# Patient Record
Sex: MALE | Race: WHITE | NOT HISPANIC OR LATINO | URBAN - METROPOLITAN AREA
[De-identification: names, ages, dates, MRNs, and addresses within clinical notes are randomized per-mention and may not be internally consistent; named-entity substitution may affect disease eponyms.]

---

## 2017-01-04 ENCOUNTER — OFFICE (AMBULATORY)
Dept: URBAN - METROPOLITAN AREA CLINIC 64 | Facility: CLINIC | Age: 52
End: 2017-01-04

## 2017-01-04 VITALS
SYSTOLIC BLOOD PRESSURE: 126 MMHG | WEIGHT: 153 LBS | HEIGHT: 68 IN | HEART RATE: 88 BPM | DIASTOLIC BLOOD PRESSURE: 68 MMHG

## 2017-01-04 DIAGNOSIS — K22.70 BARRETT'S ESOPHAGUS WITHOUT DYSPLASIA: ICD-10-CM

## 2017-01-04 DIAGNOSIS — K62.6 ULCER OF ANUS AND RECTUM: ICD-10-CM

## 2017-01-04 PROCEDURE — 99212 OFFICE O/P EST SF 10 MIN: CPT | Performed by: INTERNAL MEDICINE

## 2017-09-14 ENCOUNTER — HOSPITAL ENCOUNTER (OUTPATIENT)
Dept: LAB | Facility: HOSPITAL | Age: 52
Discharge: HOME | End: 2017-09-14
Attending: PSYCHIATRY & NEUROLOGY | Admitting: PSYCHIATRY & NEUROLOGY

## 2017-10-25 ENCOUNTER — HOSPITAL ENCOUNTER (OUTPATIENT)
Dept: URGENT CARE | Facility: CLINIC | Age: 52
Discharge: HOME | End: 2017-10-25
Attending: NURSE PRACTITIONER | Admitting: NURSE PRACTITIONER

## 2017-12-11 ENCOUNTER — HOSPITAL ENCOUNTER (OUTPATIENT)
Dept: PHYSICAL THERAPY | Facility: HOSPITAL | Age: 52
Setting detail: RECURRING SERIES
Discharge: HOME | End: 2018-02-28
Attending: ORTHOPAEDIC SURGERY | Admitting: ORTHOPAEDIC SURGERY

## 2017-12-21 ENCOUNTER — HOSPITAL ENCOUNTER (OUTPATIENT)
Dept: ULTRASOUND IMAGING | Facility: HOSPITAL | Age: 52
Discharge: HOME | End: 2017-12-21
Attending: NURSE PRACTITIONER | Admitting: NURSE PRACTITIONER

## 2018-02-28 ENCOUNTER — OFFICE (AMBULATORY)
Dept: URBAN - METROPOLITAN AREA CLINIC 64 | Facility: CLINIC | Age: 53
End: 2018-02-28

## 2018-02-28 VITALS
HEIGHT: 68 IN | HEART RATE: 74 BPM | DIASTOLIC BLOOD PRESSURE: 69 MMHG | SYSTOLIC BLOOD PRESSURE: 110 MMHG | WEIGHT: 143 LBS

## 2018-02-28 DIAGNOSIS — K22.70 BARRETT'S ESOPHAGUS WITHOUT DYSPLASIA: ICD-10-CM

## 2018-02-28 DIAGNOSIS — K62.6 ULCER OF ANUS AND RECTUM: ICD-10-CM

## 2018-02-28 DIAGNOSIS — K21.9 GASTRO-ESOPHAGEAL REFLUX DISEASE WITHOUT ESOPHAGITIS: ICD-10-CM

## 2018-02-28 PROCEDURE — 99212 OFFICE O/P EST SF 10 MIN: CPT | Performed by: INTERNAL MEDICINE

## 2018-03-19 ENCOUNTER — HOSPITAL ENCOUNTER (OUTPATIENT)
Dept: PREADMISSION TESTING | Facility: HOSPITAL | Age: 53
Discharge: HOME | End: 2018-03-19
Attending: SURGERY | Admitting: SURGERY

## 2018-03-29 ENCOUNTER — HOSPITAL ENCOUNTER (OUTPATIENT)
Dept: OTHER | Facility: HOSPITAL | Age: 53
Setting detail: SPECIMEN
Discharge: HOME | End: 2018-03-29
Attending: SURGERY | Admitting: SURGERY

## 2018-05-21 ENCOUNTER — HOSPITAL ENCOUNTER (OUTPATIENT)
Dept: LAB | Facility: HOSPITAL | Age: 53
Discharge: HOME | End: 2018-05-21
Attending: PSYCHIATRY & NEUROLOGY | Admitting: PSYCHIATRY & NEUROLOGY

## 2018-05-21 LAB
BASOPHILS # BLD AUTO: 0 10*3/UL (ref 0–0.2)
BASOPHILS NFR BLD AUTO: 0 % (ref 0–2)
CHOLEST SERPL-MCNC: 125 MG/DL
CHOLEST/HDLC SERPL: 3 {RATIO}
CONV LDL CHOLESTEROL DIRECT: 72 MG/DL (ref 0–100)
DIFFERENTIAL METHOD BLD: (no result)
EOSINOPHIL # BLD AUTO: 0.1 10*3/UL (ref 0–0.3)
EOSINOPHIL # BLD AUTO: 1 % (ref 0–3)
ERYTHROCYTE [DISTWIDTH] IN BLOOD BY AUTOMATED COUNT: 14.1 % (ref 11.5–14.5)
HCT VFR BLD AUTO: 37.8 % (ref 40–54)
HDLC SERPL-MCNC: 42 MG/DL
HGB BLD-MCNC: 12.5 G/DL (ref 14–18)
LDLC/HDLC SERPL: 1.7 {RATIO}
LIPID INTERPRETATION: NORMAL
LYMPHOCYTES # BLD AUTO: 1.6 10*3/UL (ref 0.8–4.8)
LYMPHOCYTES NFR BLD AUTO: 24 % (ref 18–42)
MCH RBC QN AUTO: 31.7 PG (ref 26–32)
MCHC RBC AUTO-ENTMCNC: 32.9 G/DL (ref 32–36)
MCV RBC AUTO: 96.2 FL (ref 80–94)
MONOCYTES # BLD AUTO: 0.5 10*3/UL (ref 0.1–1.3)
MONOCYTES NFR BLD AUTO: 7 % (ref 2–11)
NEUTROPHILS # BLD AUTO: 4.5 10*3/UL (ref 2.3–8.6)
NEUTROPHILS NFR BLD AUTO: 68 % (ref 50–75)
NRBC BLD AUTO-RTO: 0 /100{WBCS}
NRBC/RBC NFR BLD MANUAL: 0 10*3/UL
PLATELET # BLD AUTO: 198 10*3/UL (ref 150–450)
PMV BLD AUTO: 9 FL (ref 7.4–10.4)
RBC # BLD AUTO: 3.93 10*6/UL (ref 4.6–6)
TRIGL SERPL-MCNC: 32 MG/DL
VLDLC SERPL CALC-MCNC: 10.5 MG/DL
WBC # BLD AUTO: 6.7 10*3/UL (ref 4.5–11.5)

## 2018-10-05 ENCOUNTER — HOSPITAL ENCOUNTER (OUTPATIENT)
Dept: INFUSION THERAPY | Facility: HOSPITAL | Age: 53
Setting detail: RECURRING SERIES
Discharge: HOME | End: 2018-10-18
Attending: UROLOGY | Admitting: UROLOGY

## 2019-03-08 ENCOUNTER — OFFICE (AMBULATORY)
Dept: URBAN - METROPOLITAN AREA CLINIC 64 | Facility: CLINIC | Age: 54
End: 2019-03-08

## 2019-03-08 VITALS
HEART RATE: 78 BPM | WEIGHT: 151 LBS | SYSTOLIC BLOOD PRESSURE: 116 MMHG | HEIGHT: 68 IN | DIASTOLIC BLOOD PRESSURE: 74 MMHG

## 2019-03-08 DIAGNOSIS — K22.70 BARRETT'S ESOPHAGUS WITHOUT DYSPLASIA: ICD-10-CM

## 2019-03-08 DIAGNOSIS — K21.9 GASTRO-ESOPHAGEAL REFLUX DISEASE WITHOUT ESOPHAGITIS: ICD-10-CM

## 2019-03-08 DIAGNOSIS — K62.6 ULCER OF ANUS AND RECTUM: ICD-10-CM

## 2019-03-08 PROCEDURE — 99213 OFFICE O/P EST LOW 20 MIN: CPT | Performed by: INTERNAL MEDICINE

## 2019-04-11 ENCOUNTER — HOSPITAL ENCOUNTER (OUTPATIENT)
Dept: OTHER | Facility: HOSPITAL | Age: 54
Setting detail: SPECIMEN
Discharge: HOME | End: 2019-04-11
Attending: INTERNAL MEDICINE | Admitting: INTERNAL MEDICINE

## 2019-04-11 ENCOUNTER — ON CAMPUS - OUTPATIENT (AMBULATORY)
Dept: URBAN - METROPOLITAN AREA HOSPITAL 2 | Facility: HOSPITAL | Age: 54
End: 2019-04-11

## 2019-04-11 ENCOUNTER — OFFICE (AMBULATORY)
Dept: URBAN - METROPOLITAN AREA PATHOLOGY 4 | Facility: PATHOLOGY | Age: 54
End: 2019-04-11

## 2019-04-11 VITALS
DIASTOLIC BLOOD PRESSURE: 99 MMHG | RESPIRATION RATE: 18 BRPM | HEART RATE: 79 BPM | OXYGEN SATURATION: 93 % | SYSTOLIC BLOOD PRESSURE: 97 MMHG | SYSTOLIC BLOOD PRESSURE: 156 MMHG | OXYGEN SATURATION: 97 % | HEART RATE: 82 BPM | SYSTOLIC BLOOD PRESSURE: 114 MMHG | RESPIRATION RATE: 16 BRPM | HEART RATE: 88 BPM | DIASTOLIC BLOOD PRESSURE: 60 MMHG | HEART RATE: 91 BPM | HEART RATE: 95 BPM | TEMPERATURE: 97 F | OXYGEN SATURATION: 98 % | WEIGHT: 154 LBS | SYSTOLIC BLOOD PRESSURE: 123 MMHG | RESPIRATION RATE: 17 BRPM | OXYGEN SATURATION: 95 % | OXYGEN SATURATION: 96 % | HEIGHT: 68 IN | HEART RATE: 90 BPM | DIASTOLIC BLOOD PRESSURE: 54 MMHG | DIASTOLIC BLOOD PRESSURE: 64 MMHG | SYSTOLIC BLOOD PRESSURE: 139 MMHG | DIASTOLIC BLOOD PRESSURE: 88 MMHG | RESPIRATION RATE: 14 BRPM

## 2019-04-11 DIAGNOSIS — K22.70 BARRETT'S ESOPHAGUS WITHOUT DYSPLASIA: ICD-10-CM

## 2019-04-11 LAB
GI HISTOLOGY: A. SELECT: (no result)
GI HISTOLOGY: B. SELECT: (no result)
GI HISTOLOGY: PDF REPORT: (no result)

## 2019-04-11 PROCEDURE — 43239 EGD BIOPSY SINGLE/MULTIPLE: CPT | Performed by: INTERNAL MEDICINE

## 2019-04-11 PROCEDURE — 88305 TISSUE EXAM BY PATHOLOGIST: CPT | Mod: 26 | Performed by: INTERNAL MEDICINE

## 2020-07-08 ENCOUNTER — OFFICE (AMBULATORY)
Dept: URBAN - METROPOLITAN AREA CLINIC 64 | Facility: CLINIC | Age: 55
End: 2020-07-08

## 2020-07-08 VITALS
SYSTOLIC BLOOD PRESSURE: 86 MMHG | HEART RATE: 109 BPM | WEIGHT: 149 LBS | HEIGHT: 68 IN | DIASTOLIC BLOOD PRESSURE: 43 MMHG

## 2020-07-08 DIAGNOSIS — D50.9 IRON DEFICIENCY ANEMIA, UNSPECIFIED: ICD-10-CM

## 2020-07-08 DIAGNOSIS — K22.70 BARRETT'S ESOPHAGUS WITHOUT DYSPLASIA: ICD-10-CM

## 2020-07-08 DIAGNOSIS — K62.6 ULCER OF ANUS AND RECTUM: ICD-10-CM

## 2020-07-08 PROCEDURE — 99214 OFFICE O/P EST MOD 30 MIN: CPT | Performed by: INTERNAL MEDICINE

## 2020-10-19 ENCOUNTER — TRANSCRIBE ORDERS (OUTPATIENT)
Dept: ADMINISTRATIVE | Facility: HOSPITAL | Age: 55
End: 2020-10-19

## 2020-10-19 DIAGNOSIS — N32.81 OVERACTIVE BLADDER: ICD-10-CM

## 2020-10-19 DIAGNOSIS — N40.0 ENLARGED PROSTATE: Primary | ICD-10-CM

## 2020-10-28 ENCOUNTER — HOSPITAL ENCOUNTER (OUTPATIENT)
Dept: ULTRASOUND IMAGING | Facility: HOSPITAL | Age: 55
Discharge: HOME | End: 2020-10-28
Admitting: NURSE PRACTITIONER

## 2020-10-28 DIAGNOSIS — N32.81 OVERACTIVE BLADDER: ICD-10-CM

## 2020-10-28 DIAGNOSIS — N40.0 ENLARGED PROSTATE: ICD-10-CM

## 2020-10-28 PROCEDURE — 76775 US EXAM ABDO BACK WALL LIM: CPT

## 2021-04-14 ENCOUNTER — TRANSCRIBE ORDERS (OUTPATIENT)
Dept: ULTRASOUND IMAGING | Facility: HOSPITAL | Age: 56
End: 2021-04-14

## 2021-04-14 DIAGNOSIS — N32.3 DIVERTICULUM OF BLADDER: Primary | ICD-10-CM

## 2021-05-04 ENCOUNTER — HOSPITAL ENCOUNTER (OUTPATIENT)
Dept: ULTRASOUND IMAGING | Facility: HOSPITAL | Age: 56
Discharge: HOME | End: 2021-05-04
Admitting: UROLOGY

## 2021-05-04 DIAGNOSIS — N32.3 DIVERTICULUM OF BLADDER: ICD-10-CM

## 2021-05-04 PROCEDURE — 76775 US EXAM ABDO BACK WALL LIM: CPT

## 2021-06-10 ENCOUNTER — LAB (OUTPATIENT)
Dept: LAB | Facility: HOSPITAL | Age: 56
End: 2021-06-10

## 2021-06-10 ENCOUNTER — TRANSCRIBE ORDERS (OUTPATIENT)
Dept: ADMINISTRATIVE | Facility: HOSPITAL | Age: 56
End: 2021-06-10

## 2021-06-10 DIAGNOSIS — N13.8 ENLARGED PROSTATE WITH URINARY OBSTRUCTION: ICD-10-CM

## 2021-06-10 DIAGNOSIS — R35.0 URINARY FREQUENCY: Primary | ICD-10-CM

## 2021-06-10 DIAGNOSIS — N40.1 ENLARGED PROSTATE WITH URINARY OBSTRUCTION: ICD-10-CM

## 2021-06-10 DIAGNOSIS — R35.0 URINARY FREQUENCY: ICD-10-CM

## 2021-06-10 LAB — PSA SERPL-MCNC: 0.05 NG/ML (ref 0–4)

## 2021-06-10 PROCEDURE — 84153 ASSAY OF PSA TOTAL: CPT

## 2021-06-10 PROCEDURE — G0103 PSA SCREENING: HCPCS

## 2021-06-10 PROCEDURE — 36415 COLL VENOUS BLD VENIPUNCTURE: CPT

## 2021-06-15 ENCOUNTER — TRANSCRIBE ORDERS (OUTPATIENT)
Dept: ADMINISTRATIVE | Facility: HOSPITAL | Age: 56
End: 2021-06-15

## 2021-06-15 DIAGNOSIS — R35.0 URINARY FREQUENCY: Primary | ICD-10-CM

## 2021-06-15 DIAGNOSIS — N28.1 RENAL CYST: ICD-10-CM

## 2021-06-30 ENCOUNTER — HOSPITAL ENCOUNTER (OUTPATIENT)
Dept: MRI IMAGING | Facility: HOSPITAL | Age: 56
Discharge: HOME | End: 2021-06-30

## 2021-06-30 ENCOUNTER — HOSPITAL ENCOUNTER (OUTPATIENT)
Dept: GENERAL RADIOLOGY | Facility: HOSPITAL | Age: 56
Discharge: HOME | End: 2021-06-30

## 2021-06-30 DIAGNOSIS — N28.1 RENAL CYST: ICD-10-CM

## 2021-06-30 DIAGNOSIS — M79.5 FOREIGN BODY (FB) IN SOFT TISSUE: ICD-10-CM

## 2021-06-30 DIAGNOSIS — R35.0 URINARY FREQUENCY: ICD-10-CM

## 2021-06-30 LAB — CREAT BLDA-MCNC: 1 MG/DL (ref 0.6–1.3)

## 2021-06-30 PROCEDURE — A9579 GAD-BASE MR CONTRAST NOS,1ML: HCPCS | Performed by: NURSE PRACTITIONER

## 2021-06-30 PROCEDURE — 25010000002 GADOTERIDOL PER 1 ML: Performed by: NURSE PRACTITIONER

## 2021-06-30 PROCEDURE — 82565 ASSAY OF CREATININE: CPT

## 2021-06-30 PROCEDURE — 70250 X-RAY EXAM OF SKULL: CPT

## 2021-06-30 PROCEDURE — 74183 MRI ABD W/O CNTR FLWD CNTR: CPT

## 2021-06-30 PROCEDURE — 74018 RADEX ABDOMEN 1 VIEW: CPT

## 2021-06-30 PROCEDURE — 71045 X-RAY EXAM CHEST 1 VIEW: CPT

## 2021-06-30 RX ADMIN — GADOTERIDOL 20 ML: 279.3 INJECTION, SOLUTION INTRAVENOUS at 12:59

## 2021-07-22 ENCOUNTER — OFFICE (AMBULATORY)
Dept: URBAN - METROPOLITAN AREA CLINIC 64 | Facility: CLINIC | Age: 56
End: 2021-07-22

## 2021-07-22 VITALS
DIASTOLIC BLOOD PRESSURE: 74 MMHG | HEIGHT: 68 IN | SYSTOLIC BLOOD PRESSURE: 134 MMHG | HEART RATE: 85 BPM | WEIGHT: 153 LBS

## 2021-07-22 DIAGNOSIS — K62.6 ULCER OF ANUS AND RECTUM: ICD-10-CM

## 2021-07-22 DIAGNOSIS — K22.70 BARRETT'S ESOPHAGUS WITHOUT DYSPLASIA: ICD-10-CM

## 2021-07-22 PROCEDURE — 99214 OFFICE O/P EST MOD 30 MIN: CPT | Performed by: INTERNAL MEDICINE

## 2022-01-18 ENCOUNTER — LAB (OUTPATIENT)
Dept: LAB | Facility: HOSPITAL | Age: 57
End: 2022-01-18

## 2022-01-18 ENCOUNTER — TRANSCRIBE ORDERS (OUTPATIENT)
Dept: ADMINISTRATIVE | Facility: HOSPITAL | Age: 57
End: 2022-01-18

## 2022-01-18 DIAGNOSIS — Z11.52 ENCOUNTER FOR SCREENING FOR SEVERE ACUTE RESPIRATORY SYNDROME CORONAVIRUS 2 (SARS-COV-2) INFECTION: ICD-10-CM

## 2022-01-18 DIAGNOSIS — Z11.52 ENCOUNTER FOR SCREENING FOR SEVERE ACUTE RESPIRATORY SYNDROME CORONAVIRUS 2 (SARS-COV-2) INFECTION: Primary | ICD-10-CM

## 2022-01-18 LAB — SARS-COV-2 ORF1AB RESP QL NAA+PROBE: DETECTED

## 2022-01-18 PROCEDURE — U0004 COV-19 TEST NON-CDC HGH THRU: HCPCS

## 2022-01-18 PROCEDURE — U0005 INFEC AGEN DETEC AMPLI PROBE: HCPCS

## 2022-01-18 PROCEDURE — C9803 HOPD COVID-19 SPEC COLLECT: HCPCS

## 2022-04-20 ENCOUNTER — OFFICE (AMBULATORY)
Dept: URBAN - METROPOLITAN AREA PATHOLOGY 4 | Facility: PATHOLOGY | Age: 57
End: 2022-04-20

## 2022-04-20 ENCOUNTER — ON CAMPUS - OUTPATIENT (AMBULATORY)
Dept: URBAN - METROPOLITAN AREA HOSPITAL 2 | Facility: HOSPITAL | Age: 57
End: 2022-04-20

## 2022-04-20 ENCOUNTER — OFFICE (AMBULATORY)
Dept: URBAN - METROPOLITAN AREA PATHOLOGY 4 | Facility: PATHOLOGY | Age: 57
End: 2022-04-20
Payer: COMMERCIAL

## 2022-04-20 VITALS
TEMPERATURE: 97.9 F | HEART RATE: 89 BPM | DIASTOLIC BLOOD PRESSURE: 38 MMHG | DIASTOLIC BLOOD PRESSURE: 76 MMHG | SYSTOLIC BLOOD PRESSURE: 111 MMHG | HEART RATE: 76 BPM | DIASTOLIC BLOOD PRESSURE: 49 MMHG | HEART RATE: 65 BPM | RESPIRATION RATE: 17 BRPM | WEIGHT: 151 LBS | DIASTOLIC BLOOD PRESSURE: 52 MMHG | DIASTOLIC BLOOD PRESSURE: 69 MMHG | SYSTOLIC BLOOD PRESSURE: 119 MMHG | OXYGEN SATURATION: 97 % | DIASTOLIC BLOOD PRESSURE: 58 MMHG | RESPIRATION RATE: 18 BRPM | OXYGEN SATURATION: 96 % | OXYGEN SATURATION: 98 % | HEART RATE: 75 BPM | OXYGEN SATURATION: 94 % | HEART RATE: 81 BPM | HEIGHT: 68 IN | HEART RATE: 80 BPM | SYSTOLIC BLOOD PRESSURE: 112 MMHG | RESPIRATION RATE: 19 BRPM | SYSTOLIC BLOOD PRESSURE: 97 MMHG | OXYGEN SATURATION: 99 % | HEART RATE: 68 BPM | SYSTOLIC BLOOD PRESSURE: 116 MMHG | SYSTOLIC BLOOD PRESSURE: 126 MMHG | SYSTOLIC BLOOD PRESSURE: 93 MMHG | DIASTOLIC BLOOD PRESSURE: 53 MMHG | HEART RATE: 66 BPM | OXYGEN SATURATION: 95 % | DIASTOLIC BLOOD PRESSURE: 48 MMHG | SYSTOLIC BLOOD PRESSURE: 87 MMHG | SYSTOLIC BLOOD PRESSURE: 79 MMHG | HEART RATE: 71 BPM | RESPIRATION RATE: 16 BRPM

## 2022-04-20 DIAGNOSIS — K64.1 SECOND DEGREE HEMORRHOIDS: ICD-10-CM

## 2022-04-20 DIAGNOSIS — K57.30 DIVERTICULOSIS OF LARGE INTESTINE WITHOUT PERFORATION OR ABS: ICD-10-CM

## 2022-04-20 DIAGNOSIS — K22.70 BARRETT'S ESOPHAGUS WITHOUT DYSPLASIA: ICD-10-CM

## 2022-04-20 DIAGNOSIS — K62.89 OTHER SPECIFIED DISEASES OF ANUS AND RECTUM: ICD-10-CM

## 2022-04-20 DIAGNOSIS — K63.3 ULCER OF INTESTINE: ICD-10-CM

## 2022-04-20 DIAGNOSIS — R19.4 CHANGE IN BOWEL HABIT: ICD-10-CM

## 2022-04-20 DIAGNOSIS — K44.9 DIAPHRAGMATIC HERNIA WITHOUT OBSTRUCTION OR GANGRENE: ICD-10-CM

## 2022-04-20 LAB
GI HISTOLOGY: A. SELECT: (no result)
GI HISTOLOGY: B. UNSPECIFIED: (no result)
GI HISTOLOGY: PDF REPORT: (no result)

## 2022-04-20 PROCEDURE — 88305 TISSUE EXAM BY PATHOLOGIST: CPT | Mod: 26 | Performed by: INTERNAL MEDICINE

## 2022-04-20 PROCEDURE — 45380 COLONOSCOPY AND BIOPSY: CPT | Performed by: INTERNAL MEDICINE

## 2022-04-20 PROCEDURE — 43239 EGD BIOPSY SINGLE/MULTIPLE: CPT | Performed by: INTERNAL MEDICINE

## 2022-04-20 RX ORDER — MESALAMINE 1000 MG/1
1 SUPPOSITORY RECTAL
Qty: 30 | Refills: 6 | Status: ACTIVE
Start: 2022-04-20

## 2022-06-03 ENCOUNTER — HOSPITAL ENCOUNTER (EMERGENCY)
Facility: HOSPITAL | Age: 57
Discharge: HOME | End: 2022-06-03
Attending: EMERGENCY MEDICINE | Admitting: EMERGENCY MEDICINE

## 2022-06-03 ENCOUNTER — APPOINTMENT (OUTPATIENT)
Dept: GENERAL RADIOLOGY | Facility: HOSPITAL | Age: 57
End: 2022-06-03

## 2022-06-03 VITALS
RESPIRATION RATE: 16 BRPM | WEIGHT: 170 LBS | OXYGEN SATURATION: 94 % | SYSTOLIC BLOOD PRESSURE: 140 MMHG | DIASTOLIC BLOOD PRESSURE: 75 MMHG | TEMPERATURE: 97.9 F | BODY MASS INDEX: 27.32 KG/M2 | HEIGHT: 66 IN | HEART RATE: 96 BPM

## 2022-06-03 DIAGNOSIS — S82.032A CLOSED DISPLACED TRANSVERSE FRACTURE OF LEFT PATELLA, INITIAL ENCOUNTER: Primary | ICD-10-CM
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
## 2022-06-03 PROCEDURE — 99284 EMERGENCY DEPT VISIT MOD MDM: CPT

## 2022-06-03 PROCEDURE — 73562 X-RAY EXAM OF KNEE 3: CPT

## 2022-06-03 PROCEDURE — 99283 EMERGENCY DEPT VISIT LOW MDM: CPT

## 2022-06-03 RX ORDER — HYDROCODONE BITARTRATE AND ACETAMINOPHEN 5; 325 MG/1; MG/1
1 TABLET ORAL ONCE AS NEEDED
Status: COMPLETED | OUTPATIENT
Start: 2022-06-03 | End: 2022-06-03

## 2022-06-03 RX ORDER — ONDANSETRON 4 MG/1
4 TABLET, ORALLY DISINTEGRATING ORAL EVERY 8 HOURS PRN
Qty: 10 TABLET | Refills: 0 | Status: SHIPPED | OUTPATIENT
Start: 2022-06-03 | End: 2022-08-12

## 2022-06-03 RX ORDER — HYDROCODONE BITARTRATE AND ACETAMINOPHEN 5; 325 MG/1; MG/1
1 TABLET ORAL EVERY 6 HOURS PRN
Qty: 12 TABLET | Refills: 0 | Status: ON HOLD | OUTPATIENT
Start: 2022-06-03 | End: 2022-08-23 | Stop reason: SDUPTHER

## 2022-06-03 RX ADMIN — HYDROCODONE BITARTRATE AND ACETAMINOPHEN 1 TABLET: 5; 325 TABLET ORAL at 16:07

## 2022-06-03 NOTE — DISCHARGE INSTRUCTIONS
Use knee immobilizer and walker, nonweightbearing  Call Dr. Figueroa, orthopedist first thing the morning  Prescriptions for Norco and Zofran have been sent to preferred pharmacy  Take Norco as prescribed  Can also use ibuprofen, can take Tylenol but be aware of how much Tylenol you are taking throughout the day with Norco  Ice to the area of discomfort 20 minutes at a time 3-4 times a day    Return to the ER for new or worsening symptoms

## 2022-06-03 NOTE — ED PROVIDER NOTES
Subjective   Chief Complaint: Fall      HPI: Patient is a 57-year-old male presents to the ER today by EMS following a fall.  Patient has a history of mental delay and has a caretaker at bedside who states that patient was attempting to step up on a curb and tripped striking his left knee.  He was nonambulatory following the fall as caretaker told him to stay on the ground due to left knee swelling and pain.  Patient has no numbness and tingling to lower extremity, pain with movement and palpation.    Caretaker at bedside reports the patient has a mental delay    PCP: Mally          Review of Systems   Constitutional: Negative.    HENT: Negative.    Respiratory: Negative.    Cardiovascular: Negative.    Gastrointestinal: Negative.    Genitourinary: Negative.    Musculoskeletal: Positive for arthralgias and joint swelling.   Neurological: Negative.    Hematological: Negative.    Psychiatric/Behavioral: Negative.        Past Medical History:   Diagnosis Date   • Anemia    • Anxiety    • Cataract     bilateral   • Colitis    • Depression    • Disease of thyroid gland    • Diverticulitis    • Hemorrhoids    • History of BPH    • Hydrocele in adult    • Hyperopia    • Hypertension    • Impulse control disorder    • Seasonal allergies    • Shingles        No Known Allergies    Past Surgical History:   Procedure Laterality Date   • BACK SURGERY      nodule removed   • ORCHIECTOMY Left    • ORIF HIP FRACTURE Right        History reviewed. No pertinent family history.    Social History     Socioeconomic History   • Marital status: Single   Tobacco Use   • Smoking status: Never Smoker   Vaping Use   • Vaping Use: Never used   Substance and Sexual Activity   • Alcohol use: Never   • Drug use: Never   • Sexual activity: Defer           Objective   Physical Exam  Vitals reviewed.   Constitutional:       Appearance: He is not toxic-appearing.   HENT:      Head: Normocephalic.      Right Ear: Tympanic membrane normal.      Left  "Ear: Tympanic membrane normal.      Nose: Nose normal.      Mouth/Throat:      Mouth: Mucous membranes are moist.      Pharynx: Oropharynx is clear.   Eyes:      Extraocular Movements: Extraocular movements intact.      Pupils: Pupils are equal, round, and reactive to light.   Cardiovascular:      Rate and Rhythm: Normal rate.      Pulses: Normal pulses.      Heart sounds: Normal heart sounds. No murmur heard.  Pulmonary:      Effort: Pulmonary effort is normal.      Breath sounds: Normal breath sounds. No wheezing.   Abdominal:      Palpations: Abdomen is soft.   Musculoskeletal:         General: Tenderness present.      Cervical back: Normal range of motion. No tenderness.      Right knee: Normal.      Left knee: Swelling and deformity present. Decreased range of motion. Tenderness present.   Skin:     General: Skin is warm and dry.      Capillary Refill: Capillary refill takes less than 2 seconds.      Findings: No bruising.   Neurological:      Mental Status: He is alert. Mental status is at baseline.         Procedures           ED Course  ED Course as of 06/03/22 1728   Fri Jun 03, 2022   1553 Inspect completed patient has had 2 prescribers with a total of 25 prescriptions with the most recent 5/12/2022 for clonazepam for 28-day supply [BH]   1615 Patient was unable to ambulate using assistive devices, walker or crutches.  Patient will be admitted or further evaluation and plan of care. []   1716 Spoke with Radha head nurse at patient's waiver home who states that they will have appropriate staffing and will be able to care for the patient if he is discharged. []   1716   Discharge []      ED Course User Index  [] Lindsay Felix APRN           /75 (BP Location: Left arm, Patient Position: Sitting)   Pulse 96   Temp 97.9 °F (36.6 °C) (Oral)   Resp 16   Ht 167.6 cm (66\")   Wt 77.1 kg (170 lb)   SpO2 94%   BMI 27.44 kg/m²   Labs Reviewed - No data to display  Medications "   HYDROcodone-acetaminophen (NORCO) 5-325 MG per tablet 1 tablet (1 tablet Oral Given 6/3/22 3434)     XR Knee 3 View Left    Result Date: 6/3/2022  1.Transverse fracture involving the mid pole of the patella. There is distraction of the proximal distal fracture fragments by approximately 2.5 cm. 2.There is also lateral displacement of the patellar fracture fragments suggesting potential medial ligament disruption. 3.There is marked overlying anterior soft tissue prominence suggesting edema and hemorrhage.  Electronically Signed By-Jesu Arias MD On:6/3/2022 3:40 PM This report was finalized on [id number] by  Jesu Arias MD.                                          MDM  Number of Diagnoses or Management Options  Closed displaced transverse fracture of left patella, initial encounter  Fall, initial encounter  Diagnosis management comments: While in the emergency room above evaluation was completed and imaging was obtained, noted transverse patellar fracture.  Call was placed to Dr. Figueroa with orthopedist who advised placement of a knee immobilizer and patient to utilize crutches or walker.  Patient does have a mental delay and a walker was utilized.  There was difficulty educating the patient on the use of the walker and there was concern that patient may need admission for continued observation and placement.  I spoke with Radha who is the primary nurse who cares for the patient as he currently lives in a waiver home.  She states that they have a wheelchair and patient will be provided with a walker, she states that she has appropriate staff and is able to care for this patient as he will need.  Caretaker at the bedside was updated on plan for discharge.  Patient was at baseline, he was given a dose of Norco in the emergency room and pain and antiemetics were sent to his preferred pharmacy.  There were no further questions at time of discharge.     Chart review: No recent visits that are pertinent to  today's complaints    Comorbidities:  Depression, impulse control disorder, history of BPH, colitis, hyperopia, disease of thyroid gland, shingles, anxiety, anemia, cataracts, diverticulitis, Hydrozone ago, hypertension, seasonal allergies      Differentials: Contusion, fracture not all inclusive of differentials considered      Pt is aware that discharge does not mean that nothing is wrong but it indicates no emergency is present and they must continue care with follow-up as given below or physician of their choice    Appropriate PPE worn throughout the care of this patient.           Amount and/or Complexity of Data Reviewed  Tests in the radiology section of CPT®: reviewed    Patient Progress  Patient progress: improved      Final diagnoses:   Closed displaced transverse fracture of left patella, initial encounter   Fall, initial encounter       ED Disposition  ED Disposition     ED Disposition   Discharge    Condition   Stable    Comment   --             Maki Bal, APRN  [address] [facility] [place] [address] [phone]    Schedule an appointment as soon as possible for a visit in 1 week  As needed    Juan R Figueroa II, MD  [address] [place] [address] [place] [address] [phone]    Call in 1 day           Medication List      New Prescriptions    HYDROcodone-acetaminophen 5-325 MG per tablet  Commonly known as: NORCO  Take 1 tablet by mouth Every 6 (Six) Hours As Needed for Moderate Pain .     ondansetron ODT 4 MG disintegrating tablet  Commonly known as: ZOFRAN-ODT  Place 1 tablet on the tongue Every 8 (Eight) Hours As Needed for Nausea or Vomiting.           Where to Get Your Medications      These medications were sent to [facility] [address] [place] [address]    Hours: Mon-Fri 7:00AM-7:00PM Phone: [phone]   · HYDROcodone-acetaminophen 5-325 MG per tablet  · ondansetron ODT 4 MG disintegrating tablet          Lindsay Felix,  APRN  06/03/22 1729

## 2022-06-08 ENCOUNTER — LAB (OUTPATIENT)
Dept: LAB | Facility: HOSPITAL | Age: 57
End: 2022-06-08

## 2022-06-08 ENCOUNTER — HOSPITAL ENCOUNTER (OUTPATIENT)
Dept: CARDIOLOGY | Facility: HOSPITAL | Age: 57
Discharge: HOME | End: 2022-06-08

## 2022-06-08 ENCOUNTER — TRANSCRIBE ORDERS (OUTPATIENT)
Dept: ADMINISTRATIVE | Facility: HOSPITAL | Age: 57
End: 2022-06-08

## 2022-06-08 DIAGNOSIS — Z01.89 SPECIAL NEEDS ASSESSMENT: ICD-10-CM

## 2022-06-08 DIAGNOSIS — Z01.818 PREOP EXAMINATION: ICD-10-CM

## 2022-06-08 DIAGNOSIS — Z01.89 SPECIAL NEEDS ASSESSMENT: Primary | ICD-10-CM

## 2022-06-08 PROCEDURE — 93010 ELECTROCARDIOGRAM REPORT: CPT | Performed by: INTERNAL MEDICINE

## 2022-06-08 PROCEDURE — 85025 COMPLETE CBC W/AUTO DIFF WBC: CPT

## 2022-06-08 PROCEDURE — 36415 COLL VENOUS BLD VENIPUNCTURE: CPT

## 2022-06-08 PROCEDURE — 80053 COMPREHEN METABOLIC PANEL: CPT

## 2022-06-08 PROCEDURE — 93005 ELECTROCARDIOGRAM TRACING: CPT | Performed by: ORTHOPAEDIC SURGERY

## 2022-06-09 LAB
ALBUMIN SERPL-MCNC: 3.8 G/DL (ref 3.5–5.2)
ALBUMIN/GLOB SERPL: 1.2 G/DL
ALP SERPL-CCNC: 80 U/L (ref 39–117)
ALT SERPL W P-5'-P-CCNC: 23 U/L (ref 1–41)
ANION GAP SERPL CALCULATED.3IONS-SCNC: 13.5 MMOL/L (ref 5–15)
AST SERPL-CCNC: 30 U/L (ref 1–40)
BASOPHILS # BLD AUTO: 0.03 10*3/MM3 (ref 0–0.2)
BASOPHILS NFR BLD AUTO: 0.4 % (ref 0–1.5)
BILIRUB SERPL-MCNC: 0.3 MG/DL (ref 0–1.2)
BUN SERPL-MCNC: 22 MG/DL (ref 6–20)
BUN/CREAT SERPL: 27.5 (ref 7–25)
CALCIUM SPEC-SCNC: 9 MG/DL (ref 8.6–10.5)
CHLORIDE SERPL-SCNC: 101 MMOL/L (ref 98–107)
CO2 SERPL-SCNC: 26.5 MMOL/L (ref 22–29)
CREAT SERPL-MCNC: 0.8 MG/DL (ref 0.76–1.27)
DEPRECATED RDW RBC AUTO: 43.1 FL (ref 37–54)
EGFRCR SERPLBLD CKD-EPI 2021: 103.2 ML/MIN/1.73
EOSINOPHIL # BLD AUTO: 0.09 10*3/MM3 (ref 0–0.4)
EOSINOPHIL NFR BLD AUTO: 1.2 % (ref 0.3–6.2)
ERYTHROCYTE [DISTWIDTH] IN BLOOD BY AUTOMATED COUNT: 12.1 % (ref 12.3–15.4)
GLOBULIN UR ELPH-MCNC: 3.1 GM/DL
GLUCOSE SERPL-MCNC: 108 MG/DL (ref 65–99)
HCT VFR BLD AUTO: 34.6 % (ref 37.5–51)
HGB BLD-MCNC: 11.3 G/DL (ref 13–17.7)
IMM GRANULOCYTES # BLD AUTO: 0.02 10*3/MM3 (ref 0–0.05)
IMM GRANULOCYTES NFR BLD AUTO: 0.3 % (ref 0–0.5)
LYMPHOCYTES # BLD AUTO: 1.19 10*3/MM3 (ref 0.7–3.1)
LYMPHOCYTES NFR BLD AUTO: 16 % (ref 19.6–45.3)
MCH RBC QN AUTO: 32 PG (ref 26.6–33)
MCHC RBC AUTO-ENTMCNC: 32.7 G/DL (ref 31.5–35.7)
MCV RBC AUTO: 98 FL (ref 79–97)
MONOCYTES # BLD AUTO: 0.79 10*3/MM3 (ref 0.1–0.9)
MONOCYTES NFR BLD AUTO: 10.6 % (ref 5–12)
NEUTROPHILS NFR BLD AUTO: 5.31 10*3/MM3 (ref 1.7–7)
NEUTROPHILS NFR BLD AUTO: 71.5 % (ref 42.7–76)
NRBC BLD AUTO-RTO: 0 /100 WBC (ref 0–0.2)
PLATELET # BLD AUTO: 216 10*3/MM3 (ref 140–450)
PMV BLD AUTO: 10.7 FL (ref 6–12)
POTASSIUM SERPL-SCNC: 3.7 MMOL/L (ref 3.5–5.2)
PROT SERPL-MCNC: 6.9 G/DL (ref 6–8.5)
RBC # BLD AUTO: 3.53 10*6/MM3 (ref 4.14–5.8)
SODIUM SERPL-SCNC: 141 MMOL/L (ref 136–145)
WBC NRBC COR # BLD: 7.43 10*3/MM3 (ref 3.4–10.8)

## 2022-06-13 ENCOUNTER — HOME CARE VISIT (OUTPATIENT)
Dept: HOME HEALTH SERVICES | Facility: HOME HEALTHCARE | Age: 57
End: 2022-06-13

## 2022-06-13 ENCOUNTER — HOME HEALTH ADMISSION (OUTPATIENT)
Dept: HOME HEALTH SERVICES | Facility: HOME HEALTHCARE | Age: 57
End: 2022-06-13

## 2022-06-13 ENCOUNTER — TRANSCRIBE ORDERS (OUTPATIENT)
Dept: HOME HEALTH SERVICES | Facility: HOME HEALTHCARE | Age: 57
End: 2022-06-13

## 2022-06-13 DIAGNOSIS — S82.002E TYPE I OR II OPEN FRACTURE OF LEFT PATELLA WITH ROUTINE HEALING, UNSPECIFIED FRACTURE MORPHOLOGY, SUBSEQUENT ENCOUNTER: Primary | ICD-10-CM

## 2022-06-13 LAB — QT INTERVAL: 375 MS

## 2022-06-13 PROCEDURE — G0151 HHCP-SERV OF PT,EA 15 MIN: HCPCS

## 2022-06-14 VITALS
WEIGHT: 165 LBS | RESPIRATION RATE: 18 BRPM | TEMPERATURE: 98.6 F | HEIGHT: 67 IN | OXYGEN SATURATION: 96 % | SYSTOLIC BLOOD PRESSURE: 124 MMHG | DIASTOLIC BLOOD PRESSURE: 72 MMHG | HEART RATE: 88 BPM | BODY MASS INDEX: 25.9 KG/M2

## 2022-06-14 NOTE — HOME HEALTH
"Mr. Lucien Morrison is 57-year old patient with history of mental retardation and lives in a group home apt with 24/7 paid caregiver assistance. He was referred to this Agency with closed displaced transverse fracture of lelft patella following a fall incident. Patient is currently in knee brace with NWB order on lt le x 6wks and spends mostly in bed. He was independent in indor/outdoor ambulation and participated in Day Program.    Patient is weak and demonstrates 3+/5 and 3-/5 gross strength in rt and le respectively. Lt knee is currently in knee immobilizer with NWB order on le x 6wks and therefore shows significant functional deficits. Given his mental status, patient currently requires max assist x 1 with bed mobility and needing max assist of 2 paid caregivers for transfer from bed to/from wheelchair. He is very guarded and defensive this visit but later warmed up to this PT stating \"I want to get well\". Patient will benefit from further skilled PT services for therapeutic/home exercise program to ble, caregiver education for safe assistance in bed mobility and ability to perform bed to/from wheelchair transfer without compromising NWB order on lt le.    PMH:  Depression, impulse control disorder, history of BPH, colitis, hyperopia, disease of thyroid gland, shingles, anxiety, anemia, cataracts, diverticulitis, Hydrozone ago, hypertension, seasonal allergies"

## 2022-06-16 ENCOUNTER — HOME CARE VISIT (OUTPATIENT)
Dept: HOME HEALTH SERVICES | Facility: HOME HEALTHCARE | Age: 57
End: 2022-06-16

## 2022-06-16 PROCEDURE — G0151 HHCP-SERV OF PT,EA 15 MIN: HCPCS

## 2022-06-18 ENCOUNTER — HOME CARE VISIT (OUTPATIENT)
Dept: HOME HEALTH SERVICES | Facility: HOME HEALTHCARE | Age: 57
End: 2022-06-18

## 2022-06-18 VITALS
HEART RATE: 86 BPM | SYSTOLIC BLOOD PRESSURE: 122 MMHG | RESPIRATION RATE: 18 BRPM | TEMPERATURE: 98.3 F | OXYGEN SATURATION: 91 % | DIASTOLIC BLOOD PRESSURE: 80 MMHG

## 2022-06-18 NOTE — HOME HEALTH
Patient was much more bright and very cooperative. The following paid caregivers were present this visit: the manager for several of the Apts that accomodate the residents, Radha, the nurse and the paid caregiver assisting patient in his apt this date. They all verbalized good understanding of wheelchair placement and stated good understanding of how this PT transferred patient in compliance with lt le NWB.    The manager requested PT visit scheduled for next Tues to be at 4pm for transfer inservice to all paid caregivers during shift change and this PT agreed.

## 2022-06-20 ENCOUNTER — HOME CARE VISIT (OUTPATIENT)
Dept: HOME HEALTH SERVICES | Facility: HOME HEALTHCARE | Age: 57
End: 2022-06-20

## 2022-07-05 ENCOUNTER — HOME CARE VISIT (OUTPATIENT)
Dept: HOME HEALTH SERVICES | Facility: HOME HEALTHCARE | Age: 57
End: 2022-07-05

## 2022-07-30 ENCOUNTER — HOSPITAL ENCOUNTER (EMERGENCY)
Facility: HOSPITAL | Age: 57
Discharge: NURSING, REHAB OR LONG-TERM CARE | End: 2022-07-31
Attending: EMERGENCY MEDICINE | Admitting: EMERGENCY MEDICINE

## 2022-07-30 VITALS
TEMPERATURE: 97.7 F | SYSTOLIC BLOOD PRESSURE: 128 MMHG | BODY MASS INDEX: 23.54 KG/M2 | OXYGEN SATURATION: 95 % | HEART RATE: 108 BPM | RESPIRATION RATE: 18 BRPM | DIASTOLIC BLOOD PRESSURE: 63 MMHG | WEIGHT: 150 LBS | HEIGHT: 67 IN

## 2022-07-30 DIAGNOSIS — L89.896 PRESSURE INJURY OF DEEP TISSUE OF LEFT KNEE: ICD-10-CM

## 2022-07-30 DIAGNOSIS — S82.032A CLOSED DISPLACED TRANSVERSE FRACTURE OF LEFT PATELLA, INITIAL ENCOUNTER: Primary | ICD-10-CM

## 2022-07-30 PROCEDURE — 99283 EMERGENCY DEPT VISIT LOW MDM: CPT

## 2022-07-31 ENCOUNTER — APPOINTMENT (OUTPATIENT)
Dept: GENERAL RADIOLOGY | Facility: HOSPITAL | Age: 57
End: 2022-07-31

## 2022-07-31 LAB — SARS-COV-2 RNA RESP QL NAA+PROBE: NOT DETECTED

## 2022-07-31 PROCEDURE — U0005 INFEC AGEN DETEC AMPLI PROBE: HCPCS | Performed by: EMERGENCY MEDICINE

## 2022-07-31 PROCEDURE — U0003 INFECTIOUS AGENT DETECTION BY NUCLEIC ACID (DNA OR RNA); SEVERE ACUTE RESPIRATORY SYNDROME CORONAVIRUS 2 (SARS-COV-2) (CORONAVIRUS DISEASE [COVID-19]), AMPLIFIED PROBE TECHNIQUE, MAKING USE OF HIGH THROUGHPUT TECHNOLOGIES AS DESCRIBED BY CMS-2020-01-R: HCPCS | Performed by: EMERGENCY MEDICINE

## 2022-07-31 PROCEDURE — 73560 X-RAY EXAM OF KNEE 1 OR 2: CPT

## 2022-08-03 ENCOUNTER — HOME CARE VISIT (OUTPATIENT)
Dept: HOME HEALTH SERVICES | Facility: HOME HEALTHCARE | Age: 57
End: 2022-08-03

## 2022-08-12 ENCOUNTER — APPOINTMENT (OUTPATIENT)
Dept: GENERAL RADIOLOGY | Facility: HOSPITAL | Age: 57
End: 2022-08-12

## 2022-08-12 ENCOUNTER — HOSPITAL ENCOUNTER (INPATIENT)
Facility: HOSPITAL | Age: 57
LOS: 11 days | Discharge: NURSING, REHAB OR LONG-TERM CARE | End: 2022-08-23
Attending: EMERGENCY MEDICINE | Admitting: INTERNAL MEDICINE

## 2022-08-12 DIAGNOSIS — S82.032A CLOSED DISPLACED TRANSVERSE FRACTURE OF LEFT PATELLA, INITIAL ENCOUNTER: Primary | ICD-10-CM

## 2022-08-12 LAB
ABO GROUP BLD: NORMAL
ALBUMIN SERPL-MCNC: 3.2 G/DL (ref 3.5–5.2)
ALBUMIN/GLOB SERPL: 0.9 G/DL
ALP SERPL-CCNC: 65 U/L (ref 39–117)
ALT SERPL W P-5'-P-CCNC: 13 U/L (ref 1–41)
ANION GAP SERPL CALCULATED.3IONS-SCNC: 7.6 MMOL/L (ref 5–15)
AST SERPL-CCNC: 15 U/L (ref 1–40)
BASOPHILS # BLD AUTO: 0.04 10*3/MM3 (ref 0–0.2)
BASOPHILS NFR BLD AUTO: 0.7 % (ref 0–1.5)
BILIRUB SERPL-MCNC: <0.2 MG/DL (ref 0–1.2)
BLD GP AB SCN SERPL QL: NEGATIVE
BUN SERPL-MCNC: 15 MG/DL (ref 6–20)
BUN/CREAT SERPL: 20 (ref 7–25)
CALCIUM SPEC-SCNC: 8.6 MG/DL (ref 8.6–10.5)
CHLORIDE SERPL-SCNC: 100 MMOL/L (ref 98–107)
CO2 SERPL-SCNC: 31.4 MMOL/L (ref 22–29)
CREAT SERPL-MCNC: 0.75 MG/DL (ref 0.76–1.27)
DEPRECATED RDW RBC AUTO: 42.3 FL (ref 37–54)
EGFRCR SERPLBLD CKD-EPI 2021: 105.3 ML/MIN/1.73
EOSINOPHIL # BLD AUTO: 0.33 10*3/MM3 (ref 0–0.4)
EOSINOPHIL NFR BLD AUTO: 5.5 % (ref 0.3–6.2)
ERYTHROCYTE [DISTWIDTH] IN BLOOD BY AUTOMATED COUNT: 12.5 % (ref 12.3–15.4)
GLOBULIN UR ELPH-MCNC: 3.5 GM/DL
GLUCOSE SERPL-MCNC: 112 MG/DL (ref 65–99)
HCT VFR BLD AUTO: 30.1 % (ref 37.5–51)
HGB BLD-MCNC: 10.1 G/DL (ref 13–17.7)
IMM GRANULOCYTES # BLD AUTO: 0.02 10*3/MM3 (ref 0–0.05)
IMM GRANULOCYTES NFR BLD AUTO: 0.3 % (ref 0–0.5)
LYMPHOCYTES # BLD AUTO: 1.32 10*3/MM3 (ref 0.7–3.1)
LYMPHOCYTES NFR BLD AUTO: 21.9 % (ref 19.6–45.3)
MCH RBC QN AUTO: 31.3 PG (ref 26.6–33)
MCHC RBC AUTO-ENTMCNC: 33.6 G/DL (ref 31.5–35.7)
MCV RBC AUTO: 93.2 FL (ref 79–97)
MONOCYTES # BLD AUTO: 0.72 10*3/MM3 (ref 0.1–0.9)
MONOCYTES NFR BLD AUTO: 11.9 % (ref 5–12)
NEUTROPHILS NFR BLD AUTO: 3.6 10*3/MM3 (ref 1.7–7)
NEUTROPHILS NFR BLD AUTO: 59.7 % (ref 42.7–76)
NRBC BLD AUTO-RTO: 0 /100 WBC (ref 0–0.2)
PLATELET # BLD AUTO: 339 10*3/MM3 (ref 140–450)
PMV BLD AUTO: 9 FL (ref 6–12)
POTASSIUM SERPL-SCNC: 4 MMOL/L (ref 3.5–5.2)
PROT SERPL-MCNC: 6.7 G/DL (ref 6–8.5)
QT INTERVAL: 383 MS
RBC # BLD AUTO: 3.23 10*6/MM3 (ref 4.14–5.8)
RH BLD: POSITIVE
SARS-COV-2 RNA RESP QL NAA+PROBE: NOT DETECTED
SODIUM SERPL-SCNC: 139 MMOL/L (ref 136–145)
T&S EXPIRATION DATE: NORMAL
WBC NRBC COR # BLD: 6.03 10*3/MM3 (ref 3.4–10.8)

## 2022-08-12 PROCEDURE — 71045 X-RAY EXAM CHEST 1 VIEW: CPT

## 2022-08-12 PROCEDURE — 85025 COMPLETE CBC W/AUTO DIFF WBC: CPT | Performed by: EMERGENCY MEDICINE

## 2022-08-12 PROCEDURE — 73560 X-RAY EXAM OF KNEE 1 OR 2: CPT

## 2022-08-12 PROCEDURE — 93005 ELECTROCARDIOGRAM TRACING: CPT | Performed by: EMERGENCY MEDICINE

## 2022-08-12 PROCEDURE — 86850 RBC ANTIBODY SCREEN: CPT | Performed by: EMERGENCY MEDICINE

## 2022-08-12 PROCEDURE — 25010000002 CEFEPIME PER 500 MG: Performed by: INTERNAL MEDICINE

## 2022-08-12 PROCEDURE — U0005 INFEC AGEN DETEC AMPLI PROBE: HCPCS | Performed by: EMERGENCY MEDICINE

## 2022-08-12 PROCEDURE — 93010 ELECTROCARDIOGRAM REPORT: CPT | Performed by: INTERNAL MEDICINE

## 2022-08-12 PROCEDURE — 86900 BLOOD TYPING SEROLOGIC ABO: CPT | Performed by: EMERGENCY MEDICINE

## 2022-08-12 PROCEDURE — 86901 BLOOD TYPING SEROLOGIC RH(D): CPT | Performed by: EMERGENCY MEDICINE

## 2022-08-12 PROCEDURE — 99284 EMERGENCY DEPT VISIT MOD MDM: CPT

## 2022-08-12 PROCEDURE — U0003 INFECTIOUS AGENT DETECTION BY NUCLEIC ACID (DNA OR RNA); SEVERE ACUTE RESPIRATORY SYNDROME CORONAVIRUS 2 (SARS-COV-2) (CORONAVIRUS DISEASE [COVID-19]), AMPLIFIED PROBE TECHNIQUE, MAKING USE OF HIGH THROUGHPUT TECHNOLOGIES AS DESCRIBED BY CMS-2020-01-R: HCPCS | Performed by: EMERGENCY MEDICINE

## 2022-08-12 PROCEDURE — 80053 COMPREHEN METABOLIC PANEL: CPT | Performed by: EMERGENCY MEDICINE

## 2022-08-12 RX ORDER — DOCUSATE SODIUM 100 MG/1
100 CAPSULE, LIQUID FILLED ORAL 2 TIMES DAILY
COMMUNITY
End: 2022-11-09 | Stop reason: HOSPADM

## 2022-08-12 RX ORDER — HYDROCHLOROTHIAZIDE 12.5 MG/1
12.5 CAPSULE, GELATIN COATED ORAL DAILY
COMMUNITY

## 2022-08-12 RX ORDER — ACETAMINOPHEN 325 MG/1
650 TABLET ORAL EVERY 4 HOURS PRN
Status: DISCONTINUED | OUTPATIENT
Start: 2022-08-12 | End: 2022-08-23 | Stop reason: HOSPADM

## 2022-08-12 RX ORDER — ESCITALOPRAM OXALATE 20 MG/1
20 TABLET ORAL DAILY
Status: DISCONTINUED | OUTPATIENT
Start: 2022-08-13 | End: 2022-08-23 | Stop reason: HOSPADM

## 2022-08-12 RX ORDER — UREA 10 %
3 LOTION (ML) TOPICAL NIGHTLY PRN
Status: DISCONTINUED | OUTPATIENT
Start: 2022-08-12 | End: 2022-08-23 | Stop reason: HOSPADM

## 2022-08-12 RX ORDER — AMLODIPINE BESYLATE 5 MG/1
5 TABLET ORAL DAILY
Status: DISCONTINUED | OUTPATIENT
Start: 2022-08-13 | End: 2022-08-23 | Stop reason: HOSPADM

## 2022-08-12 RX ORDER — CEFEPIME 100 G/1
1 INJECTION, POWDER, FOR SOLUTION INTRAVENOUS 2 TIMES DAILY
COMMUNITY
Start: 2022-08-09 | End: 2022-08-23 | Stop reason: HOSPADM

## 2022-08-12 RX ORDER — AMLODIPINE BESYLATE 5 MG/1
5 TABLET ORAL DAILY
COMMUNITY

## 2022-08-12 RX ORDER — HYDROMORPHONE HYDROCHLORIDE 1 MG/ML
0.5 INJECTION, SOLUTION INTRAMUSCULAR; INTRAVENOUS; SUBCUTANEOUS
Status: DISPENSED | OUTPATIENT
Start: 2022-08-12 | End: 2022-08-19

## 2022-08-12 RX ORDER — LEVOTHYROXINE SODIUM 112 UG/1
112 TABLET ORAL EVERY MORNING
Status: DISCONTINUED | OUTPATIENT
Start: 2022-08-13 | End: 2022-08-23 | Stop reason: HOSPADM

## 2022-08-12 RX ORDER — TAMSULOSIN HYDROCHLORIDE 0.4 MG/1
0.4 CAPSULE ORAL DAILY
Status: DISCONTINUED | OUTPATIENT
Start: 2022-08-13 | End: 2022-08-23 | Stop reason: HOSPADM

## 2022-08-12 RX ORDER — ONDANSETRON 4 MG/1
4 TABLET, FILM COATED ORAL EVERY 4 HOURS PRN
COMMUNITY

## 2022-08-12 RX ORDER — PALIPERIDONE 3 MG/1
12 TABLET, EXTENDED RELEASE ORAL DAILY
Status: DISCONTINUED | OUTPATIENT
Start: 2022-08-13 | End: 2022-08-23 | Stop reason: HOSPADM

## 2022-08-12 RX ORDER — HYDROCHLOROTHIAZIDE 12.5 MG/1
12.5 TABLET ORAL DAILY
Status: DISCONTINUED | OUTPATIENT
Start: 2022-08-13 | End: 2022-08-23 | Stop reason: HOSPADM

## 2022-08-12 RX ORDER — ASPIRIN 325 MG
325 TABLET, DELAYED RELEASE (ENTERIC COATED) ORAL DAILY
Status: DISCONTINUED | OUTPATIENT
Start: 2022-08-13 | End: 2022-08-23 | Stop reason: HOSPADM

## 2022-08-12 RX ORDER — DOCUSATE SODIUM 100 MG/1
100 CAPSULE, LIQUID FILLED ORAL 2 TIMES DAILY
Status: DISCONTINUED | OUTPATIENT
Start: 2022-08-12 | End: 2022-08-23 | Stop reason: HOSPADM

## 2022-08-12 RX ORDER — OXYBUTYNIN CHLORIDE 5 MG/1
5 TABLET, EXTENDED RELEASE ORAL 2 TIMES DAILY
COMMUNITY
End: 2022-11-01

## 2022-08-12 RX ORDER — CLONAZEPAM 0.5 MG/1
1 TABLET ORAL 2 TIMES DAILY
Status: DISPENSED | OUTPATIENT
Start: 2022-08-12 | End: 2022-08-19

## 2022-08-12 RX ORDER — HYDROCODONE BITARTRATE AND ACETAMINOPHEN 5; 325 MG/1; MG/1
1 TABLET ORAL EVERY 6 HOURS PRN
Status: DISCONTINUED | OUTPATIENT
Start: 2022-08-12 | End: 2022-08-16

## 2022-08-12 RX ORDER — ONDANSETRON 4 MG/1
4 TABLET, FILM COATED ORAL EVERY 6 HOURS PRN
Status: DISCONTINUED | OUTPATIENT
Start: 2022-08-12 | End: 2022-08-23 | Stop reason: HOSPADM

## 2022-08-12 RX ORDER — ZINC OXIDE 25 %
1 PASTE (GRAM) TOPICAL 2 TIMES DAILY
COMMUNITY

## 2022-08-12 RX ORDER — ACETAMINOPHEN 325 MG/1
650 TABLET ORAL EVERY 6 HOURS PRN
COMMUNITY

## 2022-08-12 RX ORDER — ASPIRIN 325 MG
325 TABLET, DELAYED RELEASE (ENTERIC COATED) ORAL DAILY
COMMUNITY

## 2022-08-12 RX ORDER — LOPERAMIDE HYDROCHLORIDE 2 MG/1
2 CAPSULE ORAL 4 TIMES DAILY PRN
COMMUNITY

## 2022-08-12 RX ORDER — OXYBUTYNIN CHLORIDE 5 MG/1
5 TABLET, EXTENDED RELEASE ORAL 2 TIMES DAILY
Status: DISCONTINUED | OUTPATIENT
Start: 2022-08-12 | End: 2022-08-23 | Stop reason: HOSPADM

## 2022-08-12 RX ORDER — HYDROCODONE BITARTRATE AND ACETAMINOPHEN 5; 325 MG/1; MG/1
1 TABLET ORAL EVERY 4 HOURS PRN
Status: DISCONTINUED | OUTPATIENT
Start: 2022-08-12 | End: 2022-08-12 | Stop reason: SDUPTHER

## 2022-08-12 RX ORDER — PSEUDOEPHEDRINE HCL 120 MG/1
120 TABLET, FILM COATED, EXTENDED RELEASE ORAL EVERY 12 HOURS PRN
COMMUNITY

## 2022-08-12 RX ORDER — SODIUM CHLORIDE 9 MG/ML
75 INJECTION, SOLUTION INTRAVENOUS CONTINUOUS
Status: DISCONTINUED | OUTPATIENT
Start: 2022-08-12 | End: 2022-08-23 | Stop reason: HOSPADM

## 2022-08-12 RX ORDER — NALOXONE HCL 0.4 MG/ML
0.4 VIAL (ML) INJECTION
Status: DISCONTINUED | OUTPATIENT
Start: 2022-08-12 | End: 2022-08-23 | Stop reason: HOSPADM

## 2022-08-12 RX ORDER — ONDANSETRON 2 MG/ML
4 INJECTION INTRAMUSCULAR; INTRAVENOUS EVERY 6 HOURS PRN
Status: DISCONTINUED | OUTPATIENT
Start: 2022-08-12 | End: 2022-08-23 | Stop reason: HOSPADM

## 2022-08-12 RX ADMIN — OXYBUTYNIN CHLORIDE 5 MG: 5 TABLET, EXTENDED RELEASE ORAL at 23:21

## 2022-08-12 RX ADMIN — DOCUSATE SODIUM 100 MG: 100 CAPSULE, LIQUID FILLED ORAL at 22:02

## 2022-08-12 RX ADMIN — Medication: at 23:21

## 2022-08-12 RX ADMIN — CLONAZEPAM 1 MG: 1 TABLET ORAL at 22:02

## 2022-08-12 RX ADMIN — SODIUM CHLORIDE 75 ML/HR: 9 INJECTION, SOLUTION INTRAVENOUS at 22:02

## 2022-08-12 RX ADMIN — CEFEPIME HYDROCHLORIDE 1 G: 1 INJECTION, POWDER, FOR SOLUTION INTRAMUSCULAR; INTRAVENOUS at 23:21

## 2022-08-12 NOTE — PROGRESS NOTES
Clinical Pharmacy Services: Medication History    Lucien Morrison is a 57 y.o. male presenting to [facility] for   Chief Complaint   Patient presents with   • needs leg surgery       He  has a past medical history of Anemia, Anxiety, Cataract, Colitis, Depression, Disease of thyroid gland, Diverticulitis, Hemorrhoids, History of BPH, Hydrocele in adult, Hyperopia, Hypertension, Impulse control disorder, Seasonal allergies, and Shingles.    Allergies as of 08/12/2022   • (No Known Allergies)       Medication information was obtained from: skilled nursing Paperwork  Pharmacy and Phone Number:     Prior to Admission Medications     Prescriptions Last Dose Informant Patient Reported? Taking?    amLODIPine (NORVASC) 5 MG tablet 8/11/2022 Nursing Home Yes Yes    Take 5 mg by mouth Daily.    aspirin  MG tablet 8/11/2022 Nursing Home Yes Yes    Take 325 mg by mouth Daily.    Cefepime HCl 100 g reconstituted solution 8/11/2022 Nursing Home Yes Yes    Infuse 1 g into a venous catheter 2 (Two) Times a Day.    clonazePAM (KlonoPIN) 1 MG tablet 8/11/2022 Nursing Home Yes Yes    Take 1 mg by mouth 2 (Two) Times a Day.    docusate sodium (COLACE) 100 MG capsule 8/11/2022 Nursing Home Yes Yes    Take 100 mg by mouth 2 (Two) Times a Day.    escitalopram (LEXAPRO) 20 MG tablet 8/11/2022 Nursing Home Yes Yes    Take 20 mg by mouth Daily.    hydroCHLOROthiazide (MICROZIDE) 12.5 MG capsule 8/11/2022 Nursing Home Yes Yes    Take 12.5 mg by mouth Daily.    levothyroxine (SYNTHROID, LEVOTHROID) 125 MCG tablet 8/11/2022 Nursing Home Yes Yes    Take 112 mcg by mouth Daily.    oxybutynin XL (DITROPAN-XL) 5 MG 24 hr tablet 8/11/2022 Nursing Home Yes Yes    Take 5 mg by mouth 2 (Two) Times a Day.    paliperidone (INVEGA) 6 MG 24 hr tablet 8/11/2022 Nursing Home Yes Yes    Take 12 mg by mouth Daily.    tamsulosin (FLOMAX) 0.4 MG capsule 24 hr capsule 8/11/2022 Nursing Home Yes Yes    Take 1 capsule by mouth Daily.    Zinc Oxide  25 % paste 8/11/2022 Nursing Home Yes Yes    Apply 1 application topically to the appropriate area as directed 2 (Two) Times a Day. Apply To Buttocks    acetaminophen (TYLENOL) 325 MG tablet 8/3/2022 Nursing Home Yes No    Take 650 mg by mouth Every 6 (Six) Hours As Needed for Mild Pain . 1-2 Tablets    HYDROcodone-acetaminophen (NORCO) 5-325 MG per tablet   No No    Take 1 tablet by mouth Every 6 (Six) Hours As Needed for Moderate Pain .    loperamide (IMODIUM) 2 MG capsule 7/27/2022 Nursing Home Yes No    Take 2 mg by mouth 4 (Four) Times a Day As Needed for Diarrhea.    ondansetron (ZOFRAN) 4 MG tablet  Nursing Home Yes No    Take 4 mg by mouth Every 4 (Four) Hours As Needed for Nausea or Vomiting.    pseudoephedrine (SUDAFED) 120 MG 12 hr tablet 7/22/2022 Nursing Home Yes No    Take 120 mg by mouth Every 12 (Twelve) Hours As Needed for Congestion.            Medication notes:     This medication list is complete to the best of my knowledge as of 8/12/2022    Please call if questions.    Saira Heck  Medication History Technician   [phone]    8/12/2022 19:45 EDT

## 2022-08-12 NOTE — ED NOTES
Attempted to call report twice, no one had come to the phone one time and they let me know they would call me back also.

## 2022-08-12 NOTE — ED PROVIDER NOTES
EMERGENCY DEPARTMENT ENCOUNTER    Room Number:  17/17  PCP: Ran Bragg MD  Historian: Patient  History Limited By: Nothing      HPI  Chief Complaint: Left knee injury  Context: Lucien Morrison is a 57 y.o. male who presents to the ED c/o left knee injury.  Patient from nursing home sent here for knee surgery.  Patient had a fall and had a fracture of his left knee.  Patient has chronic wound on the left knee as well.  Has been seen by orthopedist and sent in for evaluation and admission for repair.      Location: Left knee  Radiation: None  Character: Aching  Duration: 2 weeks  Severity: Moderate  Progression: Not improving  Aggravating Factors: Nothing  Alleviating Factors: Nothing        MEDICAL RECORD REVIEW    Fall with transverse fracture of patella          PAST MEDICAL HISTORY  Active Ambulatory Problems     Diagnosis Date Noted   • No Active Ambulatory Problems     Resolved Ambulatory Problems     Diagnosis Date Noted   • No Resolved Ambulatory Problems     Past Medical History:   Diagnosis Date   • Anemia    • Anxiety    • Cataract    • Colitis    • Depression    • Disease of thyroid gland    • Diverticulitis    • Hemorrhoids    • History of BPH    • Hydrocele in adult    • Hyperopia    • Hypertension    • Impulse control disorder    • Seasonal allergies    • Shingles          PAST SURGICAL HISTORY  Past Surgical History:   Procedure Laterality Date   • BACK SURGERY      nodule removed   • ORCHIECTOMY Left    • ORIF HIP FRACTURE Right          FAMILY HISTORY  No family history on file.      SOCIAL HISTORY  Social History     Socioeconomic History   • Marital status: Single   Tobacco Use   • Smoking status: Never Smoker   Vaping Use   • Vaping Use: Never used   Substance and Sexual Activity   • Alcohol use: Never   • Drug use: Never   • Sexual activity: Defer         ALLERGIES  Patient has no known allergies.        REVIEW OF SYSTEMS  Review of Systems   Constitutional: Negative for activity change,  appetite change and fever.   HENT: Negative for congestion and sore throat.    Eyes: Negative.    Respiratory: Negative for cough and shortness of breath.    Cardiovascular: Negative for chest pain and leg swelling.   Gastrointestinal: Negative for abdominal pain, diarrhea and vomiting.   Endocrine: Negative.    Genitourinary: Negative for decreased urine volume and dysuria.   Musculoskeletal: Positive for myalgias. Negative for neck pain.   Skin: Negative for rash and wound.   Allergic/Immunologic: Negative.    Neurological: Negative for weakness, numbness and headaches.   Hematological: Negative.    Psychiatric/Behavioral: Negative.    All other systems reviewed and are negative.           PHYSICAL EXAM  ED Triage Vitals [08/12/22 1609]   Temp Heart Rate Resp BP SpO2   96.7 °F (35.9 °C) 91 16 -- 94 %      Temp src Heart Rate Source Patient Position BP Location FiO2 (%)   Tympanic Monitor -- -- --       Physical Exam  Vitals and nursing note reviewed.   Constitutional:       General: He is not in acute distress.  HENT:      Head: Normocephalic and atraumatic.   Eyes:      Pupils: Pupils are equal, round, and reactive to light.   Cardiovascular:      Rate and Rhythm: Normal rate and regular rhythm.      Heart sounds: Normal heart sounds.   Pulmonary:      Effort: Pulmonary effort is normal. No respiratory distress.      Breath sounds: Normal breath sounds.   Abdominal:      Palpations: Abdomen is soft.      Tenderness: There is no abdominal tenderness. There is no guarding or rebound.   Musculoskeletal:         General: Tenderness present. Normal range of motion.      Cervical back: Normal range of motion and neck supple.   Skin:     General: Skin is warm and dry.      Comments: Wound on top of left knee   Neurological:      Mental Status: He is alert and oriented to person, place, and time.      Sensory: Sensation is intact. No sensory deficit.      Motor: No weakness.   Psychiatric:         Mood and Affect: Mood  and affect normal.       Patient was wearing a face mask when I entered the room and they continued to wear a mask throughout their stay in the ED.  I wore PPE, including  gloves, face mask with shield or face mask with goggles whenever I was in the room with patient.       LAB RESULTS  Recent Results (from the past 24 hour(s))   Comprehensive Metabolic Panel    Collection Time: 08/12/22  5:16 PM    Specimen: Blood   Result Value Ref Range    Glucose 112 (H) 65 - 99 mg/dL    BUN 15 6 - 20 mg/dL    Creatinine 0.75 (L) 0.76 - 1.27 mg/dL    Sodium 139 136 - 145 mmol/L    Potassium 4.0 3.5 - 5.2 mmol/L    Chloride 100 98 - 107 mmol/L    CO2 31.4 (H) 22.0 - 29.0 mmol/L    Calcium 8.6 8.6 - 10.5 mg/dL    Total Protein 6.7 6.0 - 8.5 g/dL    Albumin 3.20 (L) 3.50 - 5.20 g/dL    ALT (SGPT) 13 1 - 41 U/L    AST (SGOT) 15 1 - 40 U/L    Alkaline Phosphatase 65 39 - 117 U/L    Total Bilirubin <0.2 0.0 - 1.2 mg/dL    Globulin 3.5 gm/dL    A/G Ratio 0.9 g/dL    BUN/Creatinine Ratio 20.0 7.0 - 25.0    Anion Gap 7.6 5.0 - 15.0 mmol/L    eGFR 105.3 >60.0 mL/min/1.73   CBC Auto Differential    Collection Time: 08/12/22  5:16 PM    Specimen: Blood   Result Value Ref Range    WBC 6.03 3.40 - 10.80 10*3/mm3    RBC 3.23 (L) 4.14 - 5.80 10*6/mm3    Hemoglobin 10.1 (L) 13.0 - 17.7 g/dL    Hematocrit 30.1 (L) 37.5 - 51.0 %    MCV 93.2 79.0 - 97.0 fL    MCH 31.3 26.6 - 33.0 pg    MCHC 33.6 31.5 - 35.7 g/dL    RDW 12.5 12.3 - 15.4 %    RDW-SD 42.3 37.0 - 54.0 fl    MPV 9.0 6.0 - 12.0 fL    Platelets 339 140 - 450 10*3/mm3    Neutrophil % 59.7 42.7 - 76.0 %    Lymphocyte % 21.9 19.6 - 45.3 %    Monocyte % 11.9 5.0 - 12.0 %    Eosinophil % 5.5 0.3 - 6.2 %    Basophil % 0.7 0.0 - 1.5 %    Immature Grans % 0.3 0.0 - 0.5 %    Neutrophils, Absolute 3.60 1.70 - 7.00 10*3/mm3    Lymphocytes, Absolute 1.32 0.70 - 3.10 10*3/mm3    Monocytes, Absolute 0.72 0.10 - 0.90 10*3/mm3    Eosinophils, Absolute 0.33 0.00 - 0.40 10*3/mm3    Basophils, Absolute 0.04  0.00 - 0.20 10*3/mm3    Immature Grans, Absolute 0.02 0.00 - 0.05 10*3/mm3    nRBC 0.0 0.0 - 0.2 /100 WBC   Type & Screen    Collection Time: 08/12/22  5:16 PM    Specimen: Blood   Result Value Ref Range    ABO Type A     RH type Positive     Antibody Screen Negative     T&S Expiration Date 8/15/2022 11:59:59 PM    ECG 12 Lead    Collection Time: 08/12/22  5:18 PM   Result Value Ref Range    QT Interval 383 ms       Ordered the above labs and reviewed the results.        RADIOLOGY  XR Chest 1 View   Final Result      XR Knee 1 or 2 View Left   Final Result       As described.               This report was finalized on 8/12/2022 5:24 PM by Dr. Junior Jacobs M.D.               Ordered the above noted radiological studies. Reviewed by me in PACS.        PROCEDURES  Procedures      EKG:          EKG time: 1718  Rhythm/Rate: Normal sinus rhythm 97  P waves and ME: Normal P waves  QRS, axis: Normal QRS  ST and T waves: Normal ST-T wave    Interpreted Contemporaneously by me, independently viewed  Unchanged compared to prior 6/8/2022        MEDICATIONS GIVEN IN ER  Medications   sodium chloride 0.9 % infusion (has no administration in time range)   acetaminophen (TYLENOL) tablet 650 mg (has no administration in time range)   HYDROcodone-acetaminophen (NORCO) 5-325 MG per tablet 1 tablet (has no administration in time range)   HYDROmorphone (DILAUDID) injection 0.5 mg (has no administration in time range)     And   naloxone (NARCAN) injection 0.4 mg (has no administration in time range)   ondansetron (ZOFRAN) tablet 4 mg (has no administration in time range)     Or   ondansetron (ZOFRAN) injection 4 mg (has no administration in time range)   melatonin tablet 3 mg (has no administration in time range)             PROGRESS AND CONSULTS  ED Course as of 08/12/22 1913   Fri Aug 12, 2022   1817 18:17 EDT  Patient presents for admission for left knee surgery.  Patient has patellar fracture as well as probable patellar  tendon rupture.  Patient seen by orthopedist and sent in for admission.  Patient has been discussed with Dr. Peralta who will admit. [SL]      ED Course User Index  [SL] Rodriguez Matias MD           MEDICAL DECISION MAKING      MDM  Number of Diagnoses or Management Options     Amount and/or Complexity of Data Reviewed  Clinical lab tests: reviewed and ordered (White blood cell count 6)  Tests in the radiology section of CPT®: reviewed and ordered (Patellar fracture)  Discuss the patient with other providers: yes (Discussed with Dr. Figueroa and then Dr. Peralta who will admit.)               DIAGNOSIS  Final diagnoses:   Closed displaced transverse fracture of left patella, initial encounter           DISPOSITION  admit        Latest Documented Vital Signs:  As of 19:13 EDT  BP- 134/90 HR- 82 Temp- 96.7 °F (35.9 °C) (Tympanic) O2 sat- 94%                           Rodriguez Matias MD  08/12/22 4011

## 2022-08-12 NOTE — ED TRIAGE NOTES
Pt needs surgery on his left leg and dr parish sent him to ER to be admitted    Patient was placed in face mask during first look triage.  Patient was wearing a face mask throughout encounter.  I wore personal protective equipment throughout the encounter.  Hand hygiene was performed before and after patient encounter.

## 2022-08-13 ENCOUNTER — APPOINTMENT (OUTPATIENT)
Dept: MRI IMAGING | Facility: HOSPITAL | Age: 57
End: 2022-08-13

## 2022-08-13 LAB
ANION GAP SERPL CALCULATED.3IONS-SCNC: 8 MMOL/L (ref 5–15)
BUN SERPL-MCNC: 12 MG/DL (ref 6–20)
BUN/CREAT SERPL: 21.4 (ref 7–25)
CALCIUM SPEC-SCNC: 8.4 MG/DL (ref 8.6–10.5)
CHLORIDE SERPL-SCNC: 106 MMOL/L (ref 98–107)
CO2 SERPL-SCNC: 29 MMOL/L (ref 22–29)
CREAT SERPL-MCNC: 0.56 MG/DL (ref 0.76–1.27)
CRP SERPL-MCNC: 1.68 MG/DL (ref 0–0.5)
DEPRECATED RDW RBC AUTO: 42.7 FL (ref 37–54)
EGFRCR SERPLBLD CKD-EPI 2021: 115 ML/MIN/1.73
ERYTHROCYTE [DISTWIDTH] IN BLOOD BY AUTOMATED COUNT: 12.4 % (ref 12.3–15.4)
ERYTHROCYTE [SEDIMENTATION RATE] IN BLOOD: 49 MM/HR (ref 0–20)
GLUCOSE SERPL-MCNC: 95 MG/DL (ref 65–99)
HCT VFR BLD AUTO: 29.8 % (ref 37.5–51)
HGB BLD-MCNC: 9.6 G/DL (ref 13–17.7)
MCH RBC QN AUTO: 30.7 PG (ref 26.6–33)
MCHC RBC AUTO-ENTMCNC: 32.2 G/DL (ref 31.5–35.7)
MCV RBC AUTO: 95.2 FL (ref 79–97)
PLATELET # BLD AUTO: 313 10*3/MM3 (ref 140–450)
PMV BLD AUTO: 9.2 FL (ref 6–12)
POTASSIUM SERPL-SCNC: 3.8 MMOL/L (ref 3.5–5.2)
RBC # BLD AUTO: 3.13 10*6/MM3 (ref 4.14–5.8)
SODIUM SERPL-SCNC: 143 MMOL/L (ref 136–145)
WBC NRBC COR # BLD: 5.39 10*3/MM3 (ref 3.4–10.8)

## 2022-08-13 PROCEDURE — 73723 MRI JOINT LWR EXTR W/O&W/DYE: CPT

## 2022-08-13 PROCEDURE — 86140 C-REACTIVE PROTEIN: CPT | Performed by: ORTHOPAEDIC SURGERY

## 2022-08-13 PROCEDURE — 85652 RBC SED RATE AUTOMATED: CPT | Performed by: ORTHOPAEDIC SURGERY

## 2022-08-13 PROCEDURE — 25010000002 CEFEPIME PER 500 MG: Performed by: INTERNAL MEDICINE

## 2022-08-13 PROCEDURE — 36415 COLL VENOUS BLD VENIPUNCTURE: CPT | Performed by: INTERNAL MEDICINE

## 2022-08-13 PROCEDURE — 80048 BASIC METABOLIC PNL TOTAL CA: CPT | Performed by: INTERNAL MEDICINE

## 2022-08-13 PROCEDURE — A9577 INJ MULTIHANCE: HCPCS | Performed by: HOSPITALIST

## 2022-08-13 PROCEDURE — 0 GADOBENATE DIMEGLUMINE 529 MG/ML SOLUTION: Performed by: HOSPITALIST

## 2022-08-13 PROCEDURE — 85027 COMPLETE CBC AUTOMATED: CPT | Performed by: INTERNAL MEDICINE

## 2022-08-13 RX ADMIN — Medication: at 09:14

## 2022-08-13 RX ADMIN — ESCITALOPRAM 20 MG: 20 TABLET, FILM COATED ORAL at 09:10

## 2022-08-13 RX ADMIN — AMLODIPINE BESYLATE 5 MG: 5 TABLET ORAL at 09:10

## 2022-08-13 RX ADMIN — CEFEPIME HYDROCHLORIDE 1 G: 1 INJECTION, POWDER, FOR SOLUTION INTRAMUSCULAR; INTRAVENOUS at 11:05

## 2022-08-13 RX ADMIN — HYDROCHLOROTHIAZIDE 12.5 MG: 12.5 TABLET ORAL at 09:10

## 2022-08-13 RX ADMIN — PALIPERIDONE 12 MG: 6 TABLET, EXTENDED RELEASE ORAL at 09:11

## 2022-08-13 RX ADMIN — Medication: at 20:25

## 2022-08-13 RX ADMIN — TAMSULOSIN HYDROCHLORIDE 0.4 MG: 0.4 CAPSULE ORAL at 09:11

## 2022-08-13 RX ADMIN — DOCUSATE SODIUM 100 MG: 100 CAPSULE, LIQUID FILLED ORAL at 20:25

## 2022-08-13 RX ADMIN — CLONAZEPAM 1 MG: 1 TABLET ORAL at 09:10

## 2022-08-13 RX ADMIN — OXYBUTYNIN CHLORIDE 5 MG: 5 TABLET, EXTENDED RELEASE ORAL at 09:11

## 2022-08-13 RX ADMIN — CEFEPIME HYDROCHLORIDE 1 G: 1 INJECTION, POWDER, FOR SOLUTION INTRAMUSCULAR; INTRAVENOUS at 16:00

## 2022-08-13 RX ADMIN — DOCUSATE SODIUM 100 MG: 100 CAPSULE, LIQUID FILLED ORAL at 09:10

## 2022-08-13 RX ADMIN — OXYBUTYNIN CHLORIDE 5 MG: 5 TABLET, EXTENDED RELEASE ORAL at 20:25

## 2022-08-13 RX ADMIN — CEFEPIME HYDROCHLORIDE 1 G: 1 INJECTION, POWDER, FOR SOLUTION INTRAMUSCULAR; INTRAVENOUS at 23:35

## 2022-08-13 RX ADMIN — GADOBENATE DIMEGLUMINE 15 ML: 529 INJECTION, SOLUTION INTRAVENOUS at 18:19

## 2022-08-13 RX ADMIN — CLONAZEPAM 1 MG: 1 TABLET ORAL at 20:25

## 2022-08-13 NOTE — PLAN OF CARE
Goal Outcome Evaluation:  Plan of Care Reviewed With: patient, sibling        Progress: no change  Outcome Evaluation: Pt 57/M alert to self admitted with left knee pain. closed patella fracture.  History of infection. Midline in place. Pt currently resides at [facility].  IV abx given. MRI ordered and done. Plan is for possible surgery on Monday. Will continue to monitor. Unable to educate

## 2022-08-13 NOTE — H&P
HISTORY AND PHYSICAL   [facility]        Date of Admission: 2022  Patient Identification:  Name: Lucien Morrison  Age: 57 y.o.  Sex: male  :  1965  MRN: [id number]                     Primary Care Physician: Ran Bragg MD    Chief Complaint:  57 year old female who presented to the emergency room from dr parish's office; he has a history of left knee pain and injury; he has a fracture and chronic wound and ortho felt that he was going to need surgical intervention; he denies fever or chills; he is not able to give a good history due to cognitive issues and no family is present; he is aware that surgery is planned and is in agreement because he wants to get better    History of Present Illness:   As above     Past Medical History:  Past Medical History:   Diagnosis Date   • Anemia    • Anxiety    • Cataract     bilateral   • Colitis    • Depression    • Disease of thyroid gland    • Diverticulitis    • Hemorrhoids    • History of BPH    • Hydrocele in adult    • Hyperopia    • Hypertension    • Impulse control disorder    • Seasonal allergies    • Shingles      Past Surgical History:  Past Surgical History:   Procedure Laterality Date   • BACK SURGERY      nodule removed   • ORCHIECTOMY Left    • ORIF HIP FRACTURE Right       Home Meds:  (Not in a hospital admission)      Allergies:  No Known Allergies  Immunizations:    There is no immunization history on file for this patient.  Social History:   Social History     Social History Narrative   • Not on file     Social History     Socioeconomic History   • Marital status: Single   Tobacco Use   • Smoking status: Never Smoker   Vaping Use   • Vaping Use: Never used   Substance and Sexual Activity   • Alcohol use: Never   • Drug use: Never   • Sexual activity: Defer       Family History:  No family history on file.     Review of Systems  Not obtainable; patient is poor historian    Objective:  T Max 24 hrs: Temp (24hrs), Av.7 °F (35.9  °C), Min:96.7 °F (35.9 °C), Max:96.7 °F (35.9 °C)    Vitals Ranges:   Temp:  [96.7 °F (35.9 °C)] 96.7 °F (35.9 °C)  Heart Rate:  [82-91] 82  Resp:  [16] 16  BP: (113-147)/(69-95) 134/90      Exam:  /90   Pulse 82   Temp 96.7 °F (35.9 °C) (Tympanic)   Resp 16   SpO2 94%     General Appearance:    Alert, cooperative, no distress, appears stated age; chronically ill appearing   Head:    Normocephalic, without obvious abnormality, atraumatic   Eyes:    PERRL, conjunctivae/corneas clear, EOM's intact, both eyes   Ears:    Normal external ear canals, both ears   Nose:   Nares normal, septum midline, mucosa normal, no drainage    or sinus tenderness   Throat:   Lips, mucosa, and tongue normal   Neck:   Supple, symmetrical, trachea midline, no adenopathy;     thyroid:  no enlargement/tenderness/nodules; no carotid    bruit or JVD   Back:     Symmetric, no curvature, ROM normal, no CVA tenderness   Lungs:     Clear to auscultation bilaterally, respirations unlabored   Chest Wall:    No tenderness or deformity    Heart:    Regular rate and rhythm, S1 and S2 normal, no murmur, rub   or gallop   Abdomen:     Soft, nontender, bowel sounds active all four quadrants,     no masses, no hepatomegaly, no splenomegaly   Extremities:   Extremities normal, atraumatic, left knee with dressing, bandage   Pulses:   2+ and symmetric all extremities   Skin:   Skin color, texture, turgor normal, no rashes or lesions   Lymph nodes:   Cervical, supraclavicular, and axillary nodes normal   Neurologic:   CNII-XII intact, normal strength, sensation intact throughout      .    Data Review:  Labs in chart were reviewed.  WBC   Date Value Ref Range Status   08/12/2022 6.03 3.40 - 10.80 10*3/mm3 Final     Hemoglobin   Date Value Ref Range Status   08/12/2022 10.1 (L) 13.0 - 17.7 g/dL Final     Hematocrit   Date Value Ref Range Status   08/12/2022 30.1 (L) 37.5 - 51.0 % Final     Platelets   Date Value Ref Range Status   08/12/2022 339 140 -  450 10*3/mm3 Final     Sodium   Date Value Ref Range Status   08/12/2022 139 136 - 145 mmol/L Final     Potassium   Date Value Ref Range Status   08/12/2022 4.0 3.5 - 5.2 mmol/L Final     Chloride   Date Value Ref Range Status   08/12/2022 100 98 - 107 mmol/L Final     CO2   Date Value Ref Range Status   08/12/2022 31.4 (H) 22.0 - 29.0 mmol/L Final     BUN   Date Value Ref Range Status   08/12/2022 15 6 - 20 mg/dL Final     Creatinine   Date Value Ref Range Status   08/12/2022 0.75 (L) 0.76 - 1.27 mg/dL Final     Glucose   Date Value Ref Range Status   08/12/2022 112 (H) 65 - 99 mg/dL Final     Calcium   Date Value Ref Range Status   08/12/2022 8.6 8.6 - 10.5 mg/dL Final     AST (SGOT)   Date Value Ref Range Status   08/12/2022 15 1 - 40 U/L Final     ALT (SGPT)   Date Value Ref Range Status   08/12/2022 13 1 - 41 U/L Final     Alkaline Phosphatase   Date Value Ref Range Status   08/12/2022 65 39 - 117 U/L Final     No results found for: APTT, INR             Imaging Results (All)     Procedure Component Value Units Date/Time    XR Chest 1 View [[id number]] Collected: 08/12/22 1844     Updated: 08/12/22 1849    Narrative:      XR CHEST 1 VW-     HISTORY:  Preop.     COMPARISON:  None     FINDINGS:    A single portable view of the chest was obtained. There is a left PICC.  The cardiac silhouette is upper normal to mildly enlarged. The aorta is  tortuous. There are prominent bibasilar interstitial opacities, which  could reflect atelectasis/scarring, although interstitial pulmonary  edema could have a similar appearance. Pleural spaces are clear. There  is multilevel degenerative disc disease. There is partially imaged  surgical hardware in the proximal right humerus.     This report was finalized on 8/12/2022 6:46 PM by Dr. Janeen Michel M.D.       XR Knee 1 or 2 View Left [[id number]] Collected: 08/12/22 1722     Updated: 08/12/22 1727    Narrative:      XR KNEE 1 OR 2 VW LEFT-     INDICATIONS: Trauma     TECHNIQUE: 2  views of the left knee     COMPARISON: None available     FINDINGS:     Fracture fragments of the patella are evident, and the patella is  elevated, that may in part be from straight leg positioning, but  certainly raises suspicion for patellar tendon injury/disruption,  correlate with clinical exam. No other evidence of fracture is  identified.       Impression:         As described.           This report was finalized on 8/12/2022 5:24 PM by Dr. Junior Jacobs M.D.               Assessment:  Active Hospital Problems    Diagnosis  POA   • Closed displaced transverse fracture of left patella [S82.032A]  Yes      Resolved Hospital Problems   No resolved problems to display.   hypertension  Hypothyroidism      Plan:  Surgery is planned  Monitor blood pressure  D.w patient and ED Provider  Await further input from ortho  Continue nh meds    Michela Peralta MD  8/12/2022  20:01 EDT

## 2022-08-13 NOTE — CONSULTS
Orthopaedic Surgery  Consult Note  Dr. ELKIN Weston” [facility]  [phone]    HPI:  Patient is a 57 y.o. Not  or  male with a history of a left patella fracture.  He has developmental delay.  He underwent uncomplicated open reduction and internal fixation.  He was sent to a rehab facility where he had a difficult time protecting his leg due to his mental capacity.  He was bending the knee and putting a lot of weight on it.  Unfortunately, that led to a malunion of the patella.  He was then brought back to my clinic yesterday with a wound to the anterior knee and exposed FiberWire and bone.  I sent him immediately to the ER to be admitted.  X-rays in the ER demonstrated a high riding patella with concern for possible patellar tendon injury, which would be a new injury.  This morning he is sleeping with no complaints of pain.    MEDICAL HISTORY  Past Medical History:   Diagnosis Date   • Anemia    • Anxiety    • Cataract     bilateral   • Colitis    • Depression    • Disease of thyroid gland    • Diverticulitis    • Hemorrhoids    • History of BPH    • Hydrocele in adult    • Hyperopia    • Hypertension    • Impulse control disorder    • Seasonal allergies    • Shingles    ·   Past Surgical History:   Procedure Laterality Date   • BACK SURGERY      nodule removed   • ORCHIECTOMY Left    • ORIF HIP FRACTURE Right    ·   Prior to Admission medications    Medication Sig Start Date End Date Taking? Authorizing Provider   amLODIPine (NORVASC) 5 MG tablet Take 5 mg by mouth Daily.   Yes Calvin Chester MD   aspirin  MG tablet Take 325 mg by mouth Daily.   Yes Calvin Chester MD   Cefepime HCl 100 g reconstituted solution Infuse 1 g into a venous catheter 2 (Two) Times a Day. 8/9/22 8/23/22 Yes Calvin Chester MD   clonazePAM (KlonoPIN) 1 MG tablet Take 1 mg by mouth 2 (Two) Times a Day. 1/1/20  Yes Dylan Abel MD   docusate sodium (COLACE) 100 MG capsule Take 100 mg by mouth 2 (Two)  Times a Day.   Yes Calvin Chester MD   escitalopram (LEXAPRO) 20 MG tablet Take 20 mg by mouth Daily. 1/1/20  Yes Lorna Robersno MD   hydroCHLOROthiazide (MICROZIDE) 12.5 MG capsule Take 12.5 mg by mouth Daily.   Yes Calvin Chester MD   levothyroxine (SYNTHROID, LEVOTHROID) 125 MCG tablet Take 112 mcg by mouth Daily. 1/1/20  Yes Maki Bal APRN   oxybutynin XL (DITROPAN-XL) 5 MG 24 hr tablet Take 5 mg by mouth 2 (Two) Times a Day.   Yes Calvin Chester MD   paliperidone (INVEGA) 6 MG 24 hr tablet Take 12 mg by mouth Daily. 1/1/20  Yes Lorna Roberson MD   tamsulosin (FLOMAX) 0.4 MG capsule 24 hr capsule Take 1 capsule by mouth Daily.   Yes Lindsay Juarez MD   Zinc Oxide 25 % paste Apply 1 application topically to the appropriate area as directed 2 (Two) Times a Day. Apply To Buttocks   Yes Calvin Chester MD   acetaminophen (TYLENOL) 325 MG tablet Take 650 mg by mouth Every 6 (Six) Hours As Needed for Mild Pain . 1-2 Tablets    Calvin Chester MD   HYDROcodone-acetaminophen (NORCO) 5-325 MG per tablet Take 1 tablet by mouth Every 6 (Six) Hours As Needed for Moderate Pain . 6/3/22   Lindsay Felix APRN   loperamide (IMODIUM) 2 MG capsule Take 2 mg by mouth 4 (Four) Times a Day As Needed for Diarrhea.    Calvin Chester MD   ondansetron (ZOFRAN) 4 MG tablet Take 4 mg by mouth Every 4 (Four) Hours As Needed for Nausea or Vomiting.    Calvin Chester MD   pseudoephedrine (SUDAFED) 120 MG 12 hr tablet Take 120 mg by mouth Every 12 (Twelve) Hours As Needed for Congestion.    Calvin Chester MD   ·   · No Known Allergies  ·   · There is no immunization history on file for this patient.  Social History     Tobacco Use   • Smoking status: Never Smoker   • Smokeless tobacco: Not on file   Substance Use Topics   • Alcohol use: Never   ·    Social History     Substance and Sexual Activity   Drug Use Never   ·     VITALS: /79 (BP Location: Right arm,  Patient Position: Lying)   Pulse 74   Temp 96.7 °F (35.9 °C) (Oral)   Resp 16   SpO2 95%  There is no height or weight on file to calculate BMI.    PHYSICAL EXAM:   · CONSTITUTIONAL: No acute distress  · LUNGS: Equal chest rise, no shortness of air  · CARDIOVASCULAR: palpable peripheral pulses  · SKIN: no skin lesions in the area examined  · LYMPH: no lymphadenopathy in the area examined  · EXTREMITY: Left Lower Extremity  · Tenderness to Palpation: No tenderness to palpation  · Gross Deformity: 2 x 2 centimeter full-thickness wound in the anterior knee with exposed patella and patellar tendon  · Pulses:  Brisk Capillary Refill  · Sensation: Intact to Saphenous, Sural, Deep Peroneal, Superficial Peroneal, and Tibial Nerves and grossly throughout extremity  · Motor: 5/5 EHL/FHL/TA/GS motor complexes    RADIOLOGY REVIEW:   XR Knee 1 or 2 View Left    Result Date: 8/12/2022   As described.    This report was finalized on 8/12/2022 5:24 PM by Dr. Junior Jacobs M.D.        LABS:   Results for the past 24 hours:   Recent Results (from the past 24 hour(s))   Comprehensive Metabolic Panel    Collection Time: 08/12/22  5:16 PM    Specimen: Blood   Result Value Ref Range    Glucose 112 (H) 65 - 99 mg/dL    BUN 15 6 - 20 mg/dL    Creatinine 0.75 (L) 0.76 - 1.27 mg/dL    Sodium 139 136 - 145 mmol/L    Potassium 4.0 3.5 - 5.2 mmol/L    Chloride 100 98 - 107 mmol/L    CO2 31.4 (H) 22.0 - 29.0 mmol/L    Calcium 8.6 8.6 - 10.5 mg/dL    Total Protein 6.7 6.0 - 8.5 g/dL    Albumin 3.20 (L) 3.50 - 5.20 g/dL    ALT (SGPT) 13 1 - 41 U/L    AST (SGOT) 15 1 - 40 U/L    Alkaline Phosphatase 65 39 - 117 U/L    Total Bilirubin <0.2 0.0 - 1.2 mg/dL    Globulin 3.5 gm/dL    A/G Ratio 0.9 g/dL    BUN/Creatinine Ratio 20.0 7.0 - 25.0    Anion Gap 7.6 5.0 - 15.0 mmol/L    eGFR 105.3 >60.0 mL/min/1.73   CBC Auto Differential    Collection Time: 08/12/22  5:16 PM    Specimen: Blood   Result Value Ref Range    WBC 6.03 3.40 - 10.80 10*3/mm3     RBC 3.23 (L) 4.14 - 5.80 10*6/mm3    Hemoglobin 10.1 (L) 13.0 - 17.7 g/dL    Hematocrit 30.1 (L) 37.5 - 51.0 %    MCV 93.2 79.0 - 97.0 fL    MCH 31.3 26.6 - 33.0 pg    MCHC 33.6 31.5 - 35.7 g/dL    RDW 12.5 12.3 - 15.4 %    RDW-SD 42.3 37.0 - 54.0 fl    MPV 9.0 6.0 - 12.0 fL    Platelets 339 140 - 450 10*3/mm3    Neutrophil % 59.7 42.7 - 76.0 %    Lymphocyte % 21.9 19.6 - 45.3 %    Monocyte % 11.9 5.0 - 12.0 %    Eosinophil % 5.5 0.3 - 6.2 %    Basophil % 0.7 0.0 - 1.5 %    Immature Grans % 0.3 0.0 - 0.5 %    Neutrophils, Absolute 3.60 1.70 - 7.00 10*3/mm3    Lymphocytes, Absolute 1.32 0.70 - 3.10 10*3/mm3    Monocytes, Absolute 0.72 0.10 - 0.90 10*3/mm3    Eosinophils, Absolute 0.33 0.00 - 0.40 10*3/mm3    Basophils, Absolute 0.04 0.00 - 0.20 10*3/mm3    Immature Grans, Absolute 0.02 0.00 - 0.05 10*3/mm3    nRBC 0.0 0.0 - 0.2 /100 WBC   Type & Screen    Collection Time: 08/12/22  5:16 PM    Specimen: Blood   Result Value Ref Range    ABO Type A     RH type Positive     Antibody Screen Negative     T&S Expiration Date 8/15/2022 11:59:59 PM    ECG 12 Lead    Collection Time: 08/12/22  5:18 PM   Result Value Ref Range    QT Interval 383 ms   COVID-19,[facility] CEPHEID/VERONIKA NP SWAB IN TRANSPORT MEDIA 8-12 HR TAT - Swab, Nasopharynx    Collection Time: 08/12/22  6:39 PM    Specimen: Nasopharynx; Swab   Result Value Ref Range    COVID19 Not Detected Not Detected - Ref. Range   Basic Metabolic Panel    Collection Time: 08/13/22  4:24 AM    Specimen: Blood   Result Value Ref Range    Glucose 95 65 - 99 mg/dL    BUN 12 6 - 20 mg/dL    Creatinine 0.56 (L) 0.76 - 1.27 mg/dL    Sodium 143 136 - 145 mmol/L    Potassium 3.8 3.5 - 5.2 mmol/L    Chloride 106 98 - 107 mmol/L    CO2 29.0 22.0 - 29.0 mmol/L    Calcium 8.4 (L) 8.6 - 10.5 mg/dL    BUN/Creatinine Ratio 21.4 7.0 - 25.0    Anion Gap 8.0 5.0 - 15.0 mmol/L    eGFR 115.0 >60.0 mL/min/1.73   CBC (No Diff)    Collection Time: 08/13/22  4:24 AM    Specimen: Blood  "  Result Value Ref Range    WBC 5.39 3.40 - 10.80 10*3/mm3    RBC 3.13 (L) 4.14 - 5.80 10*6/mm3    Hemoglobin 9.6 (L) 13.0 - 17.7 g/dL    Hematocrit 29.8 (L) 37.5 - 51.0 %    MCV 95.2 79.0 - 97.0 fL    MCH 30.7 26.6 - 33.0 pg    MCHC 32.2 31.5 - 35.7 g/dL    RDW 12.4 12.3 - 15.4 %    RDW-SD 42.7 37.0 - 54.0 fl    MPV 9.2 6.0 - 12.0 fL    Platelets 313 140 - 450 10*3/mm3       IMPRESSION:  Patient is a 57 y.o. Not  or  male with left open patellar fracture with nonunion and possible concomitant patellar tendon disruption    PLAN:   · Admited to: Michela Peralta MD  · Disposition: Unfortunately this is a very bad situation with a poor prognosis for the function of the knee.  I am going to get an MRI of the knee to better assess the injury and look at the patella tendon.  He will require at least some sort of surgical intervention for proper wound closure and irrigation and debridement.  I am unsure what kind of reconstruction will be possible.    R \"Huang\" Carolina WHEATLEY MD  Orthopaedic Surgery  [facility] [place]  [phone] - [place] Office  [phone] - [place] Office            "

## 2022-08-13 NOTE — PROGRESS NOTES
"DAILY PROGRESS NOTE  [facility]    Patient Identification:  Name: Lucien Morrison  Age: 57 y.o.  Sex: male  :  1965  MRN: [id number]         Primary Care Physician: Ran Bragg MD    Subjective:  Interval History: He complains of left knee pain.    Objective:    Scheduled Meds:amLODIPine, 5 mg, Oral, Daily  aspirin EC, 325 mg, Oral, Daily  cefepime, 1 g, Intravenous, Q8H  clonazePAM, 1 mg, Oral, BID  docusate sodium, 100 mg, Oral, BID  escitalopram, 20 mg, Oral, Daily  hydroCHLOROthiazide, 12.5 mg, Oral, Daily  levothyroxine, 112 mcg, Oral, QAM  oxybutynin XL, 5 mg, Oral, BID  paliperidone, 12 mg, Oral, Daily  tamsulosin, 0.4 mg, Oral, Daily  zinc oxide, , Topical, BID      Continuous Infusions:sodium chloride, 75 mL/hr, Last Rate: 75 mL/hr (22 2202)        Vital signs in last 24 hours:  Temp:  [96.7 °F (35.9 °C)-98 °F (36.7 °C)] 97 °F (36.1 °C)  Heart Rate:  [74-91] 82  Resp:  [16] 16  BP: (113-147)/(69-95) 140/89    Intake/Output:  No intake or output data in the 24 hours ending 22 1220    Exam:  /89 (BP Location: Right arm, Patient Position: Lying)   Pulse 82   Temp 97 °F (36.1 °C) (Oral)   Resp 16   Ht 172.7 cm (68\")   Wt 76.5 kg (168 lb 10.4 oz)   SpO2 92%   BMI 25.64 kg/m²     General Appearance:    Alert, cooperative, no distress   Head:    Normocephalic, without obvious abnormality, atraumatic   Eyes:       Throat:   Lips, tongue, gums normal   Neck:   Supple, symmetrical, trachea midline, no JVD   Lungs:     Clear to auscultation bilaterally, respirations unlabored   Chest Wall:    No tenderness or deformity    Heart:    Regular rate and rhythm, S1 and S2 normal, no murmur,no  Rub or gallop   Abdomen:     Soft, nontender, bowel sounds active, no masses, no organomegaly    Extremities:   Extremities normal, atraumatic, no cyanosis or edema   Pulses:      Skin:   Skin is warm and dry,  no rashes or palpable lesions   Neurologic:  Mental deficiency      Lab " Results (last 72 hours)     Procedure Component Value Units Date/Time    C-reactive Protein [[id number]]  (Abnormal) Collected: 08/13/22 1008    Specimen: Blood Updated: 08/13/22 1132     C-Reactive Protein 1.68 mg/dL     Sedimentation Rate [[id number]]  (Abnormal) Collected: 08/13/22 1008    Specimen: Blood Updated: 08/13/22 1124     Sed Rate 49 mm/hr     Basic Metabolic Panel [[id number]]  (Abnormal) Collected: 08/13/22 0424    Specimen: Blood Updated: 08/13/22 0527     Glucose 95 mg/dL      BUN 12 mg/dL      Creatinine 0.56 mg/dL      Sodium 143 mmol/L      Potassium 3.8 mmol/L      Chloride 106 mmol/L      CO2 29.0 mmol/L      Calcium 8.4 mg/dL      BUN/Creatinine Ratio 21.4     Anion Gap 8.0 mmol/L      eGFR 115.0 mL/min/1.73      Comment: National Kidney Foundation and American Society of Nephrology (ASN) Task Force recommended calculation based on the Chronic Kidney Disease Epidemiology Collaboration (CKD-EPI) equation refit without adjustment for race.       Narrative:      GFR Normal >60  Chronic Kidney Disease <60  Kidney Failure <15      CBC (No Diff) [[id number]]  (Abnormal) Collected: 08/13/22 0424    Specimen: Blood Updated: 08/13/22 0455     WBC 5.39 10*3/mm3      RBC 3.13 10*6/mm3      Hemoglobin 9.6 g/dL      Hematocrit 29.8 %      MCV 95.2 fL      MCH 30.7 pg      MCHC 32.2 g/dL      RDW 12.4 %      RDW-SD 42.7 fl      MPV 9.2 fL      Platelets 313 10*3/mm3     COVID PRE-OP / PRE-PROCEDURE SCREENING ORDER (NO ISOLATION) - Swab, Nasopharynx [[id number]]  (Normal) Collected: 08/12/22 1839    Specimen: Swab from Nasopharynx Updated: 08/12/22 1924    Narrative:      The following orders were created for panel order COVID PRE-OP / PRE-PROCEDURE SCREENING ORDER (NO ISOLATION) - Swab, Nasopharynx.  Procedure                               Abnormality         Status                     ---------                               -----------         ------                     COVID-19, [facility]...[[id number]]   Normal              Final result                 Please view results for these tests on the individual orders.    COVID-19,[facility] CEPHEID/VERONIKA NP SWAB IN TRANSPORT MEDIA 8-12 HR TAT - Swab, Nasopharynx [[id number]]  (Normal) Collected: 08/12/22 1839    Specimen: Swab from Nasopharynx Updated: 08/12/22 1924     COVID19 Not Detected    Narrative:      Fact sheet for providers: https://www.fda.gov/media/901055/download     Fact sheet for patients: https://www.fda.gov/media/266133/download    Comprehensive Metabolic Panel [[id number]]  (Abnormal) Collected: 08/12/22 1716    Specimen: Blood Updated: 08/12/22 1815     Glucose 112 mg/dL      BUN 15 mg/dL      Creatinine 0.75 mg/dL      Sodium 139 mmol/L      Potassium 4.0 mmol/L      Chloride 100 mmol/L      CO2 31.4 mmol/L      Calcium 8.6 mg/dL      Total Protein 6.7 g/dL      Albumin 3.20 g/dL      ALT (SGPT) 13 U/L      AST (SGOT) 15 U/L      Alkaline Phosphatase 65 U/L      Total Bilirubin <0.2 mg/dL      Globulin 3.5 gm/dL      A/G Ratio 0.9 g/dL      BUN/Creatinine Ratio 20.0     Anion Gap 7.6 mmol/L      eGFR 105.3 mL/min/1.73      Comment: National Kidney Foundation and American Society of Nephrology (ASN) Task Force recommended calculation based on the Chronic Kidney Disease Epidemiology Collaboration (CKD-EPI) equation refit without adjustment for race.       Narrative:      GFR Normal >60  Chronic Kidney Disease <60  Kidney Failure <15      CBC & Differential [[id number]]  (Abnormal) Collected: 08/12/22 1716    Specimen: Blood Updated: 08/12/22 1731    Narrative:      The following orders were created for panel order CBC & Differential.  Procedure                               Abnormality         Status                     ---------                               -----------         ------                     CBC Auto Differential[[id number]]        Abnormal            Final result                 Please view results for these tests on the individual  orders.    CBC Auto Differential [[id number]]  (Abnormal) Collected: 08/12/22 1716    Specimen: Blood Updated: 08/12/22 1731     WBC 6.03 10*3/mm3      RBC 3.23 10*6/mm3      Hemoglobin 10.1 g/dL      Hematocrit 30.1 %      MCV 93.2 fL      MCH 31.3 pg      MCHC 33.6 g/dL      RDW 12.5 %      RDW-SD 42.3 fl      MPV 9.0 fL      Platelets 339 10*3/mm3      Neutrophil % 59.7 %      Lymphocyte % 21.9 %      Monocyte % 11.9 %      Eosinophil % 5.5 %      Basophil % 0.7 %      Immature Grans % 0.3 %      Neutrophils, Absolute 3.60 10*3/mm3      Lymphocytes, Absolute 1.32 10*3/mm3      Monocytes, Absolute 0.72 10*3/mm3      Eosinophils, Absolute 0.33 10*3/mm3      Basophils, Absolute 0.04 10*3/mm3      Immature Grans, Absolute 0.02 10*3/mm3      nRBC 0.0 /100 WBC         Data Review:  Results from last 7 days   Lab Units 08/13/22  0424 08/12/22  1716   SODIUM mmol/L 143 139   POTASSIUM mmol/L 3.8 4.0   CHLORIDE mmol/L 106 100   CO2 mmol/L 29.0 31.4*   BUN mg/dL 12 15   CREATININE mg/dL 0.56* 0.75*   GLUCOSE mg/dL 95 112*   CALCIUM mg/dL 8.4* 8.6     Results from last 7 days   Lab Units 08/13/22  0424 08/12/22  1716   WBC 10*3/mm3 5.39 6.03   HEMOGLOBIN g/dL 9.6* 10.1*   HEMATOCRIT % 29.8* 30.1*   PLATELETS 10*3/mm3 313 339             No results found for: TROPONINT      Results from last 7 days   Lab Units 08/12/22  1716   ALK PHOS U/L 65   BILIRUBIN mg/dL <0.2   ALT (SGPT) U/L 13   AST (SGOT) U/L 15             No results found for: POCGLU        Past Medical History:   Diagnosis Date   • Anemia    • Anxiety    • Cataract     bilateral   • Colitis    • Depression    • Disease of thyroid gland    • Diverticulitis    • Hemorrhoids    • History of BPH    • Hydrocele in adult    • Hyperopia    • Hypertension    • Impulse control disorder    • Seasonal allergies    • Shingles        Assessment:  Active Hospital Problems    Diagnosis  POA   • **Closed displaced transverse fracture of left patella [S82.032A]  Yes   • Anxiety  [F41.9]  Unknown   • Depression [F32.A]  Unknown   • Disease of thyroid gland [E07.9]  Unknown   • Hypertension [I10]  Unknown   • History of BPH [Z87.438]  Unknown   • Mental deficiency [F79]  Unknown      Resolved Hospital Problems   No resolved problems to display.       Plan:  Ortho consult noted.  Plans for MRI of the left knee.  Continue with antibiotics for possible infection.  We will get follow-up lab studies.    Jerzy Emerson MD  8/13/2022  12:20 EDT

## 2022-08-13 NOTE — PLAN OF CARE
Goal Outcome Evaluation:   Patient admitted via the ED with L patella fracture. VSS, RA. A&O x2,cognitive delay noted. Difficult to fully complete admission due to no family being at bedside. Patient NPO since MN per MD order. Turns offered.Pain controlled at this time. Skin issues noted in assessment, wound consult placed. Awaiting ortho planning and surgery decision. WCTM.

## 2022-08-14 LAB
ANION GAP SERPL CALCULATED.3IONS-SCNC: 7 MMOL/L (ref 5–15)
BASOPHILS # BLD AUTO: 0.04 10*3/MM3 (ref 0–0.2)
BASOPHILS NFR BLD AUTO: 0.8 % (ref 0–1.5)
BUN SERPL-MCNC: 12 MG/DL (ref 6–20)
BUN/CREAT SERPL: 20.7 (ref 7–25)
CALCIUM SPEC-SCNC: 8.3 MG/DL (ref 8.6–10.5)
CHLORIDE SERPL-SCNC: 105 MMOL/L (ref 98–107)
CO2 SERPL-SCNC: 30 MMOL/L (ref 22–29)
CREAT SERPL-MCNC: 0.58 MG/DL (ref 0.76–1.27)
DEPRECATED RDW RBC AUTO: 43.6 FL (ref 37–54)
EGFRCR SERPLBLD CKD-EPI 2021: 113.8 ML/MIN/1.73
EOSINOPHIL # BLD AUTO: 0.3 10*3/MM3 (ref 0–0.4)
EOSINOPHIL NFR BLD AUTO: 5.9 % (ref 0.3–6.2)
ERYTHROCYTE [DISTWIDTH] IN BLOOD BY AUTOMATED COUNT: 12.6 % (ref 12.3–15.4)
GLUCOSE SERPL-MCNC: 87 MG/DL (ref 65–99)
HCT VFR BLD AUTO: 29.2 % (ref 37.5–51)
HGB BLD-MCNC: 9.5 G/DL (ref 13–17.7)
IMM GRANULOCYTES # BLD AUTO: 0.01 10*3/MM3 (ref 0–0.05)
IMM GRANULOCYTES NFR BLD AUTO: 0.2 % (ref 0–0.5)
LYMPHOCYTES # BLD AUTO: 1.48 10*3/MM3 (ref 0.7–3.1)
LYMPHOCYTES NFR BLD AUTO: 29.3 % (ref 19.6–45.3)
MCH RBC QN AUTO: 31 PG (ref 26.6–33)
MCHC RBC AUTO-ENTMCNC: 32.5 G/DL (ref 31.5–35.7)
MCV RBC AUTO: 95.4 FL (ref 79–97)
MONOCYTES # BLD AUTO: 0.49 10*3/MM3 (ref 0.1–0.9)
MONOCYTES NFR BLD AUTO: 9.7 % (ref 5–12)
NEUTROPHILS NFR BLD AUTO: 2.73 10*3/MM3 (ref 1.7–7)
NEUTROPHILS NFR BLD AUTO: 54.1 % (ref 42.7–76)
NRBC BLD AUTO-RTO: 0 /100 WBC (ref 0–0.2)
PLATELET # BLD AUTO: 301 10*3/MM3 (ref 140–450)
PMV BLD AUTO: 9.3 FL (ref 6–12)
POTASSIUM SERPL-SCNC: 3.7 MMOL/L (ref 3.5–5.2)
RBC # BLD AUTO: 3.06 10*6/MM3 (ref 4.14–5.8)
SODIUM SERPL-SCNC: 142 MMOL/L (ref 136–145)
WBC NRBC COR # BLD: 5.05 10*3/MM3 (ref 3.4–10.8)

## 2022-08-14 PROCEDURE — 80048 BASIC METABOLIC PNL TOTAL CA: CPT | Performed by: HOSPITALIST

## 2022-08-14 PROCEDURE — 85025 COMPLETE CBC W/AUTO DIFF WBC: CPT | Performed by: HOSPITALIST

## 2022-08-14 PROCEDURE — 25010000002 CEFEPIME PER 500 MG: Performed by: INTERNAL MEDICINE

## 2022-08-14 RX ADMIN — OXYBUTYNIN CHLORIDE 5 MG: 5 TABLET, EXTENDED RELEASE ORAL at 21:15

## 2022-08-14 RX ADMIN — PALIPERIDONE 12 MG: 6 TABLET, EXTENDED RELEASE ORAL at 08:20

## 2022-08-14 RX ADMIN — CEFEPIME HYDROCHLORIDE 1 G: 1 INJECTION, POWDER, FOR SOLUTION INTRAMUSCULAR; INTRAVENOUS at 23:31

## 2022-08-14 RX ADMIN — CEFEPIME HYDROCHLORIDE 1 G: 1 INJECTION, POWDER, FOR SOLUTION INTRAMUSCULAR; INTRAVENOUS at 09:57

## 2022-08-14 RX ADMIN — HYDROCHLOROTHIAZIDE 12.5 MG: 12.5 TABLET ORAL at 08:19

## 2022-08-14 RX ADMIN — DOCUSATE SODIUM 100 MG: 100 CAPSULE, LIQUID FILLED ORAL at 21:14

## 2022-08-14 RX ADMIN — LEVOTHYROXINE SODIUM 112 MCG: 0.11 TABLET ORAL at 06:46

## 2022-08-14 RX ADMIN — OXYBUTYNIN CHLORIDE 5 MG: 5 TABLET, EXTENDED RELEASE ORAL at 08:19

## 2022-08-14 RX ADMIN — ESCITALOPRAM 20 MG: 20 TABLET, FILM COATED ORAL at 13:11

## 2022-08-14 RX ADMIN — Medication: at 08:10

## 2022-08-14 RX ADMIN — Medication: at 21:14

## 2022-08-14 RX ADMIN — AMLODIPINE BESYLATE 5 MG: 5 TABLET ORAL at 08:19

## 2022-08-14 RX ADMIN — DOCUSATE SODIUM 100 MG: 100 CAPSULE, LIQUID FILLED ORAL at 08:20

## 2022-08-14 RX ADMIN — TAMSULOSIN HYDROCHLORIDE 0.4 MG: 0.4 CAPSULE ORAL at 08:20

## 2022-08-14 RX ADMIN — CEFEPIME HYDROCHLORIDE 1 G: 1 INJECTION, POWDER, FOR SOLUTION INTRAMUSCULAR; INTRAVENOUS at 16:09

## 2022-08-14 RX ADMIN — CLONAZEPAM 1 MG: 1 TABLET ORAL at 08:19

## 2022-08-14 RX ADMIN — CLONAZEPAM 1 MG: 1 TABLET ORAL at 21:14

## 2022-08-14 NOTE — PROGRESS NOTES
"Patient's MRI from yesterday demonstrates complete patellar tendon repair and partial quadricep tendon tear along with displacement of his patella fracture which had previously been repaired.  Plan for surgery tomorrow.  I am going to have a discussion with the POA today about the plan    R \"Huang\" Carolina WHEATLEY MD  Orthopaedic Surgery  [facility]  [phone] - [place] [facility]  [phone] - [place] [facility]    "

## 2022-08-14 NOTE — PLAN OF CARE
Goal Outcome Evaluation:  Plan of Care Reviewed With: patient        Progress: no change     Problem: Adult Inpatient Plan of Care  Goal: Plan of Care Review  8/14/2022 0137 by Violeta Shea, RN  Outcome: Unable to Meet, Plan Revised  8/14/2022 0131 by Violeta Shea, RN  Outcome: Ongoing, Progressing   Pt admitted on 8/12 with left knee pain. Pt has a cognitive delay and was not able to follow the knee restrictions at rehab as far as bearing weight. Pt now has a left closed patella fracture. Pt had his knee originally done in June. Pt is possibly going to surgery on Monday. Pt has a chronic infection to the left knee. Currently on IV antibiotics through a midline to the LUE. Tolerating well. Q2 turns. Pt wears a brief and Pure Wick for incontinence. Pt has minimal complaints of pain. Pt resting at this time, will continue to monitor.

## 2022-08-14 NOTE — PROGRESS NOTES
"DAILY PROGRESS NOTE  [facility]    Patient Identification:  Name: Lucien Morrison  Age: 57 y.o.  Sex: male  :  1965  MRN: [id number]         Primary Care Physician: Ran Bragg MD    Subjective:  Interval History: He complains of left knee pain.    Objective:    Scheduled Meds:amLODIPine, 5 mg, Oral, Daily  aspirin EC, 325 mg, Oral, Daily  cefepime, 1 g, Intravenous, Q8H  clonazePAM, 1 mg, Oral, BID  docusate sodium, 100 mg, Oral, BID  escitalopram, 20 mg, Oral, Daily  hydroCHLOROthiazide, 12.5 mg, Oral, Daily  levothyroxine, 112 mcg, Oral, QAM  oxybutynin XL, 5 mg, Oral, BID  paliperidone, 12 mg, Oral, Daily  tamsulosin, 0.4 mg, Oral, Daily  zinc oxide, , Topical, BID      Continuous Infusions:sodium chloride, 75 mL/hr, Last Rate: 75 mL/hr (22 2202)        Vital signs in last 24 hours:  Temp:  [96.9 °F (36.1 °C)-97.3 °F (36.3 °C)] 97.3 °F (36.3 °C)  Heart Rate:  [74-95] 83  Resp:  [16] 16  BP: (107-149)/(61-81) 149/81    Intake/Output:    Intake/Output Summary (Last 24 hours) at 2022 1149  Last data filed at 2022 1013  Gross per 24 hour   Intake 2020 ml   Output 400 ml   Net 1620 ml       Exam:  /81 (BP Location: Right arm, Patient Position: Lying)   Pulse 83   Temp 97.3 °F (36.3 °C) (Oral)   Resp 16   Ht 172.7 cm (68\")   Wt 76.5 kg (168 lb 10.4 oz)   SpO2 91%   BMI 25.64 kg/m²     General Appearance:    Alert, cooperative, no distress   Head:    Normocephalic, without obvious abnormality, atraumatic   Eyes:       Throat:   Lips, tongue, gums normal   Neck:   Supple, symmetrical, trachea midline, no JVD   Lungs:     Clear to auscultation bilaterally, respirations unlabored   Chest Wall:    No tenderness or deformity    Heart:    Regular rate and rhythm, S1 and S2 normal, no murmur,no  Rub or gallop   Abdomen:     Soft, nontender, bowel sounds active, no masses, no organomegaly    Extremities:   Extremities normal, atraumatic, no cyanosis or edema   Pulses:    "   Skin:   Skin is warm and dry,  no rashes or palpable lesions   Neurologic:  Mental deficiency      Lab Results (last 72 hours)     Procedure Component Value Units Date/Time    C-reactive Protein [[id number]]  (Abnormal) Collected: 08/13/22 1008    Specimen: Blood Updated: 08/13/22 1132     C-Reactive Protein 1.68 mg/dL     Sedimentation Rate [[id number]]  (Abnormal) Collected: 08/13/22 1008    Specimen: Blood Updated: 08/13/22 1124     Sed Rate 49 mm/hr     Basic Metabolic Panel [[id number]]  (Abnormal) Collected: 08/13/22 0424    Specimen: Blood Updated: 08/13/22 0527     Glucose 95 mg/dL      BUN 12 mg/dL      Creatinine 0.56 mg/dL      Sodium 143 mmol/L      Potassium 3.8 mmol/L      Chloride 106 mmol/L      CO2 29.0 mmol/L      Calcium 8.4 mg/dL      BUN/Creatinine Ratio 21.4     Anion Gap 8.0 mmol/L      eGFR 115.0 mL/min/1.73      Comment: National Kidney Foundation and American Society of Nephrology (ASN) Task Force recommended calculation based on the Chronic Kidney Disease Epidemiology Collaboration (CKD-EPI) equation refit without adjustment for race.       Narrative:      GFR Normal >60  Chronic Kidney Disease <60  Kidney Failure <15      CBC (No Diff) [[id number]]  (Abnormal) Collected: 08/13/22 0424    Specimen: Blood Updated: 08/13/22 0455     WBC 5.39 10*3/mm3      RBC 3.13 10*6/mm3      Hemoglobin 9.6 g/dL      Hematocrit 29.8 %      MCV 95.2 fL      MCH 30.7 pg      MCHC 32.2 g/dL      RDW 12.4 %      RDW-SD 42.7 fl      MPV 9.2 fL      Platelets 313 10*3/mm3     COVID PRE-OP / PRE-PROCEDURE SCREENING ORDER (NO ISOLATION) - Swab, Nasopharynx [[id number]]  (Normal) Collected: 08/12/22 1839    Specimen: Swab from Nasopharynx Updated: 08/12/22 1924    Narrative:      The following orders were created for panel order COVID PRE-OP / PRE-PROCEDURE SCREENING ORDER (NO ISOLATION) - Swab, Nasopharynx.  Procedure                               Abnormality         Status                     ---------                                -----------         ------                     COVID-19,[facility]...[[id number]]  Normal              Final result                 Please view results for these tests on the individual orders.    COVID-19,[facility] CEPHEID/VERONIKA NP SWAB IN TRANSPORT MEDIA 8-12 HR TAT - Swab, Nasopharynx [[id number]]  (Normal) Collected: 08/12/22 1839    Specimen: Swab from Nasopharynx Updated: 08/12/22 1924     COVID19 Not Detected    Narrative:      Fact sheet for providers: https://www.fda.gov/media/921806/download     Fact sheet for patients: https://www.fda.gov/media/833935/download    Comprehensive Metabolic Panel [[id number]]  (Abnormal) Collected: 08/12/22 1716    Specimen: Blood Updated: 08/12/22 1815     Glucose 112 mg/dL      BUN 15 mg/dL      Creatinine 0.75 mg/dL      Sodium 139 mmol/L      Potassium 4.0 mmol/L      Chloride 100 mmol/L      CO2 31.4 mmol/L      Calcium 8.6 mg/dL      Total Protein 6.7 g/dL      Albumin 3.20 g/dL      ALT (SGPT) 13 U/L      AST (SGOT) 15 U/L      Alkaline Phosphatase 65 U/L      Total Bilirubin <0.2 mg/dL      Globulin 3.5 gm/dL      A/G Ratio 0.9 g/dL      BUN/Creatinine Ratio 20.0     Anion Gap 7.6 mmol/L      eGFR 105.3 mL/min/1.73      Comment: National Kidney Foundation and American Society of Nephrology (ASN) Task Force recommended calculation based on the Chronic Kidney Disease Epidemiology Collaboration (CKD-EPI) equation refit without adjustment for race.       Narrative:      GFR Normal >60  Chronic Kidney Disease <60  Kidney Failure <15      CBC & Differential [[id number]]  (Abnormal) Collected: 08/12/22 1716    Specimen: Blood Updated: 08/12/22 1731    Narrative:      The following orders were created for panel order CBC & Differential.  Procedure                               Abnormality         Status                     ---------                               -----------         ------                     CBC Auto Differential[[id number]]         Abnormal            Final result                 Please view results for these tests on the individual orders.    CBC Auto Differential [[id number]]  (Abnormal) Collected: 08/12/22 1716    Specimen: Blood Updated: 08/12/22 1731     WBC 6.03 10*3/mm3      RBC 3.23 10*6/mm3      Hemoglobin 10.1 g/dL      Hematocrit 30.1 %      MCV 93.2 fL      MCH 31.3 pg      MCHC 33.6 g/dL      RDW 12.5 %      RDW-SD 42.3 fl      MPV 9.0 fL      Platelets 339 10*3/mm3      Neutrophil % 59.7 %      Lymphocyte % 21.9 %      Monocyte % 11.9 %      Eosinophil % 5.5 %      Basophil % 0.7 %      Immature Grans % 0.3 %      Neutrophils, Absolute 3.60 10*3/mm3      Lymphocytes, Absolute 1.32 10*3/mm3      Monocytes, Absolute 0.72 10*3/mm3      Eosinophils, Absolute 0.33 10*3/mm3      Basophils, Absolute 0.04 10*3/mm3      Immature Grans, Absolute 0.02 10*3/mm3      nRBC 0.0 /100 WBC         Data Review:  Results from last 7 days   Lab Units 08/14/22  0505 08/13/22  0424 08/12/22  1716   SODIUM mmol/L 142 143 139   POTASSIUM mmol/L 3.7 3.8 4.0   CHLORIDE mmol/L 105 106 100   CO2 mmol/L 30.0* 29.0 31.4*   BUN mg/dL 12 12 15   CREATININE mg/dL 0.58* 0.56* 0.75*   GLUCOSE mg/dL 87 95 112*   CALCIUM mg/dL 8.3* 8.4* 8.6     Results from last 7 days   Lab Units 08/14/22  0505 08/13/22  0424 08/12/22  1716   WBC 10*3/mm3 5.05 5.39 6.03   HEMOGLOBIN g/dL 9.5* 9.6* 10.1*   HEMATOCRIT % 29.2* 29.8* 30.1*   PLATELETS 10*3/mm3 301 313 339             No results found for: TROPONINT      Results from last 7 days   Lab Units 08/12/22  1716   ALK PHOS U/L 65   BILIRUBIN mg/dL <0.2   ALT (SGPT) U/L 13   AST (SGOT) U/L 15             No results found for: POCGLU        Past Medical History:   Diagnosis Date   • Anemia    • Anxiety    • Cataract     bilateral   • Colitis    • Depression    • Disease of thyroid gland    • Diverticulitis    • Hemorrhoids    • History of BPH    • Hydrocele in adult    • Hyperopia    • Hypertension    • Impulse control disorder     • Seasonal allergies    • Shingles        Assessment:  Active Hospital Problems    Diagnosis  POA   • **Closed displaced transverse fracture of left patella [S82.032A]  Yes   • Anxiety [F41.9]  Unknown   • Depression [F32.A]  Unknown   • Disease of thyroid gland [E07.9]  Unknown   • Hypertension [I10]  Unknown   • History of BPH [Z87.438]  Unknown   • Mental deficiency [F79]  Unknown      Resolved Hospital Problems   No resolved problems to display.       Plan:  Ortho consult noted.   MRI of the left knee report noted.  Continue with antibiotics for possible infection.  We will get follow-up lab studies.    Jerzy Emerson MD  8/14/2022  11:49 EDT

## 2022-08-14 NOTE — PLAN OF CARE
Goal Outcome Evaluation:  Plan of Care Reviewed With: patient, sibling        Progress: no change  Outcome Evaluation: Pt 57/M alert to self admitted with left knee pain. closed patella fracture.  History of infection. Midline in place. Pt currently resides at [facility].  IV abx given. Plan is for surgery on Monday no time yet. No complaints of pain. Q2 turns with accumax in place. SCDs on. Incontinent, purewick and brief in place. Small bowel movement today. Pressure injury on coccyx when admitted. Mepilex in place. Will continue to monitor. Unable to educate

## 2022-08-15 ENCOUNTER — ANESTHESIA (OUTPATIENT)
Dept: PERIOP | Facility: HOSPITAL | Age: 57
End: 2022-08-15

## 2022-08-15 ENCOUNTER — ANESTHESIA EVENT (OUTPATIENT)
Dept: PERIOP | Facility: HOSPITAL | Age: 57
End: 2022-08-15

## 2022-08-15 LAB
ANION GAP SERPL CALCULATED.3IONS-SCNC: 9.1 MMOL/L (ref 5–15)
BASOPHILS # BLD AUTO: 0.04 10*3/MM3 (ref 0–0.2)
BASOPHILS NFR BLD AUTO: 0.6 % (ref 0–1.5)
BUN SERPL-MCNC: 14 MG/DL (ref 6–20)
BUN/CREAT SERPL: 22.6 (ref 7–25)
CALCIUM SPEC-SCNC: 8.3 MG/DL (ref 8.6–10.5)
CHLORIDE SERPL-SCNC: 107 MMOL/L (ref 98–107)
CO2 SERPL-SCNC: 28.9 MMOL/L (ref 22–29)
CREAT SERPL-MCNC: 0.62 MG/DL (ref 0.76–1.27)
DEPRECATED RDW RBC AUTO: 43.1 FL (ref 37–54)
EGFRCR SERPLBLD CKD-EPI 2021: 111.5 ML/MIN/1.73
EOSINOPHIL # BLD AUTO: 0.31 10*3/MM3 (ref 0–0.4)
EOSINOPHIL NFR BLD AUTO: 5 % (ref 0.3–6.2)
ERYTHROCYTE [DISTWIDTH] IN BLOOD BY AUTOMATED COUNT: 12.5 % (ref 12.3–15.4)
GLUCOSE SERPL-MCNC: 95 MG/DL (ref 65–99)
HCT VFR BLD AUTO: 30.1 % (ref 37.5–51)
HGB BLD-MCNC: 9.5 G/DL (ref 13–17.7)
IMM GRANULOCYTES # BLD AUTO: 0.01 10*3/MM3 (ref 0–0.05)
IMM GRANULOCYTES NFR BLD AUTO: 0.2 % (ref 0–0.5)
LYMPHOCYTES # BLD AUTO: 1.79 10*3/MM3 (ref 0.7–3.1)
LYMPHOCYTES NFR BLD AUTO: 28.6 % (ref 19.6–45.3)
MCH RBC QN AUTO: 30.4 PG (ref 26.6–33)
MCHC RBC AUTO-ENTMCNC: 31.6 G/DL (ref 31.5–35.7)
MCV RBC AUTO: 96.2 FL (ref 79–97)
MONOCYTES # BLD AUTO: 0.62 10*3/MM3 (ref 0.1–0.9)
MONOCYTES NFR BLD AUTO: 9.9 % (ref 5–12)
NEUTROPHILS NFR BLD AUTO: 3.48 10*3/MM3 (ref 1.7–7)
NEUTROPHILS NFR BLD AUTO: 55.7 % (ref 42.7–76)
NRBC BLD AUTO-RTO: 0 /100 WBC (ref 0–0.2)
PLATELET # BLD AUTO: 271 10*3/MM3 (ref 140–450)
PMV BLD AUTO: 9.6 FL (ref 6–12)
POTASSIUM SERPL-SCNC: 3.8 MMOL/L (ref 3.5–5.2)
RBC # BLD AUTO: 3.13 10*6/MM3 (ref 4.14–5.8)
SARS-COV-2 RNA RESP QL NAA+PROBE: NOT DETECTED
SODIUM SERPL-SCNC: 145 MMOL/L (ref 136–145)
WBC NRBC COR # BLD: 6.25 10*3/MM3 (ref 3.4–10.8)

## 2022-08-15 PROCEDURE — 87102 FUNGUS ISOLATION CULTURE: CPT | Performed by: HOSPITALIST

## 2022-08-15 PROCEDURE — U0005 INFEC AGEN DETEC AMPLI PROBE: HCPCS | Performed by: ORTHOPAEDIC SURGERY

## 2022-08-15 PROCEDURE — 25010000002 FENTANYL CITRATE (PF) 50 MCG/ML SOLUTION: Performed by: NURSE ANESTHETIST, CERTIFIED REGISTERED

## 2022-08-15 PROCEDURE — 25010000002 CEFEPIME PER 500 MG: Performed by: INTERNAL MEDICINE

## 2022-08-15 PROCEDURE — 25010000002 PHENYLEPHRINE 10 MG/ML SOLUTION: Performed by: NURSE ANESTHETIST, CERTIFIED REGISTERED

## 2022-08-15 PROCEDURE — C1713 ANCHOR/SCREW BN/BN,TIS/BN: HCPCS | Performed by: ORTHOPAEDIC SURGERY

## 2022-08-15 PROCEDURE — U0003 INFECTIOUS AGENT DETECTION BY NUCLEIC ACID (DNA OR RNA); SEVERE ACUTE RESPIRATORY SYNDROME CORONAVIRUS 2 (SARS-COV-2) (CORONAVIRUS DISEASE [COVID-19]), AMPLIFIED PROBE TECHNIQUE, MAKING USE OF HIGH THROUGHPUT TECHNOLOGIES AS DESCRIBED BY CMS-2020-01-R: HCPCS | Performed by: ORTHOPAEDIC SURGERY

## 2022-08-15 PROCEDURE — 25010000002 DEXAMETHASONE PER 1 MG: Performed by: NURSE ANESTHETIST, CERTIFIED REGISTERED

## 2022-08-15 PROCEDURE — 0LQR0ZZ REPAIR LEFT KNEE TENDON, OPEN APPROACH: ICD-10-PCS | Performed by: ORTHOPAEDIC SURGERY

## 2022-08-15 PROCEDURE — 85025 COMPLETE CBC W/AUTO DIFF WBC: CPT | Performed by: HOSPITALIST

## 2022-08-15 PROCEDURE — 0QSF04Z REPOSITION LEFT PATELLA WITH INTERNAL FIXATION DEVICE, OPEN APPROACH: ICD-10-PCS | Performed by: ORTHOPAEDIC SURGERY

## 2022-08-15 PROCEDURE — 25010000002 ONDANSETRON PER 1 MG: Performed by: NURSE ANESTHETIST, CERTIFIED REGISTERED

## 2022-08-15 PROCEDURE — 25010000002 CEFEPIME PER 500 MG: Performed by: ORTHOPAEDIC SURGERY

## 2022-08-15 PROCEDURE — 80048 BASIC METABOLIC PNL TOTAL CA: CPT | Performed by: HOSPITALIST

## 2022-08-15 PROCEDURE — 25010000002 PROPOFOL 10 MG/ML EMULSION: Performed by: NURSE ANESTHETIST, CERTIFIED REGISTERED

## 2022-08-15 PROCEDURE — L1833 KO ADJ JNT POS R SUP PRE OTS: HCPCS | Performed by: ORTHOPAEDIC SURGERY

## 2022-08-15 DEVICE — TIN 5MM X 30 MM SHORT HALF PIN
Type: IMPLANTABLE DEVICE | Site: KNEE | Status: FUNCTIONAL
Brand: JET-X
Removed: 2022-10-03

## 2022-08-15 DEVICE — FW BPB #5 SUTR,BLU W/NDL
Type: IMPLANTABLE DEVICE | Site: KNEE | Status: FUNCTIONAL
Brand: ARTHREX®
Removed: 2022-10-03

## 2022-08-15 DEVICE — JET-X TIN 6MMX50MM
Type: IMPLANTABLE DEVICE | Site: KNEE | Status: FUNCTIONAL
Brand: JET-X
Removed: 2022-10-03

## 2022-08-15 RX ORDER — ONDANSETRON 2 MG/ML
INJECTION INTRAMUSCULAR; INTRAVENOUS AS NEEDED
Status: DISCONTINUED | OUTPATIENT
Start: 2022-08-15 | End: 2022-08-15 | Stop reason: SURG

## 2022-08-15 RX ORDER — PHENYLEPHRINE HYDROCHLORIDE 10 MG/ML
INJECTION INTRAVENOUS AS NEEDED
Status: DISCONTINUED | OUTPATIENT
Start: 2022-08-15 | End: 2022-08-15 | Stop reason: SURG

## 2022-08-15 RX ORDER — OXYCODONE AND ACETAMINOPHEN 7.5; 325 MG/1; MG/1
1 TABLET ORAL EVERY 4 HOURS PRN
Status: DISCONTINUED | OUTPATIENT
Start: 2022-08-15 | End: 2022-08-15 | Stop reason: HOSPADM

## 2022-08-15 RX ORDER — IPRATROPIUM BROMIDE AND ALBUTEROL SULFATE 2.5; .5 MG/3ML; MG/3ML
3 SOLUTION RESPIRATORY (INHALATION) ONCE AS NEEDED
Status: DISCONTINUED | OUTPATIENT
Start: 2022-08-15 | End: 2022-08-15 | Stop reason: HOSPADM

## 2022-08-15 RX ORDER — NALOXONE HCL 0.4 MG/ML
0.2 VIAL (ML) INJECTION AS NEEDED
Status: DISCONTINUED | OUTPATIENT
Start: 2022-08-15 | End: 2022-08-15 | Stop reason: HOSPADM

## 2022-08-15 RX ORDER — FENTANYL CITRATE 50 UG/ML
INJECTION, SOLUTION INTRAMUSCULAR; INTRAVENOUS AS NEEDED
Status: DISCONTINUED | OUTPATIENT
Start: 2022-08-15 | End: 2022-08-15 | Stop reason: SURG

## 2022-08-15 RX ORDER — SODIUM CHLORIDE 0.9 % (FLUSH) 0.9 %
10 SYRINGE (ML) INJECTION AS NEEDED
Status: DISCONTINUED | OUTPATIENT
Start: 2022-08-15 | End: 2022-08-15 | Stop reason: HOSPADM

## 2022-08-15 RX ORDER — FENTANYL CITRATE 50 UG/ML
50 INJECTION, SOLUTION INTRAMUSCULAR; INTRAVENOUS
Status: DISCONTINUED | OUTPATIENT
Start: 2022-08-15 | End: 2022-08-15 | Stop reason: HOSPADM

## 2022-08-15 RX ORDER — SODIUM CHLORIDE 0.9 % (FLUSH) 0.9 %
10 SYRINGE (ML) INJECTION EVERY 12 HOURS SCHEDULED
Status: DISCONTINUED | OUTPATIENT
Start: 2022-08-15 | End: 2022-08-15 | Stop reason: HOSPADM

## 2022-08-15 RX ORDER — HYDRALAZINE HYDROCHLORIDE 20 MG/ML
5 INJECTION INTRAMUSCULAR; INTRAVENOUS
Status: DISCONTINUED | OUTPATIENT
Start: 2022-08-15 | End: 2022-08-15 | Stop reason: HOSPADM

## 2022-08-15 RX ORDER — BUPIVACAINE HYDROCHLORIDE AND EPINEPHRINE 5; 5 MG/ML; UG/ML
INJECTION, SOLUTION EPIDURAL; INTRACAUDAL; PERINEURAL AS NEEDED
Status: DISCONTINUED | OUTPATIENT
Start: 2022-08-15 | End: 2022-08-15 | Stop reason: HOSPADM

## 2022-08-15 RX ORDER — FLUMAZENIL 0.1 MG/ML
0.2 INJECTION INTRAVENOUS AS NEEDED
Status: DISCONTINUED | OUTPATIENT
Start: 2022-08-15 | End: 2022-08-15 | Stop reason: HOSPADM

## 2022-08-15 RX ORDER — DIPHENHYDRAMINE HCL 25 MG
25 CAPSULE ORAL
Status: DISCONTINUED | OUTPATIENT
Start: 2022-08-15 | End: 2022-08-15 | Stop reason: HOSPADM

## 2022-08-15 RX ORDER — LIDOCAINE HYDROCHLORIDE 10 MG/ML
0.5 INJECTION, SOLUTION EPIDURAL; INFILTRATION; INTRACAUDAL; PERINEURAL ONCE AS NEEDED
Status: DISCONTINUED | OUTPATIENT
Start: 2022-08-15 | End: 2022-08-15 | Stop reason: HOSPADM

## 2022-08-15 RX ORDER — HYDROCODONE BITARTRATE AND ACETAMINOPHEN 7.5; 325 MG/1; MG/1
1 TABLET ORAL ONCE AS NEEDED
Status: DISCONTINUED | OUTPATIENT
Start: 2022-08-15 | End: 2022-08-15 | Stop reason: HOSPADM

## 2022-08-15 RX ORDER — PROMETHAZINE HYDROCHLORIDE 25 MG/1
25 SUPPOSITORY RECTAL ONCE AS NEEDED
Status: DISCONTINUED | OUTPATIENT
Start: 2022-08-15 | End: 2022-08-15 | Stop reason: HOSPADM

## 2022-08-15 RX ORDER — DEXAMETHASONE SODIUM PHOSPHATE 10 MG/ML
INJECTION INTRAMUSCULAR; INTRAVENOUS AS NEEDED
Status: DISCONTINUED | OUTPATIENT
Start: 2022-08-15 | End: 2022-08-15 | Stop reason: SURG

## 2022-08-15 RX ORDER — ROCURONIUM BROMIDE 10 MG/ML
INJECTION, SOLUTION INTRAVENOUS AS NEEDED
Status: DISCONTINUED | OUTPATIENT
Start: 2022-08-15 | End: 2022-08-15 | Stop reason: SURG

## 2022-08-15 RX ORDER — LABETALOL HYDROCHLORIDE 5 MG/ML
5 INJECTION, SOLUTION INTRAVENOUS
Status: DISCONTINUED | OUTPATIENT
Start: 2022-08-15 | End: 2022-08-15 | Stop reason: HOSPADM

## 2022-08-15 RX ORDER — LIDOCAINE HYDROCHLORIDE 20 MG/ML
INJECTION, SOLUTION INFILTRATION; PERINEURAL AS NEEDED
Status: DISCONTINUED | OUTPATIENT
Start: 2022-08-15 | End: 2022-08-15 | Stop reason: SURG

## 2022-08-15 RX ORDER — PROMETHAZINE HYDROCHLORIDE 25 MG/1
25 TABLET ORAL ONCE AS NEEDED
Status: DISCONTINUED | OUTPATIENT
Start: 2022-08-15 | End: 2022-08-15 | Stop reason: HOSPADM

## 2022-08-15 RX ORDER — DIPHENHYDRAMINE HYDROCHLORIDE 50 MG/ML
12.5 INJECTION INTRAMUSCULAR; INTRAVENOUS
Status: DISCONTINUED | OUTPATIENT
Start: 2022-08-15 | End: 2022-08-15 | Stop reason: HOSPADM

## 2022-08-15 RX ORDER — SODIUM CHLORIDE, SODIUM LACTATE, POTASSIUM CHLORIDE, CALCIUM CHLORIDE 600; 310; 30; 20 MG/100ML; MG/100ML; MG/100ML; MG/100ML
9 INJECTION, SOLUTION INTRAVENOUS CONTINUOUS PRN
Status: DISCONTINUED | OUTPATIENT
Start: 2022-08-15 | End: 2022-08-23 | Stop reason: HOSPADM

## 2022-08-15 RX ORDER — ONDANSETRON 2 MG/ML
4 INJECTION INTRAMUSCULAR; INTRAVENOUS ONCE AS NEEDED
Status: DISCONTINUED | OUTPATIENT
Start: 2022-08-15 | End: 2022-08-15 | Stop reason: HOSPADM

## 2022-08-15 RX ORDER — MIDAZOLAM HYDROCHLORIDE 1 MG/ML
1 INJECTION INTRAMUSCULAR; INTRAVENOUS
Status: DISCONTINUED | OUTPATIENT
Start: 2022-08-15 | End: 2022-08-15 | Stop reason: HOSPADM

## 2022-08-15 RX ORDER — PROPOFOL 10 MG/ML
VIAL (ML) INTRAVENOUS AS NEEDED
Status: DISCONTINUED | OUTPATIENT
Start: 2022-08-15 | End: 2022-08-15 | Stop reason: SURG

## 2022-08-15 RX ORDER — IBUPROFEN 600 MG/1
600 TABLET ORAL ONCE AS NEEDED
Status: DISCONTINUED | OUTPATIENT
Start: 2022-08-15 | End: 2022-08-15 | Stop reason: HOSPADM

## 2022-08-15 RX ORDER — HYDROMORPHONE HYDROCHLORIDE 1 MG/ML
0.5 INJECTION, SOLUTION INTRAMUSCULAR; INTRAVENOUS; SUBCUTANEOUS
Status: DISCONTINUED | OUTPATIENT
Start: 2022-08-15 | End: 2022-08-15 | Stop reason: HOSPADM

## 2022-08-15 RX ORDER — EPHEDRINE SULFATE 50 MG/ML
5 INJECTION, SOLUTION INTRAVENOUS ONCE AS NEEDED
Status: DISCONTINUED | OUTPATIENT
Start: 2022-08-15 | End: 2022-08-15 | Stop reason: HOSPADM

## 2022-08-15 RX ADMIN — CEFEPIME HYDROCHLORIDE 1 G: 1 INJECTION, POWDER, FOR SOLUTION INTRAMUSCULAR; INTRAVENOUS at 17:52

## 2022-08-15 RX ADMIN — CEFEPIME HYDROCHLORIDE 1 G: 1 INJECTION, POWDER, FOR SOLUTION INTRAMUSCULAR; INTRAVENOUS at 22:17

## 2022-08-15 RX ADMIN — FENTANYL CITRATE 50 MCG: 50 INJECTION INTRAMUSCULAR; INTRAVENOUS at 13:30

## 2022-08-15 RX ADMIN — LIDOCAINE HYDROCHLORIDE 100 MG: 20 INJECTION, SOLUTION INFILTRATION; PERINEURAL at 10:52

## 2022-08-15 RX ADMIN — ROCURONIUM BROMIDE 40 MG: 50 INJECTION INTRAVENOUS at 10:52

## 2022-08-15 RX ADMIN — PROPOFOL 150 MG: 10 INJECTION, EMULSION INTRAVENOUS at 10:52

## 2022-08-15 RX ADMIN — HYDROCODONE BITARTRATE AND ACETAMINOPHEN 1 TABLET: 5; 325 TABLET ORAL at 23:43

## 2022-08-15 RX ADMIN — FENTANYL CITRATE 50 MCG: 50 INJECTION INTRAMUSCULAR; INTRAVENOUS at 10:48

## 2022-08-15 RX ADMIN — Medication 3 MG: at 21:15

## 2022-08-15 RX ADMIN — FENTANYL CITRATE 50 MCG: 50 INJECTION INTRAMUSCULAR; INTRAVENOUS at 11:56

## 2022-08-15 RX ADMIN — SODIUM CHLORIDE, POTASSIUM CHLORIDE, SODIUM LACTATE AND CALCIUM CHLORIDE 9 ML/HR: 600; 310; 30; 20 INJECTION, SOLUTION INTRAVENOUS at 10:00

## 2022-08-15 RX ADMIN — DEXAMETHASONE SODIUM PHOSPHATE 8 MG: 10 INJECTION INTRAMUSCULAR; INTRAVENOUS at 11:00

## 2022-08-15 RX ADMIN — CLONAZEPAM 1 MG: 1 TABLET ORAL at 20:22

## 2022-08-15 RX ADMIN — CEFEPIME HYDROCHLORIDE 1 G: 1 INJECTION, POWDER, FOR SOLUTION INTRAMUSCULAR; INTRAVENOUS at 10:35

## 2022-08-15 RX ADMIN — ONDANSETRON 4 MG: 2 INJECTION INTRAMUSCULAR; INTRAVENOUS at 11:43

## 2022-08-15 RX ADMIN — Medication: at 20:22

## 2022-08-15 RX ADMIN — PHENYLEPHRINE HYDROCHLORIDE 200 MCG: 10 INJECTION, SOLUTION INTRAVENOUS at 11:10

## 2022-08-15 RX ADMIN — OXYBUTYNIN CHLORIDE 5 MG: 5 TABLET, EXTENDED RELEASE ORAL at 20:22

## 2022-08-15 RX ADMIN — DOCUSATE SODIUM 100 MG: 100 CAPSULE, LIQUID FILLED ORAL at 20:22

## 2022-08-15 RX ADMIN — SUGAMMADEX 300 MG: 100 INJECTION, SOLUTION INTRAVENOUS at 11:51

## 2022-08-15 RX ADMIN — HYDROCODONE BITARTRATE AND ACETAMINOPHEN 1 TABLET: 5; 325 TABLET ORAL at 17:51

## 2022-08-15 RX ADMIN — PHENYLEPHRINE HYDROCHLORIDE 100 MCG: 10 INJECTION, SOLUTION INTRAVENOUS at 11:05

## 2022-08-15 NOTE — PROGRESS NOTES
"DAILY PROGRESS NOTE  [facility]    Patient Identification:  Name: Lucien Morrison  Age: 57 y.o.  Sex: male  :  1965  MRN: [id number]         Primary Care Physician: Ran Bragg MD    Subjective:  Interval History: He complains of left knee pain.    Objective:    Scheduled Meds:amLODIPine, 5 mg, Oral, Daily  [MAR Hold] aspirin EC, 325 mg, Oral, Daily  cefepime, 1 g, Intravenous, Q8H  clonazePAM, 1 mg, Oral, BID  [MAR Hold] docusate sodium, 100 mg, Oral, BID  [MAR Hold] escitalopram, 20 mg, Oral, Daily  [MAR Hold] hydroCHLOROthiazide, 12.5 mg, Oral, Daily  [MAR Hold] levothyroxine, 112 mcg, Oral, QAM  [MAR Hold] oxybutynin XL, 5 mg, Oral, BID  [MAR Hold] paliperidone, 12 mg, Oral, Daily  sodium chloride, 10 mL, Intravenous, Q12H  [MAR Hold] tamsulosin, 0.4 mg, Oral, Daily  [MAR Hold] zinc oxide, , Topical, BID      Continuous Infusions:lactated ringers, 9 mL/hr, Last Rate: 9 mL/hr (08/15/22 1000)  sodium chloride, 75 mL/hr, Last Rate: 75 mL/hr (22 2202)        Vital signs in last 24 hours:  Temp:  [96.9 °F (36.1 °C)-98.1 °F (36.7 °C)] 98.1 °F (36.7 °C)  Heart Rate:  [74-88] 75  Resp:  [16] 16  BP: ()/(62-90) 142/81    Intake/Output:    Intake/Output Summary (Last 24 hours) at 8/15/2022 1138  Last data filed at 8/15/2022 1040  Gross per 24 hour   Intake 650 ml   Output 600 ml   Net 50 ml       Exam:  /81 (BP Location: Right arm, Patient Position: Lying)   Pulse 75   Temp 98.1 °F (36.7 °C)   Resp 16   Ht 172.7 cm (68\")   Wt 76.5 kg (168 lb 10.4 oz)   SpO2 99%   BMI 25.64 kg/m²     General Appearance:    Alert, cooperative, no distress   Head:    Normocephalic, without obvious abnormality, atraumatic   Eyes:       Throat:   Lips, tongue, gums normal   Neck:   Supple, symmetrical, trachea midline, no JVD   Lungs:     Clear to auscultation bilaterally, respirations unlabored   Chest Wall:    No tenderness or deformity    Heart:    Regular rate and rhythm, S1 and S2 " normal, no murmur,no  Rub or gallop   Abdomen:     Soft, nontender, bowel sounds active, no masses, no organomegaly    Extremities:   Extremities normal, atraumatic, no cyanosis or edema   Pulses:      Skin:   Skin is warm and dry,  no rashes or palpable lesions   Neurologic:  Mental deficiency      Lab Results (last 72 hours)     Procedure Component Value Units Date/Time    C-reactive Protein [[id number]]  (Abnormal) Collected: 08/13/22 1008    Specimen: Blood Updated: 08/13/22 1132     C-Reactive Protein 1.68 mg/dL     Sedimentation Rate [[id number]]  (Abnormal) Collected: 08/13/22 1008    Specimen: Blood Updated: 08/13/22 1124     Sed Rate 49 mm/hr     Basic Metabolic Panel [[id number]]  (Abnormal) Collected: 08/13/22 0424    Specimen: Blood Updated: 08/13/22 0527     Glucose 95 mg/dL      BUN 12 mg/dL      Creatinine 0.56 mg/dL      Sodium 143 mmol/L      Potassium 3.8 mmol/L      Chloride 106 mmol/L      CO2 29.0 mmol/L      Calcium 8.4 mg/dL      BUN/Creatinine Ratio 21.4     Anion Gap 8.0 mmol/L      eGFR 115.0 mL/min/1.73      Comment: National Kidney Foundation and American Society of Nephrology (ASN) Task Force recommended calculation based on the Chronic Kidney Disease Epidemiology Collaboration (CKD-EPI) equation refit without adjustment for race.       Narrative:      GFR Normal >60  Chronic Kidney Disease <60  Kidney Failure <15      CBC (No Diff) [[id number]]  (Abnormal) Collected: 08/13/22 0424    Specimen: Blood Updated: 08/13/22 0455     WBC 5.39 10*3/mm3      RBC 3.13 10*6/mm3      Hemoglobin 9.6 g/dL      Hematocrit 29.8 %      MCV 95.2 fL      MCH 30.7 pg      MCHC 32.2 g/dL      RDW 12.4 %      RDW-SD 42.7 fl      MPV 9.2 fL      Platelets 313 10*3/mm3     COVID PRE-OP / PRE-PROCEDURE SCREENING ORDER (NO ISOLATION) - Swab, Nasopharynx [[id number]]  (Normal) Collected: 08/12/22 1839    Specimen: Swab from Nasopharynx Updated: 08/12/22 1924    Narrative:      The following orders were created for  panel order COVID PRE-OP / PRE-PROCEDURE SCREENING ORDER (NO ISOLATION) - Swab, Nasopharynx.  Procedure                               Abnormality         Status                     ---------                               -----------         ------                     COVID-19,[facility]...[[id number]]  Normal              Final result                 Please view results for these tests on the individual orders.    COVID-19,[facility] CEPHEID/VERONIKA NP SWAB IN TRANSPORT MEDIA 8-12 HR TAT - Swab, Nasopharynx [[id number]]  (Normal) Collected: 08/12/22 1839    Specimen: Swab from Nasopharynx Updated: 08/12/22 1924     COVID19 Not Detected    Narrative:      Fact sheet for providers: https://www.fda.gov/media/938160/download     Fact sheet for patients: https://www.fda.gov/media/560039/download    Comprehensive Metabolic Panel [[id number]]  (Abnormal) Collected: 08/12/22 1716    Specimen: Blood Updated: 08/12/22 1815     Glucose 112 mg/dL      BUN 15 mg/dL      Creatinine 0.75 mg/dL      Sodium 139 mmol/L      Potassium 4.0 mmol/L      Chloride 100 mmol/L      CO2 31.4 mmol/L      Calcium 8.6 mg/dL      Total Protein 6.7 g/dL      Albumin 3.20 g/dL      ALT (SGPT) 13 U/L      AST (SGOT) 15 U/L      Alkaline Phosphatase 65 U/L      Total Bilirubin <0.2 mg/dL      Globulin 3.5 gm/dL      A/G Ratio 0.9 g/dL      BUN/Creatinine Ratio 20.0     Anion Gap 7.6 mmol/L      eGFR 105.3 mL/min/1.73      Comment: National Kidney Foundation and American Society of Nephrology (ASN) Task Force recommended calculation based on the Chronic Kidney Disease Epidemiology Collaboration (CKD-EPI) equation refit without adjustment for race.       Narrative:      GFR Normal >60  Chronic Kidney Disease <60  Kidney Failure <15      CBC & Differential [[id number]]  (Abnormal) Collected: 08/12/22 1716    Specimen: Blood Updated: 08/12/22 1731    Narrative:      The following orders were created for panel order CBC & Differential.  Procedure                                Abnormality         Status                     ---------                               -----------         ------                     CBC Auto Differential[[id number]]        Abnormal            Final result                 Please view results for these tests on the individual orders.    CBC Auto Differential [[id number]]  (Abnormal) Collected: 08/12/22 1716    Specimen: Blood Updated: 08/12/22 1731     WBC 6.03 10*3/mm3      RBC 3.23 10*6/mm3      Hemoglobin 10.1 g/dL      Hematocrit 30.1 %      MCV 93.2 fL      MCH 31.3 pg      MCHC 33.6 g/dL      RDW 12.5 %      RDW-SD 42.3 fl      MPV 9.0 fL      Platelets 339 10*3/mm3      Neutrophil % 59.7 %      Lymphocyte % 21.9 %      Monocyte % 11.9 %      Eosinophil % 5.5 %      Basophil % 0.7 %      Immature Grans % 0.3 %      Neutrophils, Absolute 3.60 10*3/mm3      Lymphocytes, Absolute 1.32 10*3/mm3      Monocytes, Absolute 0.72 10*3/mm3      Eosinophils, Absolute 0.33 10*3/mm3      Basophils, Absolute 0.04 10*3/mm3      Immature Grans, Absolute 0.02 10*3/mm3      nRBC 0.0 /100 WBC         Data Review:  Results from last 7 days   Lab Units 08/15/22  0443 08/14/22  0505 08/13/22  0424   SODIUM mmol/L 145 142 143   POTASSIUM mmol/L 3.8 3.7 3.8   CHLORIDE mmol/L 107 105 106   CO2 mmol/L 28.9 30.0* 29.0   BUN mg/dL 14 12 12   CREATININE mg/dL 0.62* 0.58* 0.56*   GLUCOSE mg/dL 95 87 95   CALCIUM mg/dL 8.3* 8.3* 8.4*     Results from last 7 days   Lab Units 08/15/22  0443 08/14/22  0505 08/13/22  0424   WBC 10*3/mm3 6.25 5.05 5.39   HEMOGLOBIN g/dL 9.5* 9.5* 9.6*   HEMATOCRIT % 30.1* 29.2* 29.8*   PLATELETS 10*3/mm3 271 301 313             No results found for: TROPONINT      Results from last 7 days   Lab Units 08/12/22 1716   ALK PHOS U/L 65   BILIRUBIN mg/dL <0.2   ALT (SGPT) U/L 13   AST (SGOT) U/L 15             No results found for: POCGLU        Past Medical History:   Diagnosis Date   • Anemia    • Anxiety    • Cataract     bilateral   •  Colitis    • Depression    • Disease of thyroid gland    • Diverticulitis    • Hard to intubate    • Hemorrhoids    • History of BPH    • Hydrocele in adult    • Hyperopia    • Hypertension    • Impulse control disorder    • Seasonal allergies    • Shingles        Assessment:  Active Hospital Problems    Diagnosis  POA   • **Closed displaced transverse fracture of left patella [S82.032A]  Yes   • Anxiety [F41.9]  Unknown   • Depression [F32.A]  Unknown   • Disease of thyroid gland [E07.9]  Unknown   • Hypertension [I10]  Unknown   • History of BPH [Z87.438]  Unknown   • Mental deficiency [F79]  Unknown      Resolved Hospital Problems   No resolved problems to display.       Plan:  Ortho consult noted.   MRI of the left knee report noted.  Continue with antibiotics for possible infection.  We will get follow-up lab studies.  Patient is medically stable for surgery planned today.    Jerzy Emerson MD  8/15/2022  11:38 EDT     10

## 2022-08-15 NOTE — PLAN OF CARE
Goal Outcome Evaluation:   Plan for tentative OR today. NPO since MN. VSS. Midline in place, IV abx given per order. 2L NC. Frequent turns. Voiding via purewick, brief in place. WCTM.

## 2022-08-15 NOTE — ANESTHESIA POSTPROCEDURE EVALUATION
"Patient: Lucien Morrison    Procedure Summary     Date: 08/15/22 Room / Location: [facility] OR [address] [place] [address] MAIN OR    Anesthesia Start: 1040 Anesthesia Stop: 1211    Procedure: PATELLA OPEN REDUCTION INTERNAL FIXATION REVISION AND TENDON REPAIR with EXTERNAL FIXATOR APPLICATOR (Left Knee) Diagnosis:     Surgeons: Juan R Figueroa II, MD Provider: Neal Singleton MD    Anesthesia Type: general ASA Status: 3          Anesthesia Type: general    Vitals  Vitals Value Taken Time   /94 08/15/22 1346   Temp 36.4 °C (97.6 °F) 08/15/22 1205   Pulse 87 08/15/22 1357   Resp     SpO2 94 % 08/15/22 1357   Vitals shown include unvalidated device data.        Post Anesthesia Care and Evaluation    Patient location during evaluation: PACU  Patient participation: complete - patient cannot participate  Pain management: adequate    Airway patency: patent  Anesthetic complications: No anesthetic complications    Cardiovascular status: acceptable  Respiratory status: acceptable  Hydration status: acceptable    Comments: /93 (BP Location: Right arm, Patient Position: Lying)   Pulse 106   Temp 36 °C (96.8 °F) (Skin)   Resp 18   Ht 172.7 cm (68\")   Wt 76.5 kg (168 lb 10.4 oz)   SpO2 94%   BMI 25.64 kg/m²     Patient discharged before being seen by an Anesthesiologist.  No anesthetic complications noted per record.      "

## 2022-08-15 NOTE — PAYOR COMM NOTE
"Lucien Morrison (57 y.o. Male)     PLEASE SEE ATTACHED FOR INPT AUTH.     REF#[id number]    PLEASE CALL  OR  [phone]    THANK YOU    LIZ MORALES LPN CCP            Date of Birth   1965    Social Security Number       Address   [address]  [place] [place] [facility] [place] [address]    Home Phone   [phone]    MRN   [id number]       Rastafarian   None    Marital Status   Single                            Admission Date   8/12/22    Admission Type   Emergency    Admitting Provider   Michela Peralta MD    Attending Provider   Jerzy Emerson MD    Department, Room/Bed   [facility] MAIN OR, [facility] Main OR/MAIN OR       Discharge Date       Discharge Disposition       Discharge Destination                               Attending Provider: Jerzy Emerson MD    Allergies: No Known Allergies    Isolation: None   Infection: None   Code Status: CPR   Advance Care Planning Activity    Ht: 172.7 cm (68\")   Wt: 76.5 kg (168 lb 10.4 oz)    Admission Cmt: None   Principal Problem: Closed displaced transverse fracture of left patella [S82.032A]                 Active Insurance as of 8/12/2022     Primary Coverage     Payor Plan Insurance Group Employer/Plan Group    MEDICARE MEDICARE A & B      Payor Plan Address Payor Plan Phone Number Payor Plan Fax Number Effective Dates    [address] [phone]  1/1/1993 - None Entered    [facility] [address]       Subscriber Name Subscriber Birth Date Member ID       LUCIEN MORRISON 1965 [id number]           Secondary Coverage     Payor Plan Insurance Group Employer/Plan Group    [place]      Payor Plan Address Payor Plan Phone Number Payor Plan Fax Number Effective Dates    [address]   10/20/2020 - None Entered    [place] [address]       Subscriber Name Subscriber Birth Date Member ID       LUCIEN MORRISON 1965 [id number]                 Emergency Contacts      " (Rel.) Home Phone Work Phone Mobile Phone    Christina Walsh (Mother) [phone] -- [phone]    MINE MORRISON (Brother) [phone] -- [phone]               History & Physical      StinglMichela MD at 22          HISTORY AND PHYSICAL   [facility]        Date of Admission: 2022  Patient Identification:  Name: Lucien Morrison  Age: 57 y.o.  Sex: male  :  1965  MRN: [id number]                     Primary Care Physician: Ran Bragg MD    Chief Complaint:  57 year old female who presented to the emergency room from dr parish's office; he has a history of left knee pain and injury; he has a fracture and chronic wound and ortho felt that he was going to need surgical intervention; he denies fever or chills; he is not able to give a good history due to cognitive issues and no family is present; he is aware that surgery is planned and is in agreement because he wants to get better    History of Present Illness:   As above     Past Medical History:  Past Medical History:   Diagnosis Date   • Anemia    • Anxiety    • Cataract     bilateral   • Colitis    • Depression    • Disease of thyroid gland    • Diverticulitis    • Hemorrhoids    • History of BPH    • Hydrocele in adult    • Hyperopia    • Hypertension    • Impulse control disorder    • Seasonal allergies    • Shingles      Past Surgical History:  Past Surgical History:   Procedure Laterality Date   • BACK SURGERY      nodule removed   • ORCHIECTOMY Left    • ORIF HIP FRACTURE Right       Home Meds:  (Not in a hospital admission)      Allergies:  No Known Allergies  Immunizations:    There is no immunization history on file for this patient.  Social History:   Social History     Social History Narrative   • Not on file     Social History     Socioeconomic History   • Marital status: Single   Tobacco Use   • Smoking status: Never Smoker   Vaping Use   • Vaping Use: Never used   Substance and Sexual Activity   •  Alcohol use: Never   • Drug use: Never   • Sexual activity: Defer       Family History:  No family history on file.     Review of Systems  Not obtainable; patient is poor historian    Objective:  T Max 24 hrs: Temp (24hrs), Av.7 °F (35.9 °C), Min:96.7 °F (35.9 °C), Max:96.7 °F (35.9 °C)    Vitals Ranges:   Temp:  [96.7 °F (35.9 °C)] 96.7 °F (35.9 °C)  Heart Rate:  [82-91] 82  Resp:  [16] 16  BP: (113-147)/(69-95) 134/90      Exam:  /90   Pulse 82   Temp 96.7 °F (35.9 °C) (Tympanic)   Resp 16   SpO2 94%     General Appearance:    Alert, cooperative, no distress, appears stated age; chronically ill appearing   Head:    Normocephalic, without obvious abnormality, atraumatic   Eyes:    PERRL, conjunctivae/corneas clear, EOM's intact, both eyes   Ears:    Normal external ear canals, both ears   Nose:   Nares normal, septum midline, mucosa normal, no drainage    or sinus tenderness   Throat:   Lips, mucosa, and tongue normal   Neck:   Supple, symmetrical, trachea midline, no adenopathy;     thyroid:  no enlargement/tenderness/nodules; no carotid    bruit or JVD   Back:     Symmetric, no curvature, ROM normal, no CVA tenderness   Lungs:     Clear to auscultation bilaterally, respirations unlabored   Chest Wall:    No tenderness or deformity    Heart:    Regular rate and rhythm, S1 and S2 normal, no murmur, rub   or gallop   Abdomen:     Soft, nontender, bowel sounds active all four quadrants,     no masses, no hepatomegaly, no splenomegaly   Extremities:   Extremities normal, atraumatic, left knee with dressing, bandage   Pulses:   2+ and symmetric all extremities   Skin:   Skin color, texture, turgor normal, no rashes or lesions   Lymph nodes:   Cervical, supraclavicular, and axillary nodes normal   Neurologic:   CNII-XII intact, normal strength, sensation intact throughout      .    Data Review:  Labs in chart were reviewed.  WBC   Date Value Ref Range Status   2022 6.03 3.40 - 10.80 10*3/mm3 Final      Hemoglobin   Date Value Ref Range Status   08/12/2022 10.1 (L) 13.0 - 17.7 g/dL Final     Hematocrit   Date Value Ref Range Status   08/12/2022 30.1 (L) 37.5 - 51.0 % Final     Platelets   Date Value Ref Range Status   08/12/2022 339 140 - 450 10*3/mm3 Final     Sodium   Date Value Ref Range Status   08/12/2022 139 136 - 145 mmol/L Final     Potassium   Date Value Ref Range Status   08/12/2022 4.0 3.5 - 5.2 mmol/L Final     Chloride   Date Value Ref Range Status   08/12/2022 100 98 - 107 mmol/L Final     CO2   Date Value Ref Range Status   08/12/2022 31.4 (H) 22.0 - 29.0 mmol/L Final     BUN   Date Value Ref Range Status   08/12/2022 15 6 - 20 mg/dL Final     Creatinine   Date Value Ref Range Status   08/12/2022 0.75 (L) 0.76 - 1.27 mg/dL Final     Glucose   Date Value Ref Range Status   08/12/2022 112 (H) 65 - 99 mg/dL Final     Calcium   Date Value Ref Range Status   08/12/2022 8.6 8.6 - 10.5 mg/dL Final     AST (SGOT)   Date Value Ref Range Status   08/12/2022 15 1 - 40 U/L Final     ALT (SGPT)   Date Value Ref Range Status   08/12/2022 13 1 - 41 U/L Final     Alkaline Phosphatase   Date Value Ref Range Status   08/12/2022 65 39 - 117 U/L Final     No results found for: APTT, INR             Imaging Results (All)     Procedure Component Value Units Date/Time    XR Chest 1 View [[id number]] Collected: 08/12/22 1844     Updated: 08/12/22 1849    Narrative:      XR CHEST 1 VW-     HISTORY:  Preop.     COMPARISON:  None     FINDINGS:    A single portable view of the chest was obtained. There is a left PICC.  The cardiac silhouette is upper normal to mildly enlarged. The aorta is  tortuous. There are prominent bibasilar interstitial opacities, which  could reflect atelectasis/scarring, although interstitial pulmonary  edema could have a similar appearance. Pleural spaces are clear. There  is multilevel degenerative disc disease. There is partially imaged  surgical hardware in the proximal right humerus.     This  report was finalized on 8/12/2022 6:46 PM by Dr. Janeen Michel M.D.       XR Knee 1 or 2 View Left [[id number]] Collected: 08/12/22 1722     Updated: 08/12/22 1727    Narrative:      XR KNEE 1 OR 2 VW LEFT-     INDICATIONS: Trauma     TECHNIQUE: 2 views of the left knee     COMPARISON: None available     FINDINGS:     Fracture fragments of the patella are evident, and the patella is  elevated, that may in part be from straight leg positioning, but  certainly raises suspicion for patellar tendon injury/disruption,  correlate with clinical exam. No other evidence of fracture is  identified.       Impression:         As described.           This report was finalized on 8/12/2022 5:24 PM by Dr. Junior Jacobs M.D.               Assessment:  Active Hospital Problems    Diagnosis  POA   • Closed displaced transverse fracture of left patella [S82.032A]  Yes      Resolved Hospital Problems   No resolved problems to display.   hypertension  Hypothyroidism      Plan:  Surgery is planned  Monitor blood pressure  D.w patient and ED Provider  Await further input from ortho  Continue nh meds    Michela Peralta MD  8/12/2022  20:01 EDT      Electronically signed by Michela Peralta MD at 08/12/22 2009          Emergency Department Notes      Degonda, Janet, RN at 08/12/22 1607        Pt needs surgery on his left leg and dr parish sent him to ER to be admitted    Patient was placed in face mask during first look triage.  Patient was wearing a face mask throughout encounter.  I wore personal protective equipment throughout the encounter.  Hand hygiene was performed before and after patient encounter.       Electronically signed by Degonda, Janet, RN at 08/12/22 0878     Rodriguez Matias MD at 08/12/22 1708          EMERGENCY DEPARTMENT ENCOUNTER    Room Number:  17/17  PCP: Ran Bragg MD  Historian: Patient  History Limited By: Nothing      HPI  Chief Complaint: Left knee injury  Context: Lucien Morrison is a 57  y.o. male who presents to the ED c/o left knee injury.  Patient from nursing home sent here for knee surgery.  Patient had a fall and had a fracture of his left knee.  Patient has chronic wound on the left knee as well.  Has been seen by orthopedist and sent in for evaluation and admission for repair.      Location: Left knee  Radiation: None  Character: Aching  Duration: 2 weeks  Severity: Moderate  Progression: Not improving  Aggravating Factors: Nothing  Alleviating Factors: Nothing        MEDICAL RECORD REVIEW    Fall with transverse fracture of patella          PAST MEDICAL HISTORY  Active Ambulatory Problems     Diagnosis Date Noted   • No Active Ambulatory Problems     Resolved Ambulatory Problems     Diagnosis Date Noted   • No Resolved Ambulatory Problems     Past Medical History:   Diagnosis Date   • Anemia    • Anxiety    • Cataract    • Colitis    • Depression    • Disease of thyroid gland    • Diverticulitis    • Hemorrhoids    • History of BPH    • Hydrocele in adult    • Hyperopia    • Hypertension    • Impulse control disorder    • Seasonal allergies    • Shingles          PAST SURGICAL HISTORY  Past Surgical History:   Procedure Laterality Date   • BACK SURGERY      nodule removed   • ORCHIECTOMY Left    • ORIF HIP FRACTURE Right          FAMILY HISTORY  No family history on file.      SOCIAL HISTORY  Social History     Socioeconomic History   • Marital status: Single   Tobacco Use   • Smoking status: Never Smoker   Vaping Use   • Vaping Use: Never used   Substance and Sexual Activity   • Alcohol use: Never   • Drug use: Never   • Sexual activity: Defer         ALLERGIES  Patient has no known allergies.        REVIEW OF SYSTEMS  Review of Systems   Constitutional: Negative for activity change, appetite change and fever.   HENT: Negative for congestion and sore throat.    Eyes: Negative.    Respiratory: Negative for cough and shortness of breath.    Cardiovascular: Negative for chest pain and leg  swelling.   Gastrointestinal: Negative for abdominal pain, diarrhea and vomiting.   Endocrine: Negative.    Genitourinary: Negative for decreased urine volume and dysuria.   Musculoskeletal: Positive for myalgias. Negative for neck pain.   Skin: Negative for rash and wound.   Allergic/Immunologic: Negative.    Neurological: Negative for weakness, numbness and headaches.   Hematological: Negative.    Psychiatric/Behavioral: Negative.    All other systems reviewed and are negative.           PHYSICAL EXAM  ED Triage Vitals [08/12/22 1609]   Temp Heart Rate Resp BP SpO2   96.7 °F (35.9 °C) 91 16 -- 94 %      Temp src Heart Rate Source Patient Position BP Location FiO2 (%)   Tympanic Monitor -- -- --       Physical Exam  Vitals and nursing note reviewed.   Constitutional:       General: He is not in acute distress.  HENT:      Head: Normocephalic and atraumatic.   Eyes:      Pupils: Pupils are equal, round, and reactive to light.   Cardiovascular:      Rate and Rhythm: Normal rate and regular rhythm.      Heart sounds: Normal heart sounds.   Pulmonary:      Effort: Pulmonary effort is normal. No respiratory distress.      Breath sounds: Normal breath sounds.   Abdominal:      Palpations: Abdomen is soft.      Tenderness: There is no abdominal tenderness. There is no guarding or rebound.   Musculoskeletal:         General: Tenderness present. Normal range of motion.      Cervical back: Normal range of motion and neck supple.   Skin:     General: Skin is warm and dry.      Comments: Wound on top of left knee   Neurological:      Mental Status: He is alert and oriented to person, place, and time.      Sensory: Sensation is intact. No sensory deficit.      Motor: No weakness.   Psychiatric:         Mood and Affect: Mood and affect normal.       Patient was wearing a face mask when I entered the room and they continued to wear a mask throughout their stay in the ED.  I wore PPE, including  gloves, face mask with shield or  face mask with goggles whenever I was in the room with patient.       LAB RESULTS  Recent Results (from the past 24 hour(s))   Comprehensive Metabolic Panel    Collection Time: 08/12/22  5:16 PM    Specimen: Blood   Result Value Ref Range    Glucose 112 (H) 65 - 99 mg/dL    BUN 15 6 - 20 mg/dL    Creatinine 0.75 (L) 0.76 - 1.27 mg/dL    Sodium 139 136 - 145 mmol/L    Potassium 4.0 3.5 - 5.2 mmol/L    Chloride 100 98 - 107 mmol/L    CO2 31.4 (H) 22.0 - 29.0 mmol/L    Calcium 8.6 8.6 - 10.5 mg/dL    Total Protein 6.7 6.0 - 8.5 g/dL    Albumin 3.20 (L) 3.50 - 5.20 g/dL    ALT (SGPT) 13 1 - 41 U/L    AST (SGOT) 15 1 - 40 U/L    Alkaline Phosphatase 65 39 - 117 U/L    Total Bilirubin <0.2 0.0 - 1.2 mg/dL    Globulin 3.5 gm/dL    A/G Ratio 0.9 g/dL    BUN/Creatinine Ratio 20.0 7.0 - 25.0    Anion Gap 7.6 5.0 - 15.0 mmol/L    eGFR 105.3 >60.0 mL/min/1.73   CBC Auto Differential    Collection Time: 08/12/22  5:16 PM    Specimen: Blood   Result Value Ref Range    WBC 6.03 3.40 - 10.80 10*3/mm3    RBC 3.23 (L) 4.14 - 5.80 10*6/mm3    Hemoglobin 10.1 (L) 13.0 - 17.7 g/dL    Hematocrit 30.1 (L) 37.5 - 51.0 %    MCV 93.2 79.0 - 97.0 fL    MCH 31.3 26.6 - 33.0 pg    MCHC 33.6 31.5 - 35.7 g/dL    RDW 12.5 12.3 - 15.4 %    RDW-SD 42.3 37.0 - 54.0 fl    MPV 9.0 6.0 - 12.0 fL    Platelets 339 140 - 450 10*3/mm3    Neutrophil % 59.7 42.7 - 76.0 %    Lymphocyte % 21.9 19.6 - 45.3 %    Monocyte % 11.9 5.0 - 12.0 %    Eosinophil % 5.5 0.3 - 6.2 %    Basophil % 0.7 0.0 - 1.5 %    Immature Grans % 0.3 0.0 - 0.5 %    Neutrophils, Absolute 3.60 1.70 - 7.00 10*3/mm3    Lymphocytes, Absolute 1.32 0.70 - 3.10 10*3/mm3    Monocytes, Absolute 0.72 0.10 - 0.90 10*3/mm3    Eosinophils, Absolute 0.33 0.00 - 0.40 10*3/mm3    Basophils, Absolute 0.04 0.00 - 0.20 10*3/mm3    Immature Grans, Absolute 0.02 0.00 - 0.05 10*3/mm3    nRBC 0.0 0.0 - 0.2 /100 WBC   Type & Screen    Collection Time: 08/12/22  5:16 PM    Specimen: Blood   Result Value Ref Range     ABO Type A     RH type Positive     Antibody Screen Negative     T&S Expiration Date 8/15/2022 11:59:59 PM    ECG 12 Lead    Collection Time: 08/12/22  5:18 PM   Result Value Ref Range    QT Interval 383 ms       Ordered the above labs and reviewed the results.        RADIOLOGY  XR Chest 1 View   Final Result      XR Knee 1 or 2 View Left   Final Result       As described.               This report was finalized on 8/12/2022 5:24 PM by Dr. Junior Jacobs M.D.               Ordered the above noted radiological studies. Reviewed by me in PACS.        PROCEDURES  Procedures      EKG:          EKG time: 1718  Rhythm/Rate: Normal sinus rhythm 97  P waves and WI: Normal P waves  QRS, axis: Normal QRS  ST and T waves: Normal ST-T wave    Interpreted Contemporaneously by me, independently viewed  Unchanged compared to prior 6/8/2022        MEDICATIONS GIVEN IN ER  Medications   sodium chloride 0.9 % infusion (has no administration in time range)   acetaminophen (TYLENOL) tablet 650 mg (has no administration in time range)   HYDROcodone-acetaminophen (NORCO) 5-325 MG per tablet 1 tablet (has no administration in time range)   HYDROmorphone (DILAUDID) injection 0.5 mg (has no administration in time range)     And   naloxone (NARCAN) injection 0.4 mg (has no administration in time range)   ondansetron (ZOFRAN) tablet 4 mg (has no administration in time range)     Or   ondansetron (ZOFRAN) injection 4 mg (has no administration in time range)   melatonin tablet 3 mg (has no administration in time range)             PROGRESS AND CONSULTS  ED Course as of 08/12/22 1913   Fri Aug 12, 2022   1817 18:17 EDT  Patient presents for admission for left knee surgery.  Patient has patellar fracture as well as probable patellar tendon rupture.  Patient seen by orthopedist and sent in for admission.  Patient has been discussed with Dr. Peralta who will admit. [SL]      ED Course User Index  [SL] Rodriguez Matias MD           MEDICAL  DECISION MAKING      MDM  Number of Diagnoses or Management Options     Amount and/or Complexity of Data Reviewed  Clinical lab tests: reviewed and ordered (White blood cell count 6)  Tests in the radiology section of CPT®: reviewed and ordered (Patellar fracture)  Discuss the patient with other providers: yes (Discussed with Dr. Figueroa and then Dr. Peralta who will admit.)               DIAGNOSIS  Final diagnoses:   Closed displaced transverse fracture of left patella, initial encounter           DISPOSITION  admit        Latest Documented Vital Signs:  As of 19:13 EDT  BP- 134/90 HR- 82 Temp- 96.7 °F (35.9 °C) (Tympanic) O2 sat- 94%                           Rodriguez Matias MD  08/12/22 1914      Electronically signed by Rodriguez Matias MD at 08/12/22 1914     Radha Avilez, RN at 08/12/22 1904        Attempted to call report twice, no one had come to the phone one time and they let me know they would call me back also.    Electronically signed by Radha Avilez, RN at 08/12/22 1906       Oxygen Therapy (since admission)     Date/Time SpO2 Device (Oxygen Therapy) Flow (L/min) Oxygen Concentration (%) ETCO2 (mmHg)    08/15/22 0923 99 nasal cannula 2 -- --    08/15/22 0700 90 nasal cannula 3 -- --    08/15/22 0308 96 nasal cannula 2 -- --    08/14/22 2259 93 nasal cannula 2 -- --    08/14/22 2120 -- nasal cannula 2 -- --    08/14/22 1921 91 room air -- -- --    08/14/22 1500 90 room air -- -- --    08/14/22 1100 91 room air -- -- --    08/14/22 0900 91 room air -- -- --    08/14/22 0819 -- room air -- -- --    08/14/22 0337 92 room air -- -- --    08/13/22 2251 92 room air -- -- --    08/13/22 2000 -- room air -- -- --    08/13/22 1857 94 room air -- -- --    08/13/22 1500 92 room air -- -- --    08/13/22 1100 92 room air -- -- --    08/13/22 0800 -- room air -- -- --    08/13/22 0700 95 room air -- -- --    08/13/22 0300 93 room air -- -- --    08/12/22 2154 94 room air -- -- --    08/12/22 2034 -- room  air -- -- --    08/12/22 18:30:34 94 -- -- -- --    08/12/22 1801 92 -- -- -- --    08/12/22 1651 94 -- -- -- --    08/12/22 1609 94 room air -- -- --        Intake & Output (last 3 days)       08/12 0701 08/13 0700 08/13 0701  08/14 0700 08/14 0701  08/15 0700 08/15 0701 08/16 0700    P.O.  180 690     I.V. (mL/kg)  600 (7.8) 600 (7.8) 100 (1.3)    IV Piggyback  300 200     Total Intake(mL/kg)  1080 (14.1) 1490 (19.5) 100 (1.3)    Urine (mL/kg/hr)  400 (0.2) 600 (0.3)     Stool   0     Total Output  400 600     Net  +680 +890 +100            Urine Unmeasured Occurrence 2 x 1 x 2 x 1 x    Stool Unmeasured Occurrence   1 x 1 x         Lines, Drains & Airways     Active LDAs     Name Placement date Placement time Site Days    Midline Catheter - Single Lumen 08/10/22 Left 08/10/22  per dressing date  0000  -- 5    External Urinary Catheter 08/13/22  1300  --  1    ETT  08/15/22  1058  created via procedure documentation  -- less than 1                  Medication Administration Report for Lucien Morrison as of 08/15/22 1150   Legend:    Given Hold Not Given Due Canceled Entry Other Actions    Time Time (Time) Time  Time-Action       Discontinued     Completed     Future     MAR Hold     Linked           Medications 08/13/22 08/14/22 08/15/22    acetaminophen (TYLENOL) tablet 650 mg  Dose: 650 mg  Freq: Every 4 Hours PRN Route: PO  PRN Reason: Mild Pain   Start: 08/12/22 1824   Admin Instructions:   Do not exceed 4 grams of acetaminophen in a 24 hr period.    If given for pain, use the following pain scale:   Mild Pain = Pain Score of 1-3, CPOT 1-2  Moderate Pain = Pain Score of 4-6, CPOT 3-4  Severe Pain = Pain Score of 7-10, CPOT 5-8  Do not exceed 4 grams of acetaminophen in a 24 hr period. Max dose of 2gm for AST/ALT greater than 120 units/L    If given for fever, use fever parameter: fever greater than 100.4 °F.    If given for pain, use the following pain scale:   Mild Pain = Pain Score of 1-3, CPOT  1-2  Moderate Pain = Pain Score of 4-6, CPOT 3-4  Severe Pain = Pain Score of 7-10, CPOT 5-8      0920-MAR Hold             amLODIPine (NORVASC) tablet 5 mg  Dose: 5 mg  Freq: Daily Route: PO  Start: 08/13/22 0900   Admin Instructions:   Caution: Look alike/sound alike drug alert. Avoid grapefruit juice.    0910-Given          0819-Given          0900             aspirin EC tablet 325 mg  Dose: 325 mg  Freq: Daily Route: PO  Start: 08/13/22 0900   Admin Instructions:   Herbal/drug interaction: Avoid use with ginkgo biloba. Do not crush or chew.  Do not exceed 4 grams of aspirin in a 24 hr period.    If given for pain, use the following pain scale:   Mild Pain = Pain Score of 1-3, CPOT 1-2  Moderate Pain = Pain Score of 4-6, CPOT 3-4  Severe Pain = Pain Score of 7-10, CPOT 5-8    (0905)-Not Given          (0803)-Not Given [C]          0900     0920-MAR Hold            bupivacaine-EPINEPHrine PF (MARCAINE w/EPI) 0.5% -[id number] injection  Freq: As Needed  Start: 08/15/22 0909      0909-Given             cefepime 1 gm IVPB in 100 mL NS (VTB)  Dose: 1 g  Freq: Every 8 Hours Route: IV  Indications of Use: SKIN AND SOFT TISSUE INFECTION  Start: 08/12/22 2300   End: 08/17/22 2259   Admin Instructions:   Changed to extended infusion per  Ruby protocol, and adjusted for renal function.    1105-New Bag     1600-New Bag     2335-New Bag        0957-New Bag     1609-New Bag     2331-New Bag        1035-New Bag [C]     1500     2300           clonazePAM (KlonoPIN) tablet 1 mg  Dose: 1 mg  Freq: 2 Times Daily Route: PO  Start: 08/12/22 2145   End: 08/19/22 2059   Admin Instructions:   Caution: Look alike/sound alike drug alert. Please read the label.   Caution: Look alike/sound alike drug alert.    0910-Given     2025-Given         0819-Given     2114-Given         0900 2100            docusate sodium (COLACE) capsule 100 mg  Dose: 100 mg  Freq: 2 Times Daily Route: PO  Start: 08/12/22 2145   Admin Instructions:   Swallow  whole.  Do not open, crush, or chew capsule.    0910-Given     2025-Given         0820-Given     2114-Given         0900     0920-MAR Hold     2100-MAR Hold           escitalopram (LEXAPRO) tablet 20 mg  Dose: 20 mg  Freq: Daily Route: PO  Start: 08/13/22 0900   Admin Instructions:   Caution: Look alike/sound alike drug alert.    0910-Given          1311-Given          0900     0920-MAR Hold            hydroCHLOROthiazide (HYDRODIURIL) tablet 12.5 mg  Dose: 12.5 mg  Freq: Daily Route: PO  Start: 08/13/22 0900   Admin Instructions:   Caution: Look alike/sound alike drug alert    0910-Given          0819-Given          0900     0920-MAR Hold            HYDROcodone-acetaminophen (NORCO) 5-325 MG per tablet 1 tablet  Dose: 1 tablet  Freq: Every 6 Hours PRN Route: PO  PRN Reason: Moderate Pain   Start: 08/12/22 2048   Admin Instructions:   [VENKATESH]    Do not exceed 4 grams of acetaminophen in a 24 hr period. Max dose of 2gm for AST/ALT greater than 120 units/L        If given for pain, use the following pain scale:   Mild Pain = Pain Score of 1-3, CPOT 1-2  Moderate Pain = Pain Score of 4-6, CPOT 3-4  Severe Pain = Pain Score of 7-10, CPOT 5-8      0920-MAR Hold             HYDROmorphone (DILAUDID) injection 0.5 mg  Dose: 0.5 mg  Freq: Every 2 Hours PRN Route: IV  PRN Reason: Severe Pain   Start: 08/12/22 1824   End: 08/19/22 1823   Admin Instructions:   If given for pain, use the following pain scale:  Mild Pain = Pain Score of 1-3, CPOT 1-2  Moderate Pain = Pain Score of 4-6, CPOT 3-4  Severe Pain = Pain Score of 7-10, CPOT 5-8      0920-MAR Hold            And  naloxone (NARCAN) injection 0.4 mg  Dose: 0.4 mg  Freq: Every 5 Minutes PRN Route: IV  PRN Reason: Respiratory Depression  Start: 08/12/22 1824   Admin Instructions:   If Respiratory Rate Less Than 8 or Patient is Difficult to Arouse, Stop ALL Narcotics & Contact Provider.  Administer Slow IV Push.  Repeat As Ordered Until Respiratory Rate is Greater Than 12.       0920-MAR Hold             lactated ringers infusion  Rate: 9 mL/hr Dose: 9 mL/hr  Freq: Continuous PRN Route: IV  PRN Comment: Start prior to surgery  Start: 08/15/22 1017      1000-New Bag     1039-Paused [C]     1040-Restarted           levothyroxine (SYNTHROID, LEVOTHROID) tablet 112 mcg  Dose: 112 mcg  Freq: Every Morning Route: PO  Start: 08/13/22 0700   Admin Instructions:   Take on empty stomach.    (0606)-Not Given          0646-Given          (0605)-Not Given     0920-MAR Hold            lidocaine PF 1% (XYLOCAINE) injection 0.5 mL  Dose: 0.5 mL  Freq: Once As Needed Route: IJ  PRN Comment: IV Start  Start: 08/15/22 1017          melatonin tablet 3 mg  Dose: 3 mg  Freq: Nightly PRN Route: PO  PRN Reason: Sleep  Start: 08/12/22 1824      0920-MAR Hold             midazolam (VERSED) injection 1 mg  Dose: 1 mg  Freq: Every 10 Minutes PRN Route: IV  PRN Comment: Anxiety prophylaxis, Pre-op comfort  Start: 08/15/22 1017   Admin Instructions:   May repeat dose in 10 minutes one time then contact provider for additional orders.            ondansetron (ZOFRAN) tablet 4 mg  Dose: 4 mg  Freq: Every 6 Hours PRN Route: PO  PRN Reasons: Nausea,Vomiting  Start: 08/12/22 1824      0920-MAR Hold            Or  ondansetron (ZOFRAN) injection 4 mg  Dose: 4 mg  Freq: Every 6 Hours PRN Route: IV  PRN Reasons: Nausea,Vomiting  Start: 08/12/22 1824      0920-MAR Hold             oxybutynin XL (DITROPAN-XL) 24 hr tablet 5 mg  Dose: 5 mg  Freq: 2 Times Daily Route: PO  Start: 08/12/22 2300   Admin Instructions:   Swallow whole. Do not crush, split or chew.    0911-Given     2025-Given         0819-Given     2115-Given         0900     0920-MAR Hold     2100-MAR Hold           paliperidone (INVEGA) 24 hr tablet 12 mg  Dose: 12 mg  Freq: Daily Route: PO  Start: 08/13/22 0900   Admin Instructions:   Swallow whole. Do not crush, split or chew.    0911-Given          0820-Given          0900 0920-MAR Hold            sodium chloride  0.9 % flush 10 mL  Dose: 10 mL  Freq: As Needed Route: IV  PRN Reason: Line Care  Start: 08/15/22 1017          sodium chloride 0.9 % flush 10 mL  Dose: 10 mL  Freq: Every 12 Hours Scheduled Route: IV  Start: 08/15/22 1020      1020     2100            sodium chloride 0.9 % infusion  Rate: 75 mL/hr Dose: 75 mL/hr  Freq: Continuous Route: IV  Start: 08/12/22 1826          sodium chloride 1,000 mL with povidone-iodine 40 mL irrigation  Freq: As Needed  Start: 08/15/22 0909      0909-Given             sodium chloride 3,000 mL with povidone-iodine 118 mL irrigation  Freq: As Needed  Start: 08/15/22 1118      1118-Given [C]             tamsulosin (FLOMAX) 24 hr capsule 0.4 mg  Dose: 0.4 mg  Freq: Daily Route: PO  Start: 08/13/22 0900   Admin Instructions:   Swallow whole. Do not crush or chew.    0911-Given          0820-Given          0900 0920-MAR Hold            zinc oxide (DESITIN) 40 % paste  Freq: 2 Times Daily Route: TOP  Start: 08/12/22 2300    0914-Given     2025-Given         0810-Given     2114-Given         0900 0920-MAR Hold     2100-MAR Hold          Completed Medications  Medications 08/13/22 08/14/22 08/15/22       gadobenate dimeglumine (MULTIHANCE) injection 15 mL  Dose: 15 mL  Freq: Once in Imaging Route: IV  Start: 08/13/22 1900   End: 08/13/22 1819   Admin Instructions:   Vesicant; admin as rapid bolus; flush with 5 mL NS after admin or 20 mL for renal or aortoiliofemoral vasculature    1819-Given [C]              Discontinued Medications  Medications 08/13/22 08/14/22 08/15/22       Cefepime HCl (MAXIPIME) injection 1 g  Dose: 1 g  Freq: 2 Times Daily Route: IV  Indications of Use: SKIN AND SOFT TISSUE INFECTION  Start: 08/12/22 2145   End: 08/12/22 2119          HYDROcodone-acetaminophen (NORCO) 5-325 MG per tablet 1 tablet  Dose: 1 tablet  Freq: Every 4 Hours PRN Route: PO  PRN Reason: Moderate Pain   Start: 08/12/22 1824   End: 08/12/22 2111   Admin Instructions:   [VENKATESH]    Do not exceed  4 grams of acetaminophen in a 24 hr period.    If given for pain, use the following pain scale:   Mild Pain = Pain Score of 1-3, CPOT 1-2  Moderate Pain = Pain Score of 4-6, CPOT 3-4  Severe Pain = Pain Score of 7-10, CPOT 5-8  [VENKATESH]    Do not exceed 4 grams of acetaminophen in a 24 hr period. Max dose of 2gm for AST/ALT greater than 120 units/L        If given for pain, use the following pain scale:   Mild Pain = Pain Score of 1-3, CPOT 1-2  Moderate Pain = Pain Score of 4-6, CPOT 3-4  Severe Pain = Pain Score of 7-10, CPOT 5-8                 Operative/Procedure Notes (all)    No notes of this type exist for this encounter.            Physician Progress Notes (all)      Jerzy Emerson MD at 08/15/22 1138          DAILY PROGRESS NOTE  [facility]    Patient Identification:  Name: Lucien Morrison  Age: 57 y.o.  Sex: male  :  1965  MRN: [id number]         Primary Care Physician: Ran Bragg MD    Subjective:  Interval History: He complains of left knee pain.    Objective:    Scheduled Meds:amLODIPine, 5 mg, Oral, Daily  [MAR Hold] aspirin EC, 325 mg, Oral, Daily  cefepime, 1 g, Intravenous, Q8H  clonazePAM, 1 mg, Oral, BID  [MAR Hold] docusate sodium, 100 mg, Oral, BID  [MAR Hold] escitalopram, 20 mg, Oral, Daily  [MAR Hold] hydroCHLOROthiazide, 12.5 mg, Oral, Daily  [MAR Hold] levothyroxine, 112 mcg, Oral, QAM  [MAR Hold] oxybutynin XL, 5 mg, Oral, BID  [MAR Hold] paliperidone, 12 mg, Oral, Daily  sodium chloride, 10 mL, Intravenous, Q12H  [MAR Hold] tamsulosin, 0.4 mg, Oral, Daily  [MAR Hold] zinc oxide, , Topical, BID      Continuous Infusions:lactated ringers, 9 mL/hr, Last Rate: 9 mL/hr (08/15/22 1000)  sodium chloride, 75 mL/hr, Last Rate: 75 mL/hr (22 2202)        Vital signs in last 24 hours:  Temp:  [96.9 °F (36.1 °C)-98.1 °F (36.7 °C)] 98.1 °F (36.7 °C)  Heart Rate:  [74-88] 75  Resp:  [16] 16  BP: ()/(62-90) 142/81    Intake/Output:    Intake/Output Summary (Last 24  "hours) at 8/15/2022 1138  Last data filed at 8/15/2022 1040  Gross per 24 hour   Intake 650 ml   Output 600 ml   Net 50 ml       Exam:  /81 (BP Location: Right arm, Patient Position: Lying)   Pulse 75   Temp 98.1 °F (36.7 °C)   Resp 16   Ht 172.7 cm (68\")   Wt 76.5 kg (168 lb 10.4 oz)   SpO2 99%   BMI 25.64 kg/m²     General Appearance:    Alert, cooperative, no distress   Head:    Normocephalic, without obvious abnormality, atraumatic   Eyes:       Throat:   Lips, tongue, gums normal   Neck:   Supple, symmetrical, trachea midline, no JVD   Lungs:     Clear to auscultation bilaterally, respirations unlabored   Chest Wall:    No tenderness or deformity    Heart:    Regular rate and rhythm, S1 and S2 normal, no murmur,no  Rub or gallop   Abdomen:     Soft, nontender, bowel sounds active, no masses, no organomegaly    Extremities:   Extremities normal, atraumatic, no cyanosis or edema   Pulses:      Skin:   Skin is warm and dry,  no rashes or palpable lesions   Neurologic:  Mental deficiency      Lab Results (last 72 hours)     Procedure Component Value Units Date/Time    C-reactive Protein [[id number]]  (Abnormal) Collected: 08/13/22 1008    Specimen: Blood Updated: 08/13/22 1132     C-Reactive Protein 1.68 mg/dL     Sedimentation Rate [[id number]]  (Abnormal) Collected: 08/13/22 1008    Specimen: Blood Updated: 08/13/22 1124     Sed Rate 49 mm/hr     Basic Metabolic Panel [[id number]]  (Abnormal) Collected: 08/13/22 0424    Specimen: Blood Updated: 08/13/22 0527     Glucose 95 mg/dL      BUN 12 mg/dL      Creatinine 0.56 mg/dL      Sodium 143 mmol/L      Potassium 3.8 mmol/L      Chloride 106 mmol/L      CO2 29.0 mmol/L      Calcium 8.4 mg/dL      BUN/Creatinine Ratio 21.4     Anion Gap 8.0 mmol/L      eGFR 115.0 mL/min/1.73      Comment: National Kidney Foundation and American Society of Nephrology (ASN) Task Force recommended calculation based on the Chronic Kidney Disease Epidemiology Collaboration " (CKD-EPI) equation refit without adjustment for race.       Narrative:      GFR Normal >60  Chronic Kidney Disease <60  Kidney Failure <15      CBC (No Diff) [[id number]]  (Abnormal) Collected: 08/13/22 0424    Specimen: Blood Updated: 08/13/22 0455     WBC 5.39 10*3/mm3      RBC 3.13 10*6/mm3      Hemoglobin 9.6 g/dL      Hematocrit 29.8 %      MCV 95.2 fL      MCH 30.7 pg      MCHC 32.2 g/dL      RDW 12.4 %      RDW-SD 42.7 fl      MPV 9.2 fL      Platelets 313 10*3/mm3     COVID PRE-OP / PRE-PROCEDURE SCREENING ORDER (NO ISOLATION) - Swab, Nasopharynx [[id number]]  (Normal) Collected: 08/12/22 1839    Specimen: Swab from Nasopharynx Updated: 08/12/22 1924    Narrative:      The following orders were created for panel order COVID PRE-OP / PRE-PROCEDURE SCREENING ORDER (NO ISOLATION) - Swab, Nasopharynx.  Procedure                               Abnormality         Status                     ---------                               -----------         ------                     COVID-19,[facility]...[[id number]]  Normal              Final result                 Please view results for these tests on the individual orders.    COVID-19,[facility] CEPHEID/VERONIKA NP SWAB IN TRANSPORT MEDIA 8-12 HR TAT - Swab, Nasopharynx [[id number]]  (Normal) Collected: 08/12/22 1839    Specimen: Swab from Nasopharynx Updated: 08/12/22 1924     COVID19 Not Detected    Narrative:      Fact sheet for providers: https://www.fda.gov/media/228902/download     Fact sheet for patients: https://www.fda.gov/media/233645/download    Comprehensive Metabolic Panel [[id number]]  (Abnormal) Collected: 08/12/22 1716    Specimen: Blood Updated: 08/12/22 1815     Glucose 112 mg/dL      BUN 15 mg/dL      Creatinine 0.75 mg/dL      Sodium 139 mmol/L      Potassium 4.0 mmol/L      Chloride 100 mmol/L      CO2 31.4 mmol/L      Calcium 8.6 mg/dL      Total Protein 6.7 g/dL      Albumin 3.20 g/dL      ALT (SGPT) 13 U/L      AST (SGOT) 15 U/L      Alkaline  Phosphatase 65 U/L      Total Bilirubin <0.2 mg/dL      Globulin 3.5 gm/dL      A/G Ratio 0.9 g/dL      BUN/Creatinine Ratio 20.0     Anion Gap 7.6 mmol/L      eGFR 105.3 mL/min/1.73      Comment: National Kidney Foundation and American Society of Nephrology (ASN) Task Force recommended calculation based on the Chronic Kidney Disease Epidemiology Collaboration (CKD-EPI) equation refit without adjustment for race.       Narrative:      GFR Normal >60  Chronic Kidney Disease <60  Kidney Failure <15      CBC & Differential [[id number]]  (Abnormal) Collected: 08/12/22 1716    Specimen: Blood Updated: 08/12/22 1731    Narrative:      The following orders were created for panel order CBC & Differential.  Procedure                               Abnormality         Status                     ---------                               -----------         ------                     CBC Auto Differential[[id number]]        Abnormal            Final result                 Please view results for these tests on the individual orders.    CBC Auto Differential [[id number]]  (Abnormal) Collected: 08/12/22 1716    Specimen: Blood Updated: 08/12/22 1731     WBC 6.03 10*3/mm3      RBC 3.23 10*6/mm3      Hemoglobin 10.1 g/dL      Hematocrit 30.1 %      MCV 93.2 fL      MCH 31.3 pg      MCHC 33.6 g/dL      RDW 12.5 %      RDW-SD 42.3 fl      MPV 9.0 fL      Platelets 339 10*3/mm3      Neutrophil % 59.7 %      Lymphocyte % 21.9 %      Monocyte % 11.9 %      Eosinophil % 5.5 %      Basophil % 0.7 %      Immature Grans % 0.3 %      Neutrophils, Absolute 3.60 10*3/mm3      Lymphocytes, Absolute 1.32 10*3/mm3      Monocytes, Absolute 0.72 10*3/mm3      Eosinophils, Absolute 0.33 10*3/mm3      Basophils, Absolute 0.04 10*3/mm3      Immature Grans, Absolute 0.02 10*3/mm3      nRBC 0.0 /100 WBC         Data Review:  Results from last 7 days   Lab Units 08/15/22  0443 08/14/22  0505 08/13/22  0424   SODIUM mmol/L 145 142 143   POTASSIUM mmol/L 3.8 3.7  3.8   CHLORIDE mmol/L 107 105 106   CO2 mmol/L 28.9 30.0* 29.0   BUN mg/dL 14 12 12   CREATININE mg/dL 0.62* 0.58* 0.56*   GLUCOSE mg/dL 95 87 95   CALCIUM mg/dL 8.3* 8.3* 8.4*     Results from last 7 days   Lab Units 08/15/22  0443 22  0505 22  0424   WBC 10*3/mm3 6.25 5.05 5.39   HEMOGLOBIN g/dL 9.5* 9.5* 9.6*   HEMATOCRIT % 30.1* 29.2* 29.8*   PLATELETS 10*3/mm3 271 301 313             No results found for: TROPONINT      Results from last 7 days   Lab Units 22  1716   ALK PHOS U/L 65   BILIRUBIN mg/dL <0.2   ALT (SGPT) U/L 13   AST (SGOT) U/L 15             No results found for: POCGLU        Past Medical History:   Diagnosis Date   • Anemia    • Anxiety    • Cataract     bilateral   • Colitis    • Depression    • Disease of thyroid gland    • Diverticulitis    • Hard to intubate    • Hemorrhoids    • History of BPH    • Hydrocele in adult    • Hyperopia    • Hypertension    • Impulse control disorder    • Seasonal allergies    • Shingles        Assessment:  Active Hospital Problems    Diagnosis  POA   • **Closed displaced transverse fracture of left patella [S82.032A]  Yes   • Anxiety [F41.9]  Unknown   • Depression [F32.A]  Unknown   • Disease of thyroid gland [E07.9]  Unknown   • Hypertension [I10]  Unknown   • History of BPH [Z87.438]  Unknown   • Mental deficiency [F79]  Unknown      Resolved Hospital Problems   No resolved problems to display.       Plan:  Ortho consult noted.   MRI of the left knee report noted.  Continue with antibiotics for possible infection.  We will get follow-up lab studies.  Patient is medically stable for surgery planned today.    Jerzy Emerson MD  8/15/2022  11:38 EDT      Electronically signed by Jerzy Emerson MD at 08/15/22 1138     Jerzy Emerson MD at 22 1149          DAILY PROGRESS NOTE  [facility]    Patient Identification:  Name: Lucien Morrison  Age: 57 y.o.  Sex: male  :  1965  MRN: [id number]         Primary Care  "Physician: Ran Bragg MD    Subjective:  Interval History: He complains of left knee pain.    Objective:    Scheduled Meds:amLODIPine, 5 mg, Oral, Daily  aspirin EC, 325 mg, Oral, Daily  cefepime, 1 g, Intravenous, Q8H  clonazePAM, 1 mg, Oral, BID  docusate sodium, 100 mg, Oral, BID  escitalopram, 20 mg, Oral, Daily  hydroCHLOROthiazide, 12.5 mg, Oral, Daily  levothyroxine, 112 mcg, Oral, QAM  oxybutynin XL, 5 mg, Oral, BID  paliperidone, 12 mg, Oral, Daily  tamsulosin, 0.4 mg, Oral, Daily  zinc oxide, , Topical, BID      Continuous Infusions:sodium chloride, 75 mL/hr, Last Rate: 75 mL/hr (08/12/22 2202)        Vital signs in last 24 hours:  Temp:  [96.9 °F (36.1 °C)-97.3 °F (36.3 °C)] 97.3 °F (36.3 °C)  Heart Rate:  [74-95] 83  Resp:  [16] 16  BP: (107-149)/(61-81) 149/81    Intake/Output:    Intake/Output Summary (Last 24 hours) at 8/14/2022 1149  Last data filed at 8/14/2022 1013  Gross per 24 hour   Intake 2020 ml   Output 400 ml   Net 1620 ml       Exam:  /81 (BP Location: Right arm, Patient Position: Lying)   Pulse 83   Temp 97.3 °F (36.3 °C) (Oral)   Resp 16   Ht 172.7 cm (68\")   Wt 76.5 kg (168 lb 10.4 oz)   SpO2 91%   BMI 25.64 kg/m²     General Appearance:    Alert, cooperative, no distress   Head:    Normocephalic, without obvious abnormality, atraumatic   Eyes:       Throat:   Lips, tongue, gums normal   Neck:   Supple, symmetrical, trachea midline, no JVD   Lungs:     Clear to auscultation bilaterally, respirations unlabored   Chest Wall:    No tenderness or deformity    Heart:    Regular rate and rhythm, S1 and S2 normal, no murmur,no  Rub or gallop   Abdomen:     Soft, nontender, bowel sounds active, no masses, no organomegaly    Extremities:   Extremities normal, atraumatic, no cyanosis or edema   Pulses:      Skin:   Skin is warm and dry,  no rashes or palpable lesions   Neurologic:  Mental deficiency      Lab Results (last 72 hours)     Procedure Component Value Units Date/Time    " C-reactive Protein [[id number]]  (Abnormal) Collected: 08/13/22 1008    Specimen: Blood Updated: 08/13/22 1132     C-Reactive Protein 1.68 mg/dL     Sedimentation Rate [[id number]]  (Abnormal) Collected: 08/13/22 1008    Specimen: Blood Updated: 08/13/22 1124     Sed Rate 49 mm/hr     Basic Metabolic Panel [[id number]]  (Abnormal) Collected: 08/13/22 0424    Specimen: Blood Updated: 08/13/22 0527     Glucose 95 mg/dL      BUN 12 mg/dL      Creatinine 0.56 mg/dL      Sodium 143 mmol/L      Potassium 3.8 mmol/L      Chloride 106 mmol/L      CO2 29.0 mmol/L      Calcium 8.4 mg/dL      BUN/Creatinine Ratio 21.4     Anion Gap 8.0 mmol/L      eGFR 115.0 mL/min/1.73      Comment: National Kidney Foundation and American Society of Nephrology (ASN) Task Force recommended calculation based on the Chronic Kidney Disease Epidemiology Collaboration (CKD-EPI) equation refit without adjustment for race.       Narrative:      GFR Normal >60  Chronic Kidney Disease <60  Kidney Failure <15      CBC (No Diff) [[id number]]  (Abnormal) Collected: 08/13/22 0424    Specimen: Blood Updated: 08/13/22 0455     WBC 5.39 10*3/mm3      RBC 3.13 10*6/mm3      Hemoglobin 9.6 g/dL      Hematocrit 29.8 %      MCV 95.2 fL      MCH 30.7 pg      MCHC 32.2 g/dL      RDW 12.4 %      RDW-SD 42.7 fl      MPV 9.2 fL      Platelets 313 10*3/mm3     COVID PRE-OP / PRE-PROCEDURE SCREENING ORDER (NO ISOLATION) - Swab, Nasopharynx [[id number]]  (Normal) Collected: 08/12/22 1839    Specimen: Swab from Nasopharynx Updated: 08/12/22 1924    Narrative:      The following orders were created for panel order COVID PRE-OP / PRE-PROCEDURE SCREENING ORDER (NO ISOLATION) - Swab, Nasopharynx.  Procedure                               Abnormality         Status                     ---------                               -----------         ------                     COVID-19, [facility]...[[id number]]  Normal              Final result                 Please view results for  these tests on the individual orders.    COVID-19,[facility] CEPHEID/VERNOIKA NP SWAB IN TRANSPORT MEDIA 8-12 HR TAT - Swab, Nasopharynx [[id number]]  (Normal) Collected: 08/12/22 1839    Specimen: Swab from Nasopharynx Updated: 08/12/22 1924     COVID19 Not Detected    Narrative:      Fact sheet for providers: https://www.fda.gov/media/464988/download     Fact sheet for patients: https://www.fda.gov/media/013927/download    Comprehensive Metabolic Panel [[id number]]  (Abnormal) Collected: 08/12/22 1716    Specimen: Blood Updated: 08/12/22 1815     Glucose 112 mg/dL      BUN 15 mg/dL      Creatinine 0.75 mg/dL      Sodium 139 mmol/L      Potassium 4.0 mmol/L      Chloride 100 mmol/L      CO2 31.4 mmol/L      Calcium 8.6 mg/dL      Total Protein 6.7 g/dL      Albumin 3.20 g/dL      ALT (SGPT) 13 U/L      AST (SGOT) 15 U/L      Alkaline Phosphatase 65 U/L      Total Bilirubin <0.2 mg/dL      Globulin 3.5 gm/dL      A/G Ratio 0.9 g/dL      BUN/Creatinine Ratio 20.0     Anion Gap 7.6 mmol/L      eGFR 105.3 mL/min/1.73      Comment: National Kidney Foundation and American Society of Nephrology (ASN) Task Force recommended calculation based on the Chronic Kidney Disease Epidemiology Collaboration (CKD-EPI) equation refit without adjustment for race.       Narrative:      GFR Normal >60  Chronic Kidney Disease <60  Kidney Failure <15      CBC & Differential [[id number]]  (Abnormal) Collected: 08/12/22 1716    Specimen: Blood Updated: 08/12/22 1731    Narrative:      The following orders were created for panel order CBC & Differential.  Procedure                               Abnormality         Status                     ---------                               -----------         ------                     CBC Auto Differential[[id number]]        Abnormal            Final result                 Please view results for these tests on the individual orders.    CBC Auto Differential [[id number]]  (Abnormal) Collected: 08/12/22  1716    Specimen: Blood Updated: 08/12/22 1731     WBC 6.03 10*3/mm3      RBC 3.23 10*6/mm3      Hemoglobin 10.1 g/dL      Hematocrit 30.1 %      MCV 93.2 fL      MCH 31.3 pg      MCHC 33.6 g/dL      RDW 12.5 %      RDW-SD 42.3 fl      MPV 9.0 fL      Platelets 339 10*3/mm3      Neutrophil % 59.7 %      Lymphocyte % 21.9 %      Monocyte % 11.9 %      Eosinophil % 5.5 %      Basophil % 0.7 %      Immature Grans % 0.3 %      Neutrophils, Absolute 3.60 10*3/mm3      Lymphocytes, Absolute 1.32 10*3/mm3      Monocytes, Absolute 0.72 10*3/mm3      Eosinophils, Absolute 0.33 10*3/mm3      Basophils, Absolute 0.04 10*3/mm3      Immature Grans, Absolute 0.02 10*3/mm3      nRBC 0.0 /100 WBC         Data Review:  Results from last 7 days   Lab Units 08/14/22  0505 08/13/22  0424 08/12/22  1716   SODIUM mmol/L 142 143 139   POTASSIUM mmol/L 3.7 3.8 4.0   CHLORIDE mmol/L 105 106 100   CO2 mmol/L 30.0* 29.0 31.4*   BUN mg/dL 12 12 15   CREATININE mg/dL 0.58* 0.56* 0.75*   GLUCOSE mg/dL 87 95 112*   CALCIUM mg/dL 8.3* 8.4* 8.6     Results from last 7 days   Lab Units 08/14/22  0505 08/13/22  0424 08/12/22  1716   WBC 10*3/mm3 5.05 5.39 6.03   HEMOGLOBIN g/dL 9.5* 9.6* 10.1*   HEMATOCRIT % 29.2* 29.8* 30.1*   PLATELETS 10*3/mm3 301 313 339             No results found for: TROPONINT      Results from last 7 days   Lab Units 08/12/22  1716   ALK PHOS U/L 65   BILIRUBIN mg/dL <0.2   ALT (SGPT) U/L 13   AST (SGOT) U/L 15             No results found for: POCGLU        Past Medical History:   Diagnosis Date   • Anemia    • Anxiety    • Cataract     bilateral   • Colitis    • Depression    • Disease of thyroid gland    • Diverticulitis    • Hemorrhoids    • History of BPH    • Hydrocele in adult    • Hyperopia    • Hypertension    • Impulse control disorder    • Seasonal allergies    • Shingles        Assessment:  Active Hospital Problems    Diagnosis  POA   • **Closed displaced transverse fracture of left patella [S82.115A]  Yes   •  "Anxiety [F41.9]  Unknown   • Depression [F32.A]  Unknown   • Disease of thyroid gland [E07.9]  Unknown   • Hypertension [I10]  Unknown   • History of BPH [Z87.438]  Unknown   • Mental deficiency [F79]  Unknown      Resolved Hospital Problems   No resolved problems to display.       Plan:  Ortho consult noted.   MRI of the left knee report noted.  Continue with antibiotics for possible infection.  We will get follow-up lab studies.    Jerzy Emerson MD  2022  11:49 EDT      Electronically signed by Jerzy Emerson MD at 22 [phone]     Juan R Figueroa II, MD at 22 0789        Patient's MRI from yesterday demonstrates complete patellar tendon repair and partial quadricep tendon tear along with displacement of his patella fracture which had previously been repaired.  Plan for surgery tomorrow.  I am going to have a discussion with the POA today about the plan    R \"Huang\" Carolina WHEATLEY MD  Orthopaedic Surgery  [facility]  [phone] - [place] [facility]  [phone] - [place] [facility]      Electronically signed by Juan R Figueroa II, MD at 22 [phone]     Jerzy Emerson MD at 22 1227          DAILY PROGRESS NOTE  [facility]    Patient Identification:  Name: Lucien Morrison  Age: 57 y.o.  Sex: male  :  1965  MRN: [id number]         Primary Care Physician: Ran Bragg MD    Subjective:  Interval History: He complains of left knee pain.    Objective:    Scheduled Meds:amLODIPine, 5 mg, Oral, Daily  aspirin EC, 325 mg, Oral, Daily  cefepime, 1 g, Intravenous, Q8H  clonazePAM, 1 mg, Oral, BID  docusate sodium, 100 mg, Oral, BID  escitalopram, 20 mg, Oral, Daily  hydroCHLOROthiazide, 12.5 mg, Oral, Daily  levothyroxine, 112 mcg, Oral, QAM  oxybutynin XL, 5 mg, Oral, BID  paliperidone, 12 mg, Oral, Daily  tamsulosin, 0.4 mg, Oral, Daily  zinc oxide, , Topical, BID      Continuous Infusions:sodium chloride, 75 mL/hr, Last Rate: 75 mL/hr " "(08/12/22 2202)        Vital signs in last 24 hours:  Temp:  [96.7 °F (35.9 °C)-98 °F (36.7 °C)] 97 °F (36.1 °C)  Heart Rate:  [74-91] 82  Resp:  [16] 16  BP: (113-147)/(69-95) 140/89    Intake/Output:  No intake or output data in the 24 hours ending 08/13/22 1220    Exam:  /89 (BP Location: Right arm, Patient Position: Lying)   Pulse 82   Temp 97 °F (36.1 °C) (Oral)   Resp 16   Ht 172.7 cm (68\")   Wt 76.5 kg (168 lb 10.4 oz)   SpO2 92%   BMI 25.64 kg/m²     General Appearance:    Alert, cooperative, no distress   Head:    Normocephalic, without obvious abnormality, atraumatic   Eyes:       Throat:   Lips, tongue, gums normal   Neck:   Supple, symmetrical, trachea midline, no JVD   Lungs:     Clear to auscultation bilaterally, respirations unlabored   Chest Wall:    No tenderness or deformity    Heart:    Regular rate and rhythm, S1 and S2 normal, no murmur,no  Rub or gallop   Abdomen:     Soft, nontender, bowel sounds active, no masses, no organomegaly    Extremities:   Extremities normal, atraumatic, no cyanosis or edema   Pulses:      Skin:   Skin is warm and dry,  no rashes or palpable lesions   Neurologic:  Mental deficiency      Lab Results (last 72 hours)     Procedure Component Value Units Date/Time    C-reactive Protein [[id number]]  (Abnormal) Collected: 08/13/22 1008    Specimen: Blood Updated: 08/13/22 1132     C-Reactive Protein 1.68 mg/dL     Sedimentation Rate [[id number]]  (Abnormal) Collected: 08/13/22 1008    Specimen: Blood Updated: 08/13/22 1124     Sed Rate 49 mm/hr     Basic Metabolic Panel [[id number]]  (Abnormal) Collected: 08/13/22 0424    Specimen: Blood Updated: 08/13/22 0527     Glucose 95 mg/dL      BUN 12 mg/dL      Creatinine 0.56 mg/dL      Sodium 143 mmol/L      Potassium 3.8 mmol/L      Chloride 106 mmol/L      CO2 29.0 mmol/L      Calcium 8.4 mg/dL      BUN/Creatinine Ratio 21.4     Anion Gap 8.0 mmol/L      eGFR 115.0 mL/min/1.73      Comment: National Kidney Foundation " and American Society of Nephrology (ASN) Task Force recommended calculation based on the Chronic Kidney Disease Epidemiology Collaboration (CKD-EPI) equation refit without adjustment for race.       Narrative:      GFR Normal >60  Chronic Kidney Disease <60  Kidney Failure <15      CBC (No Diff) [[id number]]  (Abnormal) Collected: 08/13/22 0424    Specimen: Blood Updated: 08/13/22 0455     WBC 5.39 10*3/mm3      RBC 3.13 10*6/mm3      Hemoglobin 9.6 g/dL      Hematocrit 29.8 %      MCV 95.2 fL      MCH 30.7 pg      MCHC 32.2 g/dL      RDW 12.4 %      RDW-SD 42.7 fl      MPV 9.2 fL      Platelets 313 10*3/mm3     COVID PRE-OP / PRE-PROCEDURE SCREENING ORDER (NO ISOLATION) - Swab, Nasopharynx [[id number]]  (Normal) Collected: 08/12/22 1839    Specimen: Swab from Nasopharynx Updated: 08/12/22 1924    Narrative:      The following orders were created for panel order COVID PRE-OP / PRE-PROCEDURE SCREENING ORDER (NO ISOLATION) - Swab, Nasopharynx.  Procedure                               Abnormality         Status                     ---------                               -----------         ------                     COVID-19,[facility]...[[id number]]  Normal              Final result                 Please view results for these tests on the individual orders.    COVID-19,[facility] CEPHEID/VERONIKA NP SWAB IN TRANSPORT MEDIA 8-12 HR TAT - Swab, Nasopharynx [[id number]]  (Normal) Collected: 08/12/22 1839    Specimen: Swab from Nasopharynx Updated: 08/12/22 1924     COVID19 Not Detected    Narrative:      Fact sheet for providers: https://www.fda.gov/media/055471/download     Fact sheet for patients: https://www.fda.gov/media/428298/download    Comprehensive Metabolic Panel [[id number]]  (Abnormal) Collected: 08/12/22 1716    Specimen: Blood Updated: 08/12/22 1815     Glucose 112 mg/dL      BUN 15 mg/dL      Creatinine 0.75 mg/dL      Sodium 139 mmol/L      Potassium 4.0 mmol/L      Chloride 100 mmol/L      CO2 31.4  mmol/L      Calcium 8.6 mg/dL      Total Protein 6.7 g/dL      Albumin 3.20 g/dL      ALT (SGPT) 13 U/L      AST (SGOT) 15 U/L      Alkaline Phosphatase 65 U/L      Total Bilirubin <0.2 mg/dL      Globulin 3.5 gm/dL      A/G Ratio 0.9 g/dL      BUN/Creatinine Ratio 20.0     Anion Gap 7.6 mmol/L      eGFR 105.3 mL/min/1.73      Comment: National Kidney Foundation and American Society of Nephrology (ASN) Task Force recommended calculation based on the Chronic Kidney Disease Epidemiology Collaboration (CKD-EPI) equation refit without adjustment for race.       Narrative:      GFR Normal >60  Chronic Kidney Disease <60  Kidney Failure <15      CBC & Differential [[id number]]  (Abnormal) Collected: 08/12/22 1716    Specimen: Blood Updated: 08/12/22 1731    Narrative:      The following orders were created for panel order CBC & Differential.  Procedure                               Abnormality         Status                     ---------                               -----------         ------                     CBC Auto Differential[[id number]]        Abnormal            Final result                 Please view results for these tests on the individual orders.    CBC Auto Differential [[id number]]  (Abnormal) Collected: 08/12/22 1716    Specimen: Blood Updated: 08/12/22 1731     WBC 6.03 10*3/mm3      RBC 3.23 10*6/mm3      Hemoglobin 10.1 g/dL      Hematocrit 30.1 %      MCV 93.2 fL      MCH 31.3 pg      MCHC 33.6 g/dL      RDW 12.5 %      RDW-SD 42.3 fl      MPV 9.0 fL      Platelets 339 10*3/mm3      Neutrophil % 59.7 %      Lymphocyte % 21.9 %      Monocyte % 11.9 %      Eosinophil % 5.5 %      Basophil % 0.7 %      Immature Grans % 0.3 %      Neutrophils, Absolute 3.60 10*3/mm3      Lymphocytes, Absolute 1.32 10*3/mm3      Monocytes, Absolute 0.72 10*3/mm3      Eosinophils, Absolute 0.33 10*3/mm3      Basophils, Absolute 0.04 10*3/mm3      Immature Grans, Absolute 0.02 10*3/mm3      nRBC 0.0 /100 WBC         Data  Review:  Results from last 7 days   Lab Units 08/13/22  0424 08/12/22  1716   SODIUM mmol/L 143 139   POTASSIUM mmol/L 3.8 4.0   CHLORIDE mmol/L 106 100   CO2 mmol/L 29.0 31.4*   BUN mg/dL 12 15   CREATININE mg/dL 0.56* 0.75*   GLUCOSE mg/dL 95 112*   CALCIUM mg/dL 8.4* 8.6     Results from last 7 days   Lab Units 08/13/22  0424 08/12/22  1716   WBC 10*3/mm3 5.39 6.03   HEMOGLOBIN g/dL 9.6* 10.1*   HEMATOCRIT % 29.8* 30.1*   PLATELETS 10*3/mm3 313 339             No results found for: TROPONINT      Results from last 7 days   Lab Units 08/12/22  1716   ALK PHOS U/L 65   BILIRUBIN mg/dL <0.2   ALT (SGPT) U/L 13   AST (SGOT) U/L 15             No results found for: POCGLU        Past Medical History:   Diagnosis Date   • Anemia    • Anxiety    • Cataract     bilateral   • Colitis    • Depression    • Disease of thyroid gland    • Diverticulitis    • Hemorrhoids    • History of BPH    • Hydrocele in adult    • Hyperopia    • Hypertension    • Impulse control disorder    • Seasonal allergies    • Shingles        Assessment:  Active Hospital Problems    Diagnosis  POA   • **Closed displaced transverse fracture of left patella [S82.032A]  Yes   • Anxiety [F41.9]  Unknown   • Depression [F32.A]  Unknown   • Disease of thyroid gland [E07.9]  Unknown   • Hypertension [I10]  Unknown   • History of BPH [Z87.438]  Unknown   • Mental deficiency [F79]  Unknown      Resolved Hospital Problems   No resolved problems to display.       Plan:  Ortho consult noted.  Plans for MRI of the left knee.  Continue with antibiotics for possible infection.  We will get follow-up lab studies.    Jerzy Emerson MD  8/13/2022  12:20 EDT      Electronically signed by Jerzy Emerson MD at 08/13/22 1222          Consult Notes (all)      Juan R Figueroa II, MD at 08/13/22 0862      Consult Orders    1. Inpatient Orthopedic Surgery Consult [[id number]] ordered by Michela Peralta MD at 08/12/22 8166                 Orthopaedic  Surgery  Consult Note  Dr. ELKIN Weston” [facility]  [phone]    HPI:  Patient is a 57 y.o. Not  or  male with a history of a left patella fracture.  He has developmental delay.  He underwent uncomplicated open reduction and internal fixation.  He was sent to a rehab facility where he had a difficult time protecting his leg due to his mental capacity.  He was bending the knee and putting a lot of weight on it.  Unfortunately, that led to a malunion of the patella.  He was then brought back to my clinic yesterday with a wound to the anterior knee and exposed FiberWire and bone.  I sent him immediately to the ER to be admitted.  X-rays in the ER demonstrated a high riding patella with concern for possible patellar tendon injury, which would be a new injury.  This morning he is sleeping with no complaints of pain.    MEDICAL HISTORY  Past Medical History:   Diagnosis Date   • Anemia    • Anxiety    • Cataract     bilateral   • Colitis    • Depression    • Disease of thyroid gland    • Diverticulitis    • Hemorrhoids    • History of BPH    • Hydrocele in adult    • Hyperopia    • Hypertension    • Impulse control disorder    • Seasonal allergies    • Shingles    ·   Past Surgical History:   Procedure Laterality Date   • BACK SURGERY      nodule removed   • ORCHIECTOMY Left    • ORIF HIP FRACTURE Right    ·   Prior to Admission medications    Medication Sig Start Date End Date Taking? Authorizing Provider   amLODIPine (NORVASC) 5 MG tablet Take 5 mg by mouth Daily.   Yes Calvin Chester MD   aspirin  MG tablet Take 325 mg by mouth Daily.   Yes Calvin Chester MD   Cefepime HCl 100 g reconstituted solution Infuse 1 g into a venous catheter 2 (Two) Times a Day. 8/9/22 8/23/22 Yes Calvin Chester MD   clonazePAM (KlonoPIN) 1 MG tablet Take 1 mg by mouth 2 (Two) Times a Day. 1/1/20  Yes Dylan Abel MD   docusate sodium (COLACE) 100 MG capsule Take 100 mg by mouth 2 (Two) Times a Day.    Yes Calvin Chester MD   escitalopram (LEXAPRO) 20 MG tablet Take 20 mg by mouth Daily. 1/1/20  Yes Lorna Roberson MD   hydroCHLOROthiazide (MICROZIDE) 12.5 MG capsule Take 12.5 mg by mouth Daily.   Yes Calvin Chester MD   levothyroxine (SYNTHROID, LEVOTHROID) 125 MCG tablet Take 112 mcg by mouth Daily. 1/1/20  Yes Maki Bal APRN   oxybutynin XL (DITROPAN-XL) 5 MG 24 hr tablet Take 5 mg by mouth 2 (Two) Times a Day.   Yes Calvin Chester MD   paliperidone (INVEGA) 6 MG 24 hr tablet Take 12 mg by mouth Daily. 1/1/20  Yes Lorna Roberson MD   tamsulosin (FLOMAX) 0.4 MG capsule 24 hr capsule Take 1 capsule by mouth Daily.   Yes Lindsay Juarez MD   Zinc Oxide 25 % paste Apply 1 application topically to the appropriate area as directed 2 (Two) Times a Day. Apply To Buttocks   Yes Calvin Chester MD   acetaminophen (TYLENOL) 325 MG tablet Take 650 mg by mouth Every 6 (Six) Hours As Needed for Mild Pain . 1-2 Tablets    Calvin Chester MD   HYDROcodone-acetaminophen (NORCO) 5-325 MG per tablet Take 1 tablet by mouth Every 6 (Six) Hours As Needed for Moderate Pain . 6/3/22   Lindsay Felix APRN   loperamide (IMODIUM) 2 MG capsule Take 2 mg by mouth 4 (Four) Times a Day As Needed for Diarrhea.    Calvin Chester MD   ondansetron (ZOFRAN) 4 MG tablet Take 4 mg by mouth Every 4 (Four) Hours As Needed for Nausea or Vomiting.    Calvin Chester MD   pseudoephedrine (SUDAFED) 120 MG 12 hr tablet Take 120 mg by mouth Every 12 (Twelve) Hours As Needed for Congestion.    Calvin Chester MD   ·   · No Known Allergies  ·   · There is no immunization history on file for this patient.  Social History     Tobacco Use   • Smoking status: Never Smoker   • Smokeless tobacco: Not on file   Substance Use Topics   • Alcohol use: Never   ·    Social History     Substance and Sexual Activity   Drug Use Never   ·     VITALS: /79 (BP Location: Right arm, Patient  Position: Lying)   Pulse 74   Temp 96.7 °F (35.9 °C) (Oral)   Resp 16   SpO2 95%  There is no height or weight on file to calculate BMI.    PHYSICAL EXAM:   · CONSTITUTIONAL: No acute distress  · LUNGS: Equal chest rise, no shortness of air  · CARDIOVASCULAR: palpable peripheral pulses  · SKIN: no skin lesions in the area examined  · LYMPH: no lymphadenopathy in the area examined  · EXTREMITY: Left Lower Extremity  · Tenderness to Palpation: No tenderness to palpation  · Gross Deformity: 2 x 2 centimeter full-thickness wound in the anterior knee with exposed patella and patellar tendon  · Pulses:  Brisk Capillary Refill  · Sensation: Intact to Saphenous, Sural, Deep Peroneal, Superficial Peroneal, and Tibial Nerves and grossly throughout extremity  · Motor: 5/5 EHL/FHL/TA/GS motor complexes    RADIOLOGY REVIEW:   XR Knee 1 or 2 View Left    Result Date: 8/12/2022   As described.    This report was finalized on 8/12/2022 5:24 PM by Dr. Junior Jacobs M.D.        LABS:   Results for the past 24 hours:   Recent Results (from the past 24 hour(s))   Comprehensive Metabolic Panel    Collection Time: 08/12/22  5:16 PM    Specimen: Blood   Result Value Ref Range    Glucose 112 (H) 65 - 99 mg/dL    BUN 15 6 - 20 mg/dL    Creatinine 0.75 (L) 0.76 - 1.27 mg/dL    Sodium 139 136 - 145 mmol/L    Potassium 4.0 3.5 - 5.2 mmol/L    Chloride 100 98 - 107 mmol/L    CO2 31.4 (H) 22.0 - 29.0 mmol/L    Calcium 8.6 8.6 - 10.5 mg/dL    Total Protein 6.7 6.0 - 8.5 g/dL    Albumin 3.20 (L) 3.50 - 5.20 g/dL    ALT (SGPT) 13 1 - 41 U/L    AST (SGOT) 15 1 - 40 U/L    Alkaline Phosphatase 65 39 - 117 U/L    Total Bilirubin <0.2 0.0 - 1.2 mg/dL    Globulin 3.5 gm/dL    A/G Ratio 0.9 g/dL    BUN/Creatinine Ratio 20.0 7.0 - 25.0    Anion Gap 7.6 5.0 - 15.0 mmol/L    eGFR 105.3 >60.0 mL/min/1.73   CBC Auto Differential    Collection Time: 08/12/22  5:16 PM    Specimen: Blood   Result Value Ref Range    WBC 6.03 3.40 - 10.80 10*3/mm3    RBC  3.23 (L) 4.14 - 5.80 10*6/mm3    Hemoglobin 10.1 (L) 13.0 - 17.7 g/dL    Hematocrit 30.1 (L) 37.5 - 51.0 %    MCV 93.2 79.0 - 97.0 fL    MCH 31.3 26.6 - 33.0 pg    MCHC 33.6 31.5 - 35.7 g/dL    RDW 12.5 12.3 - 15.4 %    RDW-SD 42.3 37.0 - 54.0 fl    MPV 9.0 6.0 - 12.0 fL    Platelets 339 140 - 450 10*3/mm3    Neutrophil % 59.7 42.7 - 76.0 %    Lymphocyte % 21.9 19.6 - 45.3 %    Monocyte % 11.9 5.0 - 12.0 %    Eosinophil % 5.5 0.3 - 6.2 %    Basophil % 0.7 0.0 - 1.5 %    Immature Grans % 0.3 0.0 - 0.5 %    Neutrophils, Absolute 3.60 1.70 - 7.00 10*3/mm3    Lymphocytes, Absolute 1.32 0.70 - 3.10 10*3/mm3    Monocytes, Absolute 0.72 0.10 - 0.90 10*3/mm3    Eosinophils, Absolute 0.33 0.00 - 0.40 10*3/mm3    Basophils, Absolute 0.04 0.00 - 0.20 10*3/mm3    Immature Grans, Absolute 0.02 0.00 - 0.05 10*3/mm3    nRBC 0.0 0.0 - 0.2 /100 WBC   Type & Screen    Collection Time: 08/12/22  5:16 PM    Specimen: Blood   Result Value Ref Range    ABO Type A     RH type Positive     Antibody Screen Negative     T&S Expiration Date 8/15/2022 11:59:59 PM    ECG 12 Lead    Collection Time: 08/12/22  5:18 PM   Result Value Ref Range    QT Interval 383 ms   COVID-19,[facility] CEPHEID/VERONIKA NP SWAB IN TRANSPORT MEDIA 8-12 HR TAT - Swab, Nasopharynx    Collection Time: 08/12/22  6:39 PM    Specimen: Nasopharynx; Swab   Result Value Ref Range    COVID19 Not Detected Not Detected - Ref. Range   Basic Metabolic Panel    Collection Time: 08/13/22  4:24 AM    Specimen: Blood   Result Value Ref Range    Glucose 95 65 - 99 mg/dL    BUN 12 6 - 20 mg/dL    Creatinine 0.56 (L) 0.76 - 1.27 mg/dL    Sodium 143 136 - 145 mmol/L    Potassium 3.8 3.5 - 5.2 mmol/L    Chloride 106 98 - 107 mmol/L    CO2 29.0 22.0 - 29.0 mmol/L    Calcium 8.4 (L) 8.6 - 10.5 mg/dL    BUN/Creatinine Ratio 21.4 7.0 - 25.0    Anion Gap 8.0 5.0 - 15.0 mmol/L    eGFR 115.0 >60.0 mL/min/1.73   CBC (No Diff)    Collection Time: 08/13/22  4:24 AM    Specimen: Blood   Result  "Value Ref Range    WBC 5.39 3.40 - 10.80 10*3/mm3    RBC 3.13 (L) 4.14 - 5.80 10*6/mm3    Hemoglobin 9.6 (L) 13.0 - 17.7 g/dL    Hematocrit 29.8 (L) 37.5 - 51.0 %    MCV 95.2 79.0 - 97.0 fL    MCH 30.7 26.6 - 33.0 pg    MCHC 32.2 31.5 - 35.7 g/dL    RDW 12.4 12.3 - 15.4 %    RDW-SD 42.7 37.0 - 54.0 fl    MPV 9.2 6.0 - 12.0 fL    Platelets 313 140 - 450 10*3/mm3       IMPRESSION:  Patient is a 57 y.o. Not  or  male with left open patellar fracture with nonunion and possible concomitant patellar tendon disruption    PLAN:   · Admited to: Michela Peralta MD  · Disposition: Unfortunately this is a very bad situation with a poor prognosis for the function of the knee.  I am going to get an MRI of the knee to better assess the injury and look at the patella tendon.  He will require at least some sort of surgical intervention for proper wound closure and irrigation and debridement.  I am unsure what kind of reconstruction will be possible.    R \"Huang\" Carolina WHEATLEY MD  Orthopaedic Surgery  [facility]  [phone] - [place] Office  [phone] - [place] Office              Electronically signed by Juan R Figueroa II, MD at 08/13/22 0826       "

## 2022-08-15 NOTE — ANESTHESIA PROCEDURE NOTES
Airway  Urgency: elective    Date/Time: 8/15/2022 10:58 AM  Difficult airway    General Information and Staff    Patient location during procedure: OR  Anesthesiologist: Neal Singleton MD  CRNA/CAA: Anali Chang CRNA    Indications and Patient Condition  Indications for airway management: airway protection    Preoxygenated: yes  MILS maintained throughout  Mask difficulty assessment: 2 - vent by mask + OA or adjuvant +/- NMBA    Final Airway Details  Final airway type: endotracheal airway      Successful airway: ETT  Cuffed: yes   Successful intubation technique: video laryngoscopy  Facilitating devices/methods: intubating stylet and cricoid pressure  Endotracheal tube insertion site: oral  Blade: CMAC  Blade size: D  ETT size (mm): 8.0  Cormack-Lehane Classification: grade I - full view of glottis  Placement verified by: chest auscultation and capnometry   Cuff volume (mL): 10  Measured from: teeth  ETT/EBT  to teeth (cm): 23  Number of attempts at approach: 1  Assessment: lips, teeth, and gum same as pre-op and atraumatic intubation    Additional Comments  [facility] utilized first attempt R/T assessment of and history of difficult intubation.

## 2022-08-15 NOTE — ANESTHESIA PREPROCEDURE EVALUATION
Anesthesia Evaluation     history of anesthetic complications: difficult airway               Airway   Mallampati: III  TM distance: >3 FB  Small opening, Narrow palate and Possible difficult intubation  Dental    (+) poor dentition    Pulmonary    Cardiovascular         Neuro/Psych  (+) psychiatric history Anxiety and Depression,      ROS Comment: Mental deficiency   GI/Hepatic/Renal/Endo    (+)   thyroid problem     Musculoskeletal     Abdominal    Substance History      OB/GYN          Other             per brother patient has been told he has a small airway and has been difficult to intubate prior, would plan CMAC in the room          Anesthesia Plan    ASA 3     general     intravenous induction     Anesthetic plan, risks, benefits, and alternatives have been provided, discussed and informed consent has been obtained with: patient.    Plan discussed with CRNA.        CODE STATUS:    Code Status (Patient has no pulse and is not breathing): CPR (Attempt to Resuscitate)  Medical Interventions (Patient has pulse or is breathing): Full Support

## 2022-08-15 NOTE — NURSING NOTE
Spoke with RN at [facility] about vascular device in left arm, she verified that the patient had a midline placed 8/10/22 for IV abx. Daily MAR notified that pt had midline instead of PICC line. This RN added midline LDA to flowsheet and removed PICC LDA.

## 2022-08-15 NOTE — NURSING NOTE
08/15/22 0827   Wound 08/12/22 2045 medial coccyx   Placement Date/Time: 08/12/22 2045   Present on Hospital Admission: Yes  Orientation: medial  Location: coccyx   Pressure Injury Stage 2   Base clean;dermis;pink   Periwound intact;blanchable   Periwound Temperature warm   Periwound Skin Turgor soft   Wound Length (cm) 0.3 cm   Wound Width (cm) 0.4 cm   Wound Surface Area (cm^2) 0.12 cm^2   Drainage Amount none   Dressing Care   (peeled back Optifoam to assess his wound)   Skin Interventions   Pressure Reduction Devices pressure-redistributing mattress utilized;alternating pressure pump (ADD)   Pressure Reduction Techniques heels elevated off bed;positioned off wounds;pressure points protected   Skin Protection silicone foam dressing in place     CWON note: pt seen for evaluation of sacral wound POA.  Wound appears to be improving since admission. (See above assessment). Pt is going to the OR this morning for his left knee. Will defer care of knee to ortho. Keron heels are negative for breakdown or erythema. Wound care and prevention standing orders placed into [facility]. Please re-consult for any additional needs.

## 2022-08-16 LAB
ANION GAP SERPL CALCULATED.3IONS-SCNC: 8.5 MMOL/L (ref 5–15)
BASOPHILS # BLD AUTO: 0.02 10*3/MM3 (ref 0–0.2)
BASOPHILS NFR BLD AUTO: 0.2 % (ref 0–1.5)
BUN SERPL-MCNC: 21 MG/DL (ref 6–20)
BUN/CREAT SERPL: 38.9 (ref 7–25)
CALCIUM SPEC-SCNC: 8.2 MG/DL (ref 8.6–10.5)
CHLORIDE SERPL-SCNC: 102 MMOL/L (ref 98–107)
CO2 SERPL-SCNC: 28.5 MMOL/L (ref 22–29)
CREAT SERPL-MCNC: 0.54 MG/DL (ref 0.76–1.27)
DEPRECATED RDW RBC AUTO: 43.4 FL (ref 37–54)
EGFRCR SERPLBLD CKD-EPI 2021: 116.2 ML/MIN/1.73
EOSINOPHIL # BLD AUTO: 0.03 10*3/MM3 (ref 0–0.4)
EOSINOPHIL NFR BLD AUTO: 0.4 % (ref 0.3–6.2)
ERYTHROCYTE [DISTWIDTH] IN BLOOD BY AUTOMATED COUNT: 12.3 % (ref 12.3–15.4)
GLUCOSE SERPL-MCNC: 107 MG/DL (ref 65–99)
HCT VFR BLD AUTO: 27.7 % (ref 37.5–51)
HGB BLD-MCNC: 8.8 G/DL (ref 13–17.7)
IMM GRANULOCYTES # BLD AUTO: 0.05 10*3/MM3 (ref 0–0.05)
IMM GRANULOCYTES NFR BLD AUTO: 0.6 % (ref 0–0.5)
LYMPHOCYTES # BLD AUTO: 1.7 10*3/MM3 (ref 0.7–3.1)
LYMPHOCYTES NFR BLD AUTO: 20 % (ref 19.6–45.3)
MCH RBC QN AUTO: 30.9 PG (ref 26.6–33)
MCHC RBC AUTO-ENTMCNC: 31.8 G/DL (ref 31.5–35.7)
MCV RBC AUTO: 97.2 FL (ref 79–97)
MONOCYTES # BLD AUTO: 0.96 10*3/MM3 (ref 0.1–0.9)
MONOCYTES NFR BLD AUTO: 11.3 % (ref 5–12)
NEUTROPHILS NFR BLD AUTO: 5.74 10*3/MM3 (ref 1.7–7)
NEUTROPHILS NFR BLD AUTO: 67.5 % (ref 42.7–76)
NRBC BLD AUTO-RTO: 0 /100 WBC (ref 0–0.2)
PLATELET # BLD AUTO: 267 10*3/MM3 (ref 140–450)
PMV BLD AUTO: 10 FL (ref 6–12)
POTASSIUM SERPL-SCNC: 3.7 MMOL/L (ref 3.5–5.2)
RBC # BLD AUTO: 2.85 10*6/MM3 (ref 4.14–5.8)
SODIUM SERPL-SCNC: 139 MMOL/L (ref 136–145)
WBC NRBC COR # BLD: 8.5 10*3/MM3 (ref 3.4–10.8)

## 2022-08-16 PROCEDURE — 80048 BASIC METABOLIC PNL TOTAL CA: CPT | Performed by: HOSPITALIST

## 2022-08-16 PROCEDURE — 25010000002 HYDROMORPHONE PER 4 MG: Performed by: ORTHOPAEDIC SURGERY

## 2022-08-16 PROCEDURE — P9612 CATHETERIZE FOR URINE SPEC: HCPCS

## 2022-08-16 PROCEDURE — 25010000002 CEFEPIME PER 500 MG: Performed by: ORTHOPAEDIC SURGERY

## 2022-08-16 PROCEDURE — 85025 COMPLETE CBC W/AUTO DIFF WBC: CPT | Performed by: HOSPITALIST

## 2022-08-16 RX ORDER — HYDROCODONE BITARTRATE AND ACETAMINOPHEN 5; 325 MG/1; MG/1
1 TABLET ORAL EVERY 4 HOURS PRN
Status: DISCONTINUED | OUTPATIENT
Start: 2022-08-16 | End: 2022-08-23 | Stop reason: HOSPADM

## 2022-08-16 RX ADMIN — CEFEPIME HYDROCHLORIDE 1 G: 1 INJECTION, POWDER, FOR SOLUTION INTRAMUSCULAR; INTRAVENOUS at 05:04

## 2022-08-16 RX ADMIN — PALIPERIDONE 12 MG: 6 TABLET, EXTENDED RELEASE ORAL at 08:41

## 2022-08-16 RX ADMIN — CLONAZEPAM 1 MG: 1 TABLET ORAL at 08:41

## 2022-08-16 RX ADMIN — HYDROCODONE BITARTRATE AND ACETAMINOPHEN 1 TABLET: 5; 325 TABLET ORAL at 05:43

## 2022-08-16 RX ADMIN — DOCUSATE SODIUM 100 MG: 100 CAPSULE, LIQUID FILLED ORAL at 08:41

## 2022-08-16 RX ADMIN — LEVOTHYROXINE SODIUM 112 MCG: 0.11 TABLET ORAL at 05:04

## 2022-08-16 RX ADMIN — HYDROCHLOROTHIAZIDE 12.5 MG: 12.5 TABLET ORAL at 08:41

## 2022-08-16 RX ADMIN — CLONAZEPAM 1 MG: 1 TABLET ORAL at 21:07

## 2022-08-16 RX ADMIN — DOCUSATE SODIUM 100 MG: 100 CAPSULE, LIQUID FILLED ORAL at 21:07

## 2022-08-16 RX ADMIN — CEFEPIME HYDROCHLORIDE 1 G: 1 INJECTION, POWDER, FOR SOLUTION INTRAMUSCULAR; INTRAVENOUS at 16:12

## 2022-08-16 RX ADMIN — Medication: at 09:09

## 2022-08-16 RX ADMIN — TAMSULOSIN HYDROCHLORIDE 0.4 MG: 0.4 CAPSULE ORAL at 08:41

## 2022-08-16 RX ADMIN — HYDROMORPHONE HYDROCHLORIDE 0.5 MG: 1 INJECTION, SOLUTION INTRAMUSCULAR; INTRAVENOUS; SUBCUTANEOUS at 13:12

## 2022-08-16 RX ADMIN — ASPIRIN 325 MG: 325 TABLET, COATED ORAL at 08:41

## 2022-08-16 RX ADMIN — ESCITALOPRAM 20 MG: 20 TABLET, FILM COATED ORAL at 08:41

## 2022-08-16 RX ADMIN — OXYBUTYNIN CHLORIDE 5 MG: 5 TABLET, EXTENDED RELEASE ORAL at 21:07

## 2022-08-16 RX ADMIN — OXYBUTYNIN CHLORIDE 5 MG: 5 TABLET, EXTENDED RELEASE ORAL at 08:41

## 2022-08-16 RX ADMIN — HYDROCODONE BITARTRATE AND ACETAMINOPHEN 1 TABLET: 5; 325 TABLET ORAL at 11:24

## 2022-08-16 RX ADMIN — AMLODIPINE BESYLATE 5 MG: 5 TABLET ORAL at 08:41

## 2022-08-16 RX ADMIN — Medication: at 21:07

## 2022-08-16 NOTE — DISCHARGE PLACEMENT REQUEST
"Lucien Morrison (57 y.o. Male)             Date of Birth   1965    Social Security Number       Address   [address]  [place] [address]    Home Phone   [phone]    MRN   [id number]       Congregation   None    Marital Status   Single                            Admission Date   8/12/22    Admission Type   Emergency    Admitting Provider   Michela Peralta MD    Attending Provider   Hector Worrell MD    Department, Room/Bed   [address], P881/1       Discharge Date       Discharge Disposition       Discharge Destination                               Attending Provider: Hector Worrell MD    Allergies: No Known Allergies    Isolation: Contact   Infection: Candida Auris (rule out) (08/15/22)   Code Status: CPR   Advance Care Planning Activity    Ht: 172.7 cm (68\")   Wt: 76.5 kg (168 lb 10.4 oz)    Admission Cmt: None   Principal Problem: Closed displaced transverse fracture of left patella [S82.032A]                 Active Insurance as of 8/12/2022     Primary Coverage     Payor Plan Insurance Group Employer/Plan Group    MEDICARE MEDICARE A & B      Payor Plan Address Payor Plan Phone Number Payor Plan Fax Number Effective Dates    [address] [phone]  1/1/1993 - None Entered    [facility] [address]       Subscriber Name Subscriber Birth Date Member ID       LUCIEN MORRISON 1965 [id number]           Secondary Coverage     Payor Plan Insurance Group Employer/Plan Group    [place]      Payor Plan Address Payor Plan Phone Number Payor Plan Fax Number Effective Dates    [address]   10/20/2020 - None Entered    [place] [address]       Subscriber Name Subscriber Birth Date Member ID       LUCIEN MORRISON 1965 [id number]                 Emergency Contacts      (Rel.) Home Phone Work Phone Mobile Phone    Christina Walsh (Mother) [phone] -- [phone]    MINE MORRISON " (Brother) [phone] -- [phone]

## 2022-08-16 NOTE — PROGRESS NOTES
Patient is postop day 1 left leg external fixator placement with revision of patella tendon.  Dressing clean dry and intact.  A little bit of serosanguineous draining from the pin sites.  Otherwise looking good

## 2022-08-16 NOTE — PROGRESS NOTES
"DAILY PROGRESS NOTE  [facility]    Patient Identification:  Name: Lucien Morrison  Age: 57 y.o.  Sex: male  :  1965  MRN: [id number]         Primary Care Physician: Ran Bragg MD    Subjective:    Patient is lying on the bed and is complaining of pain in his left leg.  Denies nausea, vomiting, abdominal pain, chest pain.    Objective:    Scheduled Meds:amLODIPine, 5 mg, Oral, Daily  aspirin EC, 325 mg, Oral, Daily  cefepime, 1 g, Intravenous, Q8H  clonazePAM, 1 mg, Oral, BID  docusate sodium, 100 mg, Oral, BID  escitalopram, 20 mg, Oral, Daily  hydroCHLOROthiazide, 12.5 mg, Oral, Daily  levothyroxine, 112 mcg, Oral, QAM  oxybutynin XL, 5 mg, Oral, BID  paliperidone, 12 mg, Oral, Daily  tamsulosin, 0.4 mg, Oral, Daily  zinc oxide, , Topical, BID      Continuous Infusions:lactated ringers, 9 mL/hr, Last Rate: 9 mL/hr (08/15/22 1000)  sodium chloride, 75 mL/hr, Last Rate: 75 mL/hr (22 2202)        Vital signs in last 24 hours:  Temp:  [96.6 °F (35.9 °C)-97.6 °F (36.4 °C)] 97.1 °F (36.2 °C)  Heart Rate:  [] 85  Resp:  [16] 16  BP: (105-139)/(50-88) 105/50    Intake/Output:    Intake/Output Summary (Last 24 hours) at 2022 1656  Last data filed at 2022 1219  Gross per 24 hour   Intake 120 ml   Output 700 ml   Net -580 ml       Exam:  /50 (BP Location: Right arm, Patient Position: Lying)   Pulse 85   Temp 97.1 °F (36.2 °C) (Oral)   Resp 16   Ht 172.7 cm (68\")   Wt 76.5 kg (168 lb 10.4 oz)   SpO2 93%   BMI 25.64 kg/m²     General Appearance:    Alert, cooperative, no distress   Head:    Normocephalic, without obvious abnormality, atraumatic   Eyes:       Throat:   Lips, tongue, gums normal   Neck:   Supple, symmetrical, trachea midline, no JVD   Lungs:     Clear to auscultation bilaterally, respirations unlabored   Chest Wall:    No tenderness or deformity    Heart:    Regular rate and rhythm, S1 and S2 normal, no murmur,no  Rub or gallop   Abdomen:     Soft, " nontender, bowel sounds active, no masses, no organomegaly    Extremities:   Extremities normal, atraumatic, no cyanosis or edema   Pulses:      Skin:   Skin is warm and dry,  no rashes or palpable lesions   Neurologic:  Mental deficiency      Lab Results (last 72 hours)     Procedure Component Value Units Date/Time    C-reactive Protein [[id number]]  (Abnormal) Collected: 08/13/22 1008    Specimen: Blood Updated: 08/13/22 1132     C-Reactive Protein 1.68 mg/dL     Sedimentation Rate [[id number]]  (Abnormal) Collected: 08/13/22 1008    Specimen: Blood Updated: 08/13/22 1124     Sed Rate 49 mm/hr     Basic Metabolic Panel [[id number]]  (Abnormal) Collected: 08/13/22 0424    Specimen: Blood Updated: 08/13/22 0527     Glucose 95 mg/dL      BUN 12 mg/dL      Creatinine 0.56 mg/dL      Sodium 143 mmol/L      Potassium 3.8 mmol/L      Chloride 106 mmol/L      CO2 29.0 mmol/L      Calcium 8.4 mg/dL      BUN/Creatinine Ratio 21.4     Anion Gap 8.0 mmol/L      eGFR 115.0 mL/min/1.73      Comment: National Kidney Foundation and American Society of Nephrology (ASN) Task Force recommended calculation based on the Chronic Kidney Disease Epidemiology Collaboration (CKD-EPI) equation refit without adjustment for race.       Narrative:      GFR Normal >60  Chronic Kidney Disease <60  Kidney Failure <15      CBC (No Diff) [[id number]]  (Abnormal) Collected: 08/13/22 0424    Specimen: Blood Updated: 08/13/22 0455     WBC 5.39 10*3/mm3      RBC 3.13 10*6/mm3      Hemoglobin 9.6 g/dL      Hematocrit 29.8 %      MCV 95.2 fL      MCH 30.7 pg      MCHC 32.2 g/dL      RDW 12.4 %      RDW-SD 42.7 fl      MPV 9.2 fL      Platelets 313 10*3/mm3     COVID PRE-OP / PRE-PROCEDURE SCREENING ORDER (NO ISOLATION) - Swab, Nasopharynx [[id number]]  (Normal) Collected: 08/12/22 1839    Specimen: Swab from Nasopharynx Updated: 08/12/22 1924    Narrative:      The following orders were created for panel order COVID PRE-OP / PRE-PROCEDURE SCREENING ORDER (NO  ISOLATION) - Swab, Nasopharynx.  Procedure                               Abnormality         Status                     ---------                               -----------         ------                     COVID-19,[facility]HOU[facility]...[[id number]]  Normal              Final result                 Please view results for these tests on the individual orders.    COVID-19,[facility] CEPHEID/VERONIKA NP SWAB IN TRANSPORT MEDIA 8-12 HR TAT - Swab, Nasopharynx [[id number]]  (Normal) Collected: 08/12/22 1839    Specimen: Swab from Nasopharynx Updated: 08/12/22 1924     COVID19 Not Detected    Narrative:      Fact sheet for providers: https://www.fda.gov/media/675065/download     Fact sheet for patients: https://www.fda.gov/media/710845/download    Comprehensive Metabolic Panel [[id number]]  (Abnormal) Collected: 08/12/22 1716    Specimen: Blood Updated: 08/12/22 1815     Glucose 112 mg/dL      BUN 15 mg/dL      Creatinine 0.75 mg/dL      Sodium 139 mmol/L      Potassium 4.0 mmol/L      Chloride 100 mmol/L      CO2 31.4 mmol/L      Calcium 8.6 mg/dL      Total Protein 6.7 g/dL      Albumin 3.20 g/dL      ALT (SGPT) 13 U/L      AST (SGOT) 15 U/L      Alkaline Phosphatase 65 U/L      Total Bilirubin <0.2 mg/dL      Globulin 3.5 gm/dL      A/G Ratio 0.9 g/dL      BUN/Creatinine Ratio 20.0     Anion Gap 7.6 mmol/L      eGFR 105.3 mL/min/1.73      Comment: National Kidney Foundation and American Society of Nephrology (ASN) Task Force recommended calculation based on the Chronic Kidney Disease Epidemiology Collaboration (CKD-EPI) equation refit without adjustment for race.       Narrative:      GFR Normal >60  Chronic Kidney Disease <60  Kidney Failure <15      CBC & Differential [[id number]]  (Abnormal) Collected: 08/12/22 1716    Specimen: Blood Updated: 08/12/22 1731    Narrative:      The following orders were created for panel order CBC & Differential.  Procedure                               Abnormality         Status                      ---------                               -----------         ------                     CBC Auto Differential[[id number]]        Abnormal            Final result                 Please view results for these tests on the individual orders.    CBC Auto Differential [[id number]]  (Abnormal) Collected: 08/12/22 1716    Specimen: Blood Updated: 08/12/22 1731     WBC 6.03 10*3/mm3      RBC 3.23 10*6/mm3      Hemoglobin 10.1 g/dL      Hematocrit 30.1 %      MCV 93.2 fL      MCH 31.3 pg      MCHC 33.6 g/dL      RDW 12.5 %      RDW-SD 42.3 fl      MPV 9.0 fL      Platelets 339 10*3/mm3      Neutrophil % 59.7 %      Lymphocyte % 21.9 %      Monocyte % 11.9 %      Eosinophil % 5.5 %      Basophil % 0.7 %      Immature Grans % 0.3 %      Neutrophils, Absolute 3.60 10*3/mm3      Lymphocytes, Absolute 1.32 10*3/mm3      Monocytes, Absolute 0.72 10*3/mm3      Eosinophils, Absolute 0.33 10*3/mm3      Basophils, Absolute 0.04 10*3/mm3      Immature Grans, Absolute 0.02 10*3/mm3      nRBC 0.0 /100 WBC         Data Review:  Results from last 7 days   Lab Units 08/16/22  0427 08/15/22  0443 08/14/22  0505   SODIUM mmol/L 139 145 142   POTASSIUM mmol/L 3.7 3.8 3.7   CHLORIDE mmol/L 102 107 105   CO2 mmol/L 28.5 28.9 30.0*   BUN mg/dL 21* 14 12   CREATININE mg/dL 0.54* 0.62* 0.58*   GLUCOSE mg/dL 107* 95 87   CALCIUM mg/dL 8.2* 8.3* 8.3*     Results from last 7 days   Lab Units 08/16/22  0427 08/15/22  0443 08/14/22  0505   WBC 10*3/mm3 8.50 6.25 5.05   HEMOGLOBIN g/dL 8.8* 9.5* 9.5*   HEMATOCRIT % 27.7* 30.1* 29.2*   PLATELETS 10*3/mm3 267 271 301             No results found for: TROPONINT      Results from last 7 days   Lab Units 08/12/22 1716   ALK PHOS U/L 65   BILIRUBIN mg/dL <0.2   ALT (SGPT) U/L 13   AST (SGOT) U/L 15             No results found for: POCGLU        Past Medical History:   Diagnosis Date   • Anemia    • Anxiety    • Cataract     bilateral   • Colitis    • Depression    • Disease of thyroid gland    •  Diverticulitis    • Hard to intubate    • Hemorrhoids    • History of BPH    • Hydrocele in adult    • Hyperopia    • Hypertension    • Impulse control disorder    • Seasonal allergies    • Shingles        Assessment:  Active Hospital Problems    Diagnosis  POA   • **Closed displaced transverse fracture of left patella [S82.032A]  Yes   • Anxiety [F41.9]  Unknown   • Depression [F32.A]  Unknown   • Disease of thyroid gland [E07.9]  Unknown   • Hypertension [I10]  Unknown   • History of BPH [Z87.438]  Unknown   • Mental deficiency [F79]  Unknown      Resolved Hospital Problems   No resolved problems to display.     #1 known patellar fracture with patellar tendon tear, underwent ORIF and today's postop day #1.  He had irrigation and debridement of the open patellar fracture and had revision ORIF of patella, patellar tendon repair.  Continue with analgesics and weightbearing as recommended by orthopedics.  Continue with analgesics for pain control and have encouraged him to use incentive spirometry.  2.  Hypertension, will continue with amlodipine, HCTZ.  3.  Hypothyroidism, on Synthroid.  4.  BPH, on Flomax.  5.  History of anxiety, on Ativan.      Hector Worrell MD  8/16/2022  16:56 EDT

## 2022-08-16 NOTE — PLAN OF CARE
Goal Outcome Evaluation:Pt VSS, afebrile, POD0 Left patella ORIF with revision and tendon repair with external fixator, swab sent for Linn Auris (rule out) as pt came from facility in [place], pain controlled with po pain medication. Will cont to monitro.

## 2022-08-16 NOTE — OP NOTE
Revision patella ORIF with patella tendon repair and external fixator application Operative Note  Dr. GRANGER “Huang” Carolina II  [phone]    PATIENT NAME: Lucien Morrison  MRN: [id number]  : 1965 AGE: 57 y.o. 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 17044:  open treatment of patellar fracture  2. 27235: Arthrotomy of knee with removal of foreign body  3. : Debridement of open fracture  4. : Patellar tendon repair  5: Application of multiplane external fixator      DETAILS OF PROCEDURE:  The patient was met in the preoperative area. The site was marked. The consent and H&P were reviewed. The patient was then wheeled back to the operative suite and transferred to the operative table. The patient underwent anesthesia. A tourniquet was placed on the upper thigh. Excess hair along the incision was removed with clippers. Surgical alcohol was used to thoroughly clean the operative area.    The leg was then prepped and draped in the normal sterile fashion, which included Chloroprep and multiple layers of sterile drapes. After a surgical timeout in which administration of preoperative antibiotics and the surgical site were confirmed, the tourniquet was put up.       I began the case by opening his incision.  There was a large 2 cm opening over the patella that was full-thickness.  The previous patella  fracture that was ununited was exposed.  This was an open fracture.  A medial parapatellar arthrotomy was performed at that point a thorough irrigation of the exposed knee joint was performed.  A synovectomy was also performed.  I next performed a debridement of the open patella fracture including using a rongeur and curette for sharp debridement.  After the irrigation and debridement, I assessed the patella tendon which had been avulsed off of the distal pole of the patella.  After removing the remaining fibers, the tendon tissue was cleaned up and then drill holes were passed through the patella through the proximal pole and the distal pole as well.  Two #5 FiberWire sutures were passed through the patellar tendon and then passed through the bone tunnels in the patella.  When these were tightened on the far side, not only did repair the patella tendon but it also better secured the patella fracture.  I did not take down all of the scar tissue in between the pieces of patellar bone as I was afraid this would destabilize the extensor mechanism too much.  Therefore, I elected to reinforce the scar tissue.  Unfortunately, due to the amount of destruction this was far from an anatomic repair.  The goal is to get him the leg that he could actively use to walk.  After the repair,  it was reinforced using a locking #5 FiberWire.  The arthrotomy was then closed using a #2 FiberWire.  The superficial tissues were thoroughly irrigated and the skin edges from the open wound were ellipsed.  I was unable to close the skin in layers.      At that point, I decided to utilize an external fixator.  Due to this patient's mental status challenges, he is unable to protect his knee and has a hard time not falling, not bending the knee, and not utilizing the knee and disobeying restrictions.  It is not his fault, this is simply a issue with his cognition.  I felt that application of an external fixator  would allow for soft tissue rest  "and bony healing.  Therefore, 2 femoral pins and 2 tibial pins were passed and a multiplane external fixator was secured to both the tibia and the femur utilizing these pins.  This allowed for rigid fixation of the construct and soft tissue and bony rest/healing.  The external fixator was tightened and felt to be in good condition.  A sterile dressing was then applied.    Sponge and needle count were correct. There were no complications. Patient tolerated the procedure well.    R \"Huang\" Carolina WHEATLEY MD  Orthopaedic Surgery  [facility]  [phone]              "

## 2022-08-16 NOTE — CASE MANAGEMENT/SOCIAL WORK
Discharge Planning Assessment  [facility]     Patient Name: Lucien Morrison  MRN: [id number]  Today's Date: 8/16/2022    Admit Date: 8/12/2022     Discharge Needs Assessment     Row Name 08/16/22 1150       Living Environment    People in Home other (see comments)    Unique Family Situation Group home/AL.    Current Living Arrangements home    Primary Care Provided by self;homecare agency    Provides Primary Care For no one    Family Caregiver if Needed sibling(s);parent(s)    Quality of Family Relationships helpful;involved;supportive    Able to Return to Prior Arrangements other (see comments)  Plans rehab.       Resource/Environmental Concerns    Transportation Concerns none       Transition Planning    Patient/Family Anticipates Transition to inpatient rehabilitation facility    Patient/Family Anticipated Services at Transition     Transportation Anticipated family or friend will provide;health plan transportation       Discharge Needs Assessment    Readmission Within the Last 30 Days no previous admission in last 30 days    Equipment Currently Used at Home none    Discharge Facility/Level of Care Needs nursing facility, skilled;rehabilitation facility    Provided Post Acute Provider List? Yes    Post Acute Provider List Nursing Home               Discharge Plan     Row Name 08/16/22 7830       Plan    Plan [facility] -- Pending, SNF -- Referrals Pending.    Patient/Family in Agreement with Plan yes    Plan Comments Per Epic documentation, the patient is not fully alert/oriented at this time. Therefore CCP spoke with his brother/Ken, verified current information and explained the role of the CCP. Patient lives in a group home (assisted living) and has family support. He's overall IADL and has no history with DME/HH. He's been to both [facility] & [facility] in the past. Ken said that he and the patient plan for him to d/c to rehab. [facility]. Referrals sent in Epic. [facility] will follow.              Continued Care and Services - Admitted Since 8/12/2022     Destination     Service Provider Request Status Selected Services Address Phone Fax Patient Preferred    [facility]  Pending - [facility] Sent N/A [address] [place] [place] [address] [phone] --    [facility]  Pending - Request Sent N/A [address] [place] [address] [phone] --    [facility]  Pending - Request Sent N/A [address] [facility] [place] [address] [phone] --    [facility]  Pending - Request Sent N/A [address] [facility] [place] [address] [phone] --              Expected Discharge Date and Time     Expected Discharge Date Expected Discharge Time    Aug 18, 2022          Demographic Summary     Row Name 08/16/22 1149       General Information    Admission Type inpatient    Reason for Consult discharge planning    Preferred Language English       Contact Information    Permission Granted to Share Info With ;family/designee               Functional Status     Row Name 08/16/22 1149       Functional Status, IADL    Medications independent;assistive person    Meal Preparation independent    Housekeeping independent    Laundry independent    Shopping independent;assistive person                Mishel NOLASCO RN

## 2022-08-17 LAB
BILIRUB UR QL STRIP: NEGATIVE
CLARITY UR: CLEAR
COLOR UR: YELLOW
GLUCOSE UR STRIP-MCNC: NEGATIVE MG/DL
HGB UR QL STRIP.AUTO: NEGATIVE
KETONES UR QL STRIP: NEGATIVE
LEUKOCYTE ESTERASE UR QL STRIP.AUTO: NEGATIVE
NITRITE UR QL STRIP: NEGATIVE
PH UR STRIP.AUTO: 6.5 [PH] (ref 5–8)
PROT UR QL STRIP: NEGATIVE
SP GR UR STRIP: 1.01 (ref 1–1.03)
UROBILINOGEN UR QL STRIP: NORMAL

## 2022-08-17 PROCEDURE — 25010000002 CEFEPIME PER 500 MG: Performed by: ORTHOPAEDIC SURGERY

## 2022-08-17 PROCEDURE — 81003 URINALYSIS AUTO W/O SCOPE: CPT | Performed by: ORTHOPAEDIC SURGERY

## 2022-08-17 RX ADMIN — HYDROCODONE BITARTRATE AND ACETAMINOPHEN 1 TABLET: 5; 325 TABLET ORAL at 10:34

## 2022-08-17 RX ADMIN — SODIUM CHLORIDE 75 ML/HR: 9 INJECTION, SOLUTION INTRAVENOUS at 19:55

## 2022-08-17 RX ADMIN — OXYBUTYNIN CHLORIDE 5 MG: 5 TABLET, EXTENDED RELEASE ORAL at 21:01

## 2022-08-17 RX ADMIN — Medication: at 21:00

## 2022-08-17 RX ADMIN — HYDROCODONE BITARTRATE AND ACETAMINOPHEN 1 TABLET: 5; 325 TABLET ORAL at 15:24

## 2022-08-17 RX ADMIN — CLONAZEPAM 1 MG: 1 TABLET ORAL at 21:01

## 2022-08-17 RX ADMIN — PALIPERIDONE 12 MG: 6 TABLET, EXTENDED RELEASE ORAL at 12:15

## 2022-08-17 RX ADMIN — ASPIRIN 325 MG: 325 TABLET, COATED ORAL at 09:57

## 2022-08-17 RX ADMIN — ESCITALOPRAM 20 MG: 20 TABLET, FILM COATED ORAL at 09:57

## 2022-08-17 RX ADMIN — Medication: at 09:57

## 2022-08-17 RX ADMIN — LEVOTHYROXINE SODIUM 112 MCG: 0.11 TABLET ORAL at 06:27

## 2022-08-17 RX ADMIN — DOCUSATE SODIUM 100 MG: 100 CAPSULE, LIQUID FILLED ORAL at 09:57

## 2022-08-17 RX ADMIN — HYDROCHLOROTHIAZIDE 12.5 MG: 12.5 TABLET ORAL at 09:57

## 2022-08-17 RX ADMIN — CEFEPIME HYDROCHLORIDE 1 G: 1 INJECTION, POWDER, FOR SOLUTION INTRAMUSCULAR; INTRAVENOUS at 01:24

## 2022-08-17 RX ADMIN — CEFEPIME HYDROCHLORIDE 1 G: 1 INJECTION, POWDER, FOR SOLUTION INTRAMUSCULAR; INTRAVENOUS at 06:27

## 2022-08-17 RX ADMIN — CEFEPIME HYDROCHLORIDE 1 G: 1 INJECTION, POWDER, FOR SOLUTION INTRAMUSCULAR; INTRAVENOUS at 15:40

## 2022-08-17 RX ADMIN — DOCUSATE SODIUM 100 MG: 100 CAPSULE, LIQUID FILLED ORAL at 21:01

## 2022-08-17 RX ADMIN — TAMSULOSIN HYDROCHLORIDE 0.4 MG: 0.4 CAPSULE ORAL at 09:57

## 2022-08-17 RX ADMIN — CLONAZEPAM 1 MG: 1 TABLET ORAL at 09:57

## 2022-08-17 RX ADMIN — OXYBUTYNIN CHLORIDE 5 MG: 5 TABLET, EXTENDED RELEASE ORAL at 09:57

## 2022-08-17 RX ADMIN — AMLODIPINE BESYLATE 5 MG: 5 TABLET ORAL at 09:57

## 2022-08-17 NOTE — PROGRESS NOTES
"DAILY PROGRESS NOTE  [facility]    Patient Identification:  Name: Lucien Morrison  Age: 57 y.o.  Sex: male  :  1965  MRN: [id number]         Primary Care Physician: Ran Bragg MD    Subjective:    Patient is lying on the bed and does not appear to be in any distress.  Mental status appears to be at baseline..  Denies nausea, vomiting, abdominal pain, chest pain.    Objective:    Scheduled Meds:amLODIPine, 5 mg, Oral, Daily  aspirin EC, 325 mg, Oral, Daily  cefepime, 1 g, Intravenous, Q8H  clonazePAM, 1 mg, Oral, BID  docusate sodium, 100 mg, Oral, BID  escitalopram, 20 mg, Oral, Daily  hydroCHLOROthiazide, 12.5 mg, Oral, Daily  levothyroxine, 112 mcg, Oral, QAM  oxybutynin XL, 5 mg, Oral, BID  paliperidone, 12 mg, Oral, Daily  tamsulosin, 0.4 mg, Oral, Daily  zinc oxide, , Topical, BID      Continuous Infusions:lactated ringers, 9 mL/hr, Last Rate: 9 mL/hr (08/15/22 1000)  sodium chloride, 75 mL/hr, Last Rate: 75 mL/hr (22 2202)        Vital signs in last 24 hours:  Temp:  [97 °F (36.1 °C)-98.9 °F (37.2 °C)] 98.5 °F (36.9 °C)  Heart Rate:  [] 95  Resp:  [16] 16  BP: (105-145)/(50-83) 145/50    Intake/Output:    Intake/Output Summary (Last 24 hours) at 2022 1255  Last data filed at 2022 0900  Gross per 24 hour   Intake 480 ml   Output 450 ml   Net 30 ml       Exam:  /50 (BP Location: Right arm, Patient Position: Lying)   Pulse 95   Temp 98.5 °F (36.9 °C) (Oral)   Resp 16   Ht 172.7 cm (68\")   Wt 76.5 kg (168 lb 10.4 oz)   SpO2 98%   BMI 25.64 kg/m²     General Appearance:    Alert, cooperative, no distress   Head:    Normocephalic, without obvious abnormality, atraumatic   Eyes:       Throat:   Lips, tongue, gums normal   Neck:   Supple, symmetrical, trachea midline, no JVD   Lungs:     Clear to auscultation bilaterally, respirations unlabored   Chest Wall:    No tenderness or deformity    Heart:    Regular rate and rhythm, S1 and S2 normal, no murmur,no  " Rub or gallop   Abdomen:     Soft, nontender, bowel sounds active, no masses, no organomegaly    Extremities:   Extremities normal, atraumatic, no cyanosis or edema   Pulses:      Skin:   Skin is warm and dry,  no rashes or palpable lesions   Neurologic:  Mental deficiency      Lab Results (last 72 hours)     Procedure Component Value Units Date/Time    C-reactive Protein [[id number]]  (Abnormal) Collected: 08/13/22 1008    Specimen: Blood Updated: 08/13/22 1132     C-Reactive Protein 1.68 mg/dL     Sedimentation Rate [[id number]]  (Abnormal) Collected: 08/13/22 1008    Specimen: Blood Updated: 08/13/22 1124     Sed Rate 49 mm/hr     Basic Metabolic Panel [[id number]]  (Abnormal) Collected: 08/13/22 0424    Specimen: Blood Updated: 08/13/22 0527     Glucose 95 mg/dL      BUN 12 mg/dL      Creatinine 0.56 mg/dL      Sodium 143 mmol/L      Potassium 3.8 mmol/L      Chloride 106 mmol/L      CO2 29.0 mmol/L      Calcium 8.4 mg/dL      BUN/Creatinine Ratio 21.4     Anion Gap 8.0 mmol/L      eGFR 115.0 mL/min/1.73      Comment: National Kidney Foundation and American Society of Nephrology (ASN) Task Force recommended calculation based on the Chronic Kidney Disease Epidemiology Collaboration (CKD-EPI) equation refit without adjustment for race.       Narrative:      GFR Normal >60  Chronic Kidney Disease <60  Kidney Failure <15      CBC (No Diff) [[id number]]  (Abnormal) Collected: 08/13/22 0424    Specimen: Blood Updated: 08/13/22 0455     WBC 5.39 10*3/mm3      RBC 3.13 10*6/mm3      Hemoglobin 9.6 g/dL      Hematocrit 29.8 %      MCV 95.2 fL      MCH 30.7 pg      MCHC 32.2 g/dL      RDW 12.4 %      RDW-SD 42.7 fl      MPV 9.2 fL      Platelets 313 10*3/mm3     COVID PRE-OP / PRE-PROCEDURE SCREENING ORDER (NO ISOLATION) - Swab, Nasopharynx [[id number]]  (Normal) Collected: 08/12/22 1839    Specimen: Swab from Nasopharynx Updated: 08/12/22 1924    Narrative:      The following orders were created for panel order COVID PRE-OP /  PRE-PROCEDURE SCREENING ORDER (NO ISOLATION) - Swab, Nasopharynx.  Procedure                               Abnormality         Status                     ---------                               -----------         ------                     COVID-19,[facility]...[[id number]]  Normal              Final result                 Please view results for these tests on the individual orders.    COVID-19,[facility] CEPHEID/VERONIKA NP SWAB IN TRANSPORT MEDIA 8-12 HR TAT - Swab, Nasopharynx [[id number]]  (Normal) Collected: 08/12/22 1839    Specimen: Swab from Nasopharynx Updated: 08/12/22 1924     COVID19 Not Detected    Narrative:      Fact sheet for providers: https://www.fda.gov/media/402310/download     Fact sheet for patients: https://www.fda.gov/media/463933/download    Comprehensive Metabolic Panel [[id number]]  (Abnormal) Collected: 08/12/22 1716    Specimen: Blood Updated: 08/12/22 1815     Glucose 112 mg/dL      BUN 15 mg/dL      Creatinine 0.75 mg/dL      Sodium 139 mmol/L      Potassium 4.0 mmol/L      Chloride 100 mmol/L      CO2 31.4 mmol/L      Calcium 8.6 mg/dL      Total Protein 6.7 g/dL      Albumin 3.20 g/dL      ALT (SGPT) 13 U/L      AST (SGOT) 15 U/L      Alkaline Phosphatase 65 U/L      Total Bilirubin <0.2 mg/dL      Globulin 3.5 gm/dL      A/G Ratio 0.9 g/dL      BUN/Creatinine Ratio 20.0     Anion Gap 7.6 mmol/L      eGFR 105.3 mL/min/1.73      Comment: National Kidney Foundation and American Society of Nephrology (ASN) Task Force recommended calculation based on the Chronic Kidney Disease Epidemiology Collaboration (CKD-EPI) equation refit without adjustment for race.       Narrative:      GFR Normal >60  Chronic Kidney Disease <60  Kidney Failure <15      CBC & Differential [[id number]]  (Abnormal) Collected: 08/12/22 1716    Specimen: Blood Updated: 08/12/22 1731    Narrative:      The following orders were created for panel order CBC & Differential.  Procedure                                Abnormality         Status                     ---------                               -----------         ------                     CBC Auto Differential[[id number]]        Abnormal            Final result                 Please view results for these tests on the individual orders.    CBC Auto Differential [[id number]]  (Abnormal) Collected: 08/12/22 1716    Specimen: Blood Updated: 08/12/22 1731     WBC 6.03 10*3/mm3      RBC 3.23 10*6/mm3      Hemoglobin 10.1 g/dL      Hematocrit 30.1 %      MCV 93.2 fL      MCH 31.3 pg      MCHC 33.6 g/dL      RDW 12.5 %      RDW-SD 42.3 fl      MPV 9.0 fL      Platelets 339 10*3/mm3      Neutrophil % 59.7 %      Lymphocyte % 21.9 %      Monocyte % 11.9 %      Eosinophil % 5.5 %      Basophil % 0.7 %      Immature Grans % 0.3 %      Neutrophils, Absolute 3.60 10*3/mm3      Lymphocytes, Absolute 1.32 10*3/mm3      Monocytes, Absolute 0.72 10*3/mm3      Eosinophils, Absolute 0.33 10*3/mm3      Basophils, Absolute 0.04 10*3/mm3      Immature Grans, Absolute 0.02 10*3/mm3      nRBC 0.0 /100 WBC         Data Review:  Results from last 7 days   Lab Units 08/16/22  0427 08/15/22  0443 08/14/22  0505   SODIUM mmol/L 139 145 142   POTASSIUM mmol/L 3.7 3.8 3.7   CHLORIDE mmol/L 102 107 105   CO2 mmol/L 28.5 28.9 30.0*   BUN mg/dL 21* 14 12   CREATININE mg/dL 0.54* 0.62* 0.58*   GLUCOSE mg/dL 107* 95 87   CALCIUM mg/dL 8.2* 8.3* 8.3*     Results from last 7 days   Lab Units 08/16/22  0427 08/15/22  0443 08/14/22  0505   WBC 10*3/mm3 8.50 6.25 5.05   HEMOGLOBIN g/dL 8.8* 9.5* 9.5*   HEMATOCRIT % 27.7* 30.1* 29.2*   PLATELETS 10*3/mm3 267 271 301             No results found for: TROPONINT      Results from last 7 days   Lab Units 08/12/22  1716   ALK PHOS U/L 65   BILIRUBIN mg/dL <0.2   ALT (SGPT) U/L 13   AST (SGOT) U/L 15             No results found for: POCGLU        Past Medical History:   Diagnosis Date   • Anemia    • Anxiety    • Cataract     bilateral   • Colitis    • Depression     • Disease of thyroid gland    • Diverticulitis    • Hard to intubate    • Hemorrhoids    • History of BPH    • Hydrocele in adult    • Hyperopia    • Hypertension    • Impulse control disorder    • Seasonal allergies    • Shingles        Assessment:  Active Hospital Problems    Diagnosis  POA   • **Closed displaced transverse fracture of left patella [S82.032A]  Yes   • Anxiety [F41.9]  Unknown   • Depression [F32.A]  Unknown   • Disease of thyroid gland [E07.9]  Unknown   • Hypertension [I10]  Unknown   • History of BPH [Z87.438]  Unknown   • Mental deficiency [F79]  Unknown      Resolved Hospital Problems   No resolved problems to display.     #1 known patellar fracture with patellar tendon tear, underwent ORIF and today's postop day #2.  He had irrigation and debridement of the open patellar fracture and had revision ORIF of patella, patellar tendon repair.  Continue with analgesics and weightbearing as recommended by orthopedics.  Continue with analgesics for pain control and have encouraged him to use incentive spirometry.  2.  Hypertension, will continue with amlodipine, HCTZ.  3.  Hypothyroidism, on Synthroid.  4.  BPH, on Flomax.  5.  History of anxiety, on Ativan.  6.  Developmental delay with episodes of agitation for which patient is on Invega.    Hector Worrell MD  8/17/2022  12:55 EDT

## 2022-08-18 RX ADMIN — ESCITALOPRAM 20 MG: 20 TABLET, FILM COATED ORAL at 08:34

## 2022-08-18 RX ADMIN — TAMSULOSIN HYDROCHLORIDE 0.4 MG: 0.4 CAPSULE ORAL at 08:34

## 2022-08-18 RX ADMIN — SODIUM CHLORIDE 75 ML/HR: 9 INJECTION, SOLUTION INTRAVENOUS at 12:00

## 2022-08-18 RX ADMIN — Medication: at 08:35

## 2022-08-18 RX ADMIN — DOCUSATE SODIUM 100 MG: 100 CAPSULE, LIQUID FILLED ORAL at 08:34

## 2022-08-18 RX ADMIN — OXYBUTYNIN CHLORIDE 5 MG: 5 TABLET, EXTENDED RELEASE ORAL at 08:34

## 2022-08-18 RX ADMIN — LEVOTHYROXINE SODIUM 112 MCG: 0.11 TABLET ORAL at 08:34

## 2022-08-18 RX ADMIN — OXYBUTYNIN CHLORIDE 5 MG: 5 TABLET, EXTENDED RELEASE ORAL at 20:07

## 2022-08-18 RX ADMIN — PALIPERIDONE 12 MG: 6 TABLET, EXTENDED RELEASE ORAL at 08:34

## 2022-08-18 RX ADMIN — CLONAZEPAM 1 MG: 1 TABLET ORAL at 20:07

## 2022-08-18 RX ADMIN — DOCUSATE SODIUM 100 MG: 100 CAPSULE, LIQUID FILLED ORAL at 20:07

## 2022-08-18 RX ADMIN — CLONAZEPAM 1 MG: 1 TABLET ORAL at 08:34

## 2022-08-18 RX ADMIN — HYDROCODONE BITARTRATE AND ACETAMINOPHEN 1 TABLET: 5; 325 TABLET ORAL at 19:35

## 2022-08-18 RX ADMIN — ASPIRIN 325 MG: 325 TABLET, COATED ORAL at 08:33

## 2022-08-18 RX ADMIN — Medication: at 20:07

## 2022-08-18 RX ADMIN — AMLODIPINE BESYLATE 5 MG: 5 TABLET ORAL at 08:34

## 2022-08-18 RX ADMIN — HYDROCHLOROTHIAZIDE 12.5 MG: 12.5 TABLET ORAL at 08:34

## 2022-08-18 NOTE — CASE MANAGEMENT/SOCIAL WORK
Continued Stay Note  [facility]     Patient Name: Lucien Morrison  MRN: [id number]  Today's Date: 8/18/2022    Admit Date: 8/12/2022     Discharge Plan     Row Name 08/18/22 1433       Plan    Plan SNF -- Pending.    Patient/Family in Agreement with Plan yes    Plan Comments [facility], [facility] are all unable to accept the patient. CCP called the patient's brother/Ken and left a message. Spoke with his mother/Christina who asked CCP to discuss all d/c planning with Ken. Await Ken's call back for further SNF choices.               Discharge Codes    No documentation.               Expected Discharge Date and Time     Expected Discharge Date Expected Discharge Time    Aug 18, 2022             Mishel NOLASCO RN

## 2022-08-18 NOTE — PROGRESS NOTES
"DAILY PROGRESS NOTE  [facility]    Patient Identification:  Name: Lucien Morrison  Age: 57 y.o.  Sex: male  :  1965  MRN: [id number]         Primary Care Physician: Ran Bragg MD    Subjective:    Patient is lying on the bed and does not appear to be in any distress.  Mental status appears to be at baseline..  Denies nausea, vomiting, abdominal pain, chest pain.    Objective:    Scheduled Meds:amLODIPine, 5 mg, Oral, Daily  aspirin EC, 325 mg, Oral, Daily  clonazePAM, 1 mg, Oral, BID  docusate sodium, 100 mg, Oral, BID  escitalopram, 20 mg, Oral, Daily  hydroCHLOROthiazide, 12.5 mg, Oral, Daily  levothyroxine, 112 mcg, Oral, QAM  oxybutynin XL, 5 mg, Oral, BID  paliperidone, 12 mg, Oral, Daily  tamsulosin, 0.4 mg, Oral, Daily  zinc oxide, , Topical, BID      Continuous Infusions:lactated ringers, 9 mL/hr, Last Rate: 9 mL/hr (08/15/22 1000)  sodium chloride, 75 mL/hr, Last Rate: 75 mL/hr (22 1200)        Vital signs in last 24 hours:  Temp:  [97 °F (36.1 °C)-97.9 °F (36.6 °C)] 97 °F (36.1 °C)  Heart Rate:  [80-90] 80  Resp:  [16] 16  BP: (136-156)/(68-87) 145/87    Intake/Output:    Intake/Output Summary (Last 24 hours) at 2022 1706  Last data filed at 2022 1324  Gross per 24 hour   Intake 770 ml   Output 500 ml   Net 270 ml       Exam:  /87 (BP Location: Right arm, Patient Position: Lying)   Pulse 80   Temp 97 °F (36.1 °C) (Oral)   Resp 16   Ht 172.7 cm (68\")   Wt 76.5 kg (168 lb 10.4 oz)   SpO2 90%   BMI 25.64 kg/m²     General Appearance:    Alert, cooperative, no distress   Head:    Normocephalic, without obvious abnormality, atraumatic   Eyes:       Throat:   Lips, tongue, gums normal   Neck:   Supple, symmetrical, trachea midline, no JVD   Lungs:     Clear to auscultation bilaterally, respirations unlabored   Chest Wall:    No tenderness or deformity    Heart:    Regular rate and rhythm, S1 and S2 normal, no murmur,no  Rub or gallop   Abdomen:     Soft, " nontender, bowel sounds active, no masses, no organomegaly    Extremities:   Extremities normal, atraumatic, no cyanosis or edema, external hardware noted on the left leg.   Pulses:      Skin:   Skin is warm and dry,  no rashes or palpable lesions   Neurologic:  Mental deficiency      Lab Results (last 72 hours)     Procedure Component Value Units Date/Time    C-reactive Protein [[id number]]  (Abnormal) Collected: 08/13/22 1008    Specimen: Blood Updated: 08/13/22 1132     C-Reactive Protein 1.68 mg/dL     Sedimentation Rate [[id number]]  (Abnormal) Collected: 08/13/22 1008    Specimen: Blood Updated: 08/13/22 1124     Sed Rate 49 mm/hr     Basic Metabolic Panel [[id number]]  (Abnormal) Collected: 08/13/22 0424    Specimen: Blood Updated: 08/13/22 0527     Glucose 95 mg/dL      BUN 12 mg/dL      Creatinine 0.56 mg/dL      Sodium 143 mmol/L      Potassium 3.8 mmol/L      Chloride 106 mmol/L      CO2 29.0 mmol/L      Calcium 8.4 mg/dL      BUN/Creatinine Ratio 21.4     Anion Gap 8.0 mmol/L      eGFR 115.0 mL/min/1.73      Comment: National Kidney Foundation and American Society of Nephrology (ASN) Task Force recommended calculation based on the Chronic Kidney Disease Epidemiology Collaboration (CKD-EPI) equation refit without adjustment for race.       Narrative:      GFR Normal >60  Chronic Kidney Disease <60  Kidney Failure <15      CBC (No Diff) [[id number]]  (Abnormal) Collected: 08/13/22 0424    Specimen: Blood Updated: 08/13/22 0455     WBC 5.39 10*3/mm3      RBC 3.13 10*6/mm3      Hemoglobin 9.6 g/dL      Hematocrit 29.8 %      MCV 95.2 fL      MCH 30.7 pg      MCHC 32.2 g/dL      RDW 12.4 %      RDW-SD 42.7 fl      MPV 9.2 fL      Platelets 313 10*3/mm3     COVID PRE-OP / PRE-PROCEDURE SCREENING ORDER (NO ISOLATION) - Swab, Nasopharynx [[id number]]  (Normal) Collected: 08/12/22 1839    Specimen: Swab from Nasopharynx Updated: 08/12/22 1924    Narrative:      The following orders were created for panel order COVID  PRE-OP / PRE-PROCEDURE SCREENING ORDER (NO ISOLATION) - Swab, Nasopharynx.  Procedure                               Abnormality         Status                     ---------                               -----------         ------                     COVID-19,[facility]...[[id number]]  Normal              Final result                 Please view results for these tests on the individual orders.    COVID-19,[facility] CEPHEID/VERONIKA NP SWAB IN TRANSPORT MEDIA 8-12 HR TAT - Swab, Nasopharynx [[id number]]  (Normal) Collected: 08/12/22 1839    Specimen: Swab from Nasopharynx Updated: 08/12/22 1924     COVID19 Not Detected    Narrative:      Fact sheet for providers: https://www.fda.gov/media/689294/download     Fact sheet for patients: https://www.fda.gov/media/370529/download    Comprehensive Metabolic Panel [[id number]]  (Abnormal) Collected: 08/12/22 1716    Specimen: Blood Updated: 08/12/22 1815     Glucose 112 mg/dL      BUN 15 mg/dL      Creatinine 0.75 mg/dL      Sodium 139 mmol/L      Potassium 4.0 mmol/L      Chloride 100 mmol/L      CO2 31.4 mmol/L      Calcium 8.6 mg/dL      Total Protein 6.7 g/dL      Albumin 3.20 g/dL      ALT (SGPT) 13 U/L      AST (SGOT) 15 U/L      Alkaline Phosphatase 65 U/L      Total Bilirubin <0.2 mg/dL      Globulin 3.5 gm/dL      A/G Ratio 0.9 g/dL      BUN/Creatinine Ratio 20.0     Anion Gap 7.6 mmol/L      eGFR 105.3 mL/min/1.73      Comment: National Kidney Foundation and American Society of Nephrology (ASN) Task Force recommended calculation based on the Chronic Kidney Disease Epidemiology Collaboration (CKD-EPI) equation refit without adjustment for race.       Narrative:      GFR Normal >60  Chronic Kidney Disease <60  Kidney Failure <15      CBC & Differential [[id number]]  (Abnormal) Collected: 08/12/22 1716    Specimen: Blood Updated: 08/12/22 1731    Narrative:      The following orders were created for panel order CBC & Differential.  Procedure                                Abnormality         Status                     ---------                               -----------         ------                     CBC Auto Differential[[id number]]        Abnormal            Final result                 Please view results for these tests on the individual orders.    CBC Auto Differential [[id number]]  (Abnormal) Collected: 08/12/22 1716    Specimen: Blood Updated: 08/12/22 1731     WBC 6.03 10*3/mm3      RBC 3.23 10*6/mm3      Hemoglobin 10.1 g/dL      Hematocrit 30.1 %      MCV 93.2 fL      MCH 31.3 pg      MCHC 33.6 g/dL      RDW 12.5 %      RDW-SD 42.3 fl      MPV 9.0 fL      Platelets 339 10*3/mm3      Neutrophil % 59.7 %      Lymphocyte % 21.9 %      Monocyte % 11.9 %      Eosinophil % 5.5 %      Basophil % 0.7 %      Immature Grans % 0.3 %      Neutrophils, Absolute 3.60 10*3/mm3      Lymphocytes, Absolute 1.32 10*3/mm3      Monocytes, Absolute 0.72 10*3/mm3      Eosinophils, Absolute 0.33 10*3/mm3      Basophils, Absolute 0.04 10*3/mm3      Immature Grans, Absolute 0.02 10*3/mm3      nRBC 0.0 /100 WBC         Data Review:  Results from last 7 days   Lab Units 08/16/22  0427 08/15/22  0443 08/14/22  0505   SODIUM mmol/L 139 145 142   POTASSIUM mmol/L 3.7 3.8 3.7   CHLORIDE mmol/L 102 107 105   CO2 mmol/L 28.5 28.9 30.0*   BUN mg/dL 21* 14 12   CREATININE mg/dL 0.54* 0.62* 0.58*   GLUCOSE mg/dL 107* 95 87   CALCIUM mg/dL 8.2* 8.3* 8.3*     Results from last 7 days   Lab Units 08/16/22  0427 08/15/22  0443 08/14/22  0505   WBC 10*3/mm3 8.50 6.25 5.05   HEMOGLOBIN g/dL 8.8* 9.5* 9.5*   HEMATOCRIT % 27.7* 30.1* 29.2*   PLATELETS 10*3/mm3 267 271 301             No results found for: TROPONINT      Results from last 7 days   Lab Units 08/12/22  1716   ALK PHOS U/L 65   BILIRUBIN mg/dL <0.2   ALT (SGPT) U/L 13   AST (SGOT) U/L 15             No results found for: POCGLU        Past Medical History:   Diagnosis Date   • Anemia    • Anxiety    • Cataract     bilateral   • Colitis    •  Depression    • Disease of thyroid gland    • Diverticulitis    • Hard to intubate    • Hemorrhoids    • History of BPH    • Hydrocele in adult    • Hyperopia    • Hypertension    • Impulse control disorder    • Seasonal allergies    • Shingles        Assessment:  Active Hospital Problems    Diagnosis  POA   • **Closed displaced transverse fracture of left patella [S82.032A]  Yes   • Anxiety [F41.9]  Unknown   • Depression [F32.A]  Unknown   • Disease of thyroid gland [E07.9]  Unknown   • Hypertension [I10]  Unknown   • History of BPH [Z87.438]  Unknown   • Mental deficiency [F79]  Unknown      Resolved Hospital Problems   No resolved problems to display.     #1 known patellar fracture with patellar tendon tear, underwent ORIF and today's postop day #3.  He had irrigation and debridement of the open patellar fracture and had revision ORIF of patella, patellar tendon repair.  Continue with analgesics and weightbearing as recommended by orthopedics.  Continue with analgesics for pain control and have encouraged him to use incentive spirometry.    2.  Hypertension, will continue with amlodipine, HCTZ.    3.  Hypothyroidism, on Synthroid.    4.  BPH, on Flomax.    5.  History of anxiety, on Ativan.    6.  Developmental delay with episodes of agitation for which patient is on Invega.    7.  Disposition, to rehab once cleared by orthopedics.    Hector Worrell MD  8/18/2022  17:06 EDT

## 2022-08-18 NOTE — PLAN OF CARE
Goal Outcome Evaluation:         Patient POD 2 patella ORIF revision & tendon repair w/ external fixator, with serosanguinous drainage from pin sites,kerlix changed, NWB, with midline, dressing change due on 8/22.Remains on contact isolation to r/o Candida auris. Called Dr Worrell if urine still needed for urinalysis & c/s as previously ordered,verified yes, and urine submitted to lab .Patient confused, on room air, on IV ABT, SCD on good leg, on PRN pain meds.SNF referrals pending, needs attended.

## 2022-08-18 NOTE — PLAN OF CARE
Goal Outcome Evaluation:      Pt POD3 ORIF of left patella revision and tendon repair with external fixator, pin sites wrapped in kerlex NWB to left leg.  Pt VSS, voiding per jeferson.   Awaiting placement at SNF. PT to remain in ISO until 8/20 when Linn Auris will be resulted.

## 2022-08-18 NOTE — CASE MANAGEMENT/SOCIAL WORK
Continued Stay Note  [facility]     Patient Name: Lucien Morrison  MRN: [id number]  Today's Date: 8/18/2022    Admit Date: 8/12/2022     Discharge Plan     Row Name 08/18/22 1558       Plan    Plan SNF -- Pending.    Patient/Family in Agreement with Plan yes    Plan Comments Recieved a call back from the patient's brother/Ken and updated him on the pt's d/c plan so far. He requests referrals to [facility] & [facility]. Referrals sent in Epic. Await SNFs determinations.    Row Name 08/18/22 1678       Plan    Plan SNF -- Pending.    Patient/Family in Agreement with Plan yes    Plan Comments [facility], [facility] & [place] are all unable to accept the patient. CCP called the patient's brother/Ken and left a message. Spoke with his mother/Christina who asked CCP to discuss all d/c planning with Ken. Await Ken's call back for further SNF choices.               Discharge Codes    No documentation.               Expected Discharge Date and Time     Expected Discharge Date Expected Discharge Time    Aug 18, 2022             Mishel NOLASCO RN

## 2022-08-18 NOTE — PLAN OF CARE
Goal Outcome Evaluation: POD#3 OF ORIF OF LEFT PETELLA REVISION AND TENDON REPAIR WITH EXTERNAL FIXATOR. PIN SITES WRAPPED IN KERLIX. NWB TO LEFT LEG. PATIENT TAKES PILLS WHOLE WITH WATER. PATIENT IS CONFUSED. VOIDING PER TL. PATIENT IN CONTACT ISOLATION FOR R/O CANDIDA AURIS. EDUCATION PROVIDED ON PAIN CONTROL. NO EVIDENCE OF LAERNING. WILL CONTINUE TO MONITOR.

## 2022-08-19 RX ADMIN — Medication: at 20:35

## 2022-08-19 RX ADMIN — ESCITALOPRAM 20 MG: 20 TABLET, FILM COATED ORAL at 09:15

## 2022-08-19 RX ADMIN — PALIPERIDONE 12 MG: 6 TABLET, EXTENDED RELEASE ORAL at 09:16

## 2022-08-19 RX ADMIN — TAMSULOSIN HYDROCHLORIDE 0.4 MG: 0.4 CAPSULE ORAL at 09:15

## 2022-08-19 RX ADMIN — HYDROCODONE BITARTRATE AND ACETAMINOPHEN 1 TABLET: 5; 325 TABLET ORAL at 20:34

## 2022-08-19 RX ADMIN — DOCUSATE SODIUM 100 MG: 100 CAPSULE, LIQUID FILLED ORAL at 20:34

## 2022-08-19 RX ADMIN — AMLODIPINE BESYLATE 5 MG: 5 TABLET ORAL at 09:15

## 2022-08-19 RX ADMIN — CLONAZEPAM 1 MG: 1 TABLET ORAL at 09:13

## 2022-08-19 RX ADMIN — HYDROCODONE BITARTRATE AND ACETAMINOPHEN 1 TABLET: 5; 325 TABLET ORAL at 11:15

## 2022-08-19 RX ADMIN — DOCUSATE SODIUM 100 MG: 100 CAPSULE, LIQUID FILLED ORAL at 09:13

## 2022-08-19 RX ADMIN — OXYBUTYNIN CHLORIDE 5 MG: 5 TABLET, EXTENDED RELEASE ORAL at 20:34

## 2022-08-19 RX ADMIN — HYDROCHLOROTHIAZIDE 12.5 MG: 12.5 TABLET ORAL at 09:15

## 2022-08-19 RX ADMIN — SODIUM CHLORIDE 75 ML/HR: 9 INJECTION, SOLUTION INTRAVENOUS at 12:05

## 2022-08-19 RX ADMIN — LEVOTHYROXINE SODIUM 112 MCG: 0.11 TABLET ORAL at 09:15

## 2022-08-19 RX ADMIN — OXYBUTYNIN CHLORIDE 5 MG: 5 TABLET, EXTENDED RELEASE ORAL at 09:15

## 2022-08-19 RX ADMIN — ASPIRIN 325 MG: 325 TABLET, COATED ORAL at 09:15

## 2022-08-19 RX ADMIN — Medication: at 09:14

## 2022-08-19 NOTE — CASE MANAGEMENT/SOCIAL WORK
Continued Stay Note  [facility]     Patient Name: Lucien Morrison  MRN: [id number]  Today's Date: 8/19/2022    Admit Date: 8/12/2022     Discharge Plan     Row Name 08/19/22 1317       Plan    Plan SNF referral pending    Patient/Family in Agreement with Plan yes    Plan Comments Spoke with [facility], he reports both facilities are still reviewing referral and he will f/u with CCP.               Discharge Codes    No documentation.               Expected Discharge Date and Time     Expected Discharge Date Expected Discharge Time    Aug 20, 2022             JANESSA Traore

## 2022-08-19 NOTE — PROGRESS NOTES
"DAILY PROGRESS NOTE  [facility]    Patient Identification:  Name: Lucien Morrison  Age: 57 y.o.  Sex: male  :  1965  MRN: [id number]         Primary Care Physician: Ran Bragg MD    Subjective:    Patient is lying on the bed and does not appear to be in any distress.  Mental status appears to be at baseline..  Denies nausea, vomiting, abdominal pain, chest pain.    Objective:    Scheduled Meds:amLODIPine, 5 mg, Oral, Daily  aspirin EC, 325 mg, Oral, Daily  clonazePAM, 1 mg, Oral, BID  docusate sodium, 100 mg, Oral, BID  escitalopram, 20 mg, Oral, Daily  hydroCHLOROthiazide, 12.5 mg, Oral, Daily  levothyroxine, 112 mcg, Oral, QAM  oxybutynin XL, 5 mg, Oral, BID  paliperidone, 12 mg, Oral, Daily  tamsulosin, 0.4 mg, Oral, Daily  zinc oxide, , Topical, BID      Continuous Infusions:lactated ringers, 9 mL/hr, Last Rate: 9 mL/hr (08/15/22 1000)  sodium chloride, 75 mL/hr, Last Rate: 75 mL/hr (22 1205)        Vital signs in last 24 hours:  Temp:  [96.8 °F (36 °C)-97.8 °F (36.6 °C)] 97.8 °F (36.6 °C)  Heart Rate:  [80-99] 99  Resp:  [16] 16  BP: (109-154)/(65-82) 135/65    Intake/Output:    Intake/Output Summary (Last 24 hours) at 2022 1627  Last data filed at 2022 1523  Gross per 24 hour   Intake 1580 ml   Output 1450 ml   Net 130 ml       Exam:  /65 (BP Location: Right arm, Patient Position: Lying)   Pulse 99   Temp 97.8 °F (36.6 °C) (Oral)   Resp 16   Ht 172.7 cm (68\")   Wt 76.5 kg (168 lb 10.4 oz)   SpO2 98%   BMI 25.64 kg/m²     General Appearance:    Alert, cooperative, no distress   Head:    Normocephalic, without obvious abnormality, atraumatic   Eyes:       Throat:   Lips, tongue, gums normal   Neck:   Supple, symmetrical, trachea midline, no JVD   Lungs:     Clear to auscultation bilaterally, respirations unlabored   Chest Wall:    No tenderness or deformity    Heart:    Regular rate and rhythm, S1 and S2 normal, no murmur,no  Rub or gallop   Abdomen:     " Soft, nontender, bowel sounds active, no masses, no organomegaly    Extremities:   Extremities normal, atraumatic, no cyanosis or edema, external hardware noted on the left leg.   Pulses:      Skin:   Skin is warm and dry,  no rashes or palpable lesions   Neurologic:  Mental deficiency      Lab Results (last 72 hours)     Procedure Component Value Units Date/Time    C-reactive Protein [[id number]]  (Abnormal) Collected: 08/13/22 1008    Specimen: Blood Updated: 08/13/22 1132     C-Reactive Protein 1.68 mg/dL     Sedimentation Rate [[id number]]  (Abnormal) Collected: 08/13/22 1008    Specimen: Blood Updated: 08/13/22 1124     Sed Rate 49 mm/hr     Basic Metabolic Panel [[id number]]  (Abnormal) Collected: 08/13/22 0424    Specimen: Blood Updated: 08/13/22 0527     Glucose 95 mg/dL      BUN 12 mg/dL      Creatinine 0.56 mg/dL      Sodium 143 mmol/L      Potassium 3.8 mmol/L      Chloride 106 mmol/L      CO2 29.0 mmol/L      Calcium 8.4 mg/dL      BUN/Creatinine Ratio 21.4     Anion Gap 8.0 mmol/L      eGFR 115.0 mL/min/1.73      Comment: National Kidney Foundation and American Society of Nephrology (ASN) Task Force recommended calculation based on the Chronic Kidney Disease Epidemiology Collaboration (CKD-EPI) equation refit without adjustment for race.       Narrative:      GFR Normal >60  Chronic Kidney Disease <60  Kidney Failure <15      CBC (No Diff) [[id number]]  (Abnormal) Collected: 08/13/22 0424    Specimen: Blood Updated: 08/13/22 0455     WBC 5.39 10*3/mm3      RBC 3.13 10*6/mm3      Hemoglobin 9.6 g/dL      Hematocrit 29.8 %      MCV 95.2 fL      MCH 30.7 pg      MCHC 32.2 g/dL      RDW 12.4 %      RDW-SD 42.7 fl      MPV 9.2 fL      Platelets 313 10*3/mm3     COVID PRE-OP / PRE-PROCEDURE SCREENING ORDER (NO ISOLATION) - Swab, Nasopharynx [[id number]]  (Normal) Collected: 08/12/22 1839    Specimen: Swab from Nasopharynx Updated: 08/12/22 1924    Narrative:      The following orders were created for panel order  COVID PRE-OP / PRE-PROCEDURE SCREENING ORDER (NO ISOLATION) - Swab, Nasopharynx.  Procedure                               Abnormality         Status                     ---------                               -----------         ------                     COVID-19,[facility]...[[id number]]  Normal              Final result                 Please view results for these tests on the individual orders.    COVID-19,[facility] CEPHEID/VERONIKA NP SWAB IN TRANSPORT MEDIA 8-12 HR TAT - Swab, Nasopharynx [[id number]]  (Normal) Collected: 08/12/22 1839    Specimen: Swab from Nasopharynx Updated: 08/12/22 1924     COVID19 Not Detected    Narrative:      Fact sheet for providers: https://www.fda.gov/media/870152/download     Fact sheet for patients: https://www.fda.gov/media/591879/download    Comprehensive Metabolic Panel [[id number]]  (Abnormal) Collected: 08/12/22 1716    Specimen: Blood Updated: 08/12/22 1815     Glucose 112 mg/dL      BUN 15 mg/dL      Creatinine 0.75 mg/dL      Sodium 139 mmol/L      Potassium 4.0 mmol/L      Chloride 100 mmol/L      CO2 31.4 mmol/L      Calcium 8.6 mg/dL      Total Protein 6.7 g/dL      Albumin 3.20 g/dL      ALT (SGPT) 13 U/L      AST (SGOT) 15 U/L      Alkaline Phosphatase 65 U/L      Total Bilirubin <0.2 mg/dL      Globulin 3.5 gm/dL      A/G Ratio 0.9 g/dL      BUN/Creatinine Ratio 20.0     Anion Gap 7.6 mmol/L      eGFR 105.3 mL/min/1.73      Comment: National Kidney Foundation and American Society of Nephrology (ASN) Task Force recommended calculation based on the Chronic Kidney Disease Epidemiology Collaboration (CKD-EPI) equation refit without adjustment for race.       Narrative:      GFR Normal >60  Chronic Kidney Disease <60  Kidney Failure <15      CBC & Differential [[id number]]  (Abnormal) Collected: 08/12/22 1716    Specimen: Blood Updated: 08/12/22 1731    Narrative:      The following orders were created for panel order CBC & Differential.  Procedure                                Abnormality         Status                     ---------                               -----------         ------                     CBC Auto Differential[[id number]]        Abnormal            Final result                 Please view results for these tests on the individual orders.    CBC Auto Differential [[id number]]  (Abnormal) Collected: 08/12/22 1716    Specimen: Blood Updated: 08/12/22 1731     WBC 6.03 10*3/mm3      RBC 3.23 10*6/mm3      Hemoglobin 10.1 g/dL      Hematocrit 30.1 %      MCV 93.2 fL      MCH 31.3 pg      MCHC 33.6 g/dL      RDW 12.5 %      RDW-SD 42.3 fl      MPV 9.0 fL      Platelets 339 10*3/mm3      Neutrophil % 59.7 %      Lymphocyte % 21.9 %      Monocyte % 11.9 %      Eosinophil % 5.5 %      Basophil % 0.7 %      Immature Grans % 0.3 %      Neutrophils, Absolute 3.60 10*3/mm3      Lymphocytes, Absolute 1.32 10*3/mm3      Monocytes, Absolute 0.72 10*3/mm3      Eosinophils, Absolute 0.33 10*3/mm3      Basophils, Absolute 0.04 10*3/mm3      Immature Grans, Absolute 0.02 10*3/mm3      nRBC 0.0 /100 WBC         Data Review:  Results from last 7 days   Lab Units 08/16/22  0427 08/15/22  0443 08/14/22  0505   SODIUM mmol/L 139 145 142   POTASSIUM mmol/L 3.7 3.8 3.7   CHLORIDE mmol/L 102 107 105   CO2 mmol/L 28.5 28.9 30.0*   BUN mg/dL 21* 14 12   CREATININE mg/dL 0.54* 0.62* 0.58*   GLUCOSE mg/dL 107* 95 87   CALCIUM mg/dL 8.2* 8.3* 8.3*     Results from last 7 days   Lab Units 08/16/22  0427 08/15/22  0443 08/14/22  0505   WBC 10*3/mm3 8.50 6.25 5.05   HEMOGLOBIN g/dL 8.8* 9.5* 9.5*   HEMATOCRIT % 27.7* 30.1* 29.2*   PLATELETS 10*3/mm3 267 271 301             No results found for: TROPONINT      Results from last 7 days   Lab Units 08/12/22  1716   ALK PHOS U/L 65   BILIRUBIN mg/dL <0.2   ALT (SGPT) U/L 13   AST (SGOT) U/L 15             No results found for: POCGLU        Past Medical History:   Diagnosis Date   • Anemia    • Anxiety    • Cataract     bilateral   • Colitis    •  Depression    • Disease of thyroid gland    • Diverticulitis    • Hard to intubate    • Hemorrhoids    • History of BPH    • Hydrocele in adult    • Hyperopia    • Hypertension    • Impulse control disorder    • Seasonal allergies    • Shingles        Assessment:  Active Hospital Problems    Diagnosis  POA   • **Closed displaced transverse fracture of left patella [S82.032A]  Yes   • Anxiety [F41.9]  Unknown   • Depression [F32.A]  Unknown   • Disease of thyroid gland [E07.9]  Unknown   • Hypertension [I10]  Unknown   • History of BPH [Z87.438]  Unknown   • Mental deficiency [F79]  Unknown      Resolved Hospital Problems   No resolved problems to display.     1. known patellar fracture with patellar tendon tear, underwent ORIF and today's postop day #4.  He had irrigation and debridement of the open patellar fracture and had revision ORIF of patella, patellar tendon repair.   Continue with analgesics for pain control and have encouraged him to use incentive spirometry.    2.  Hypertension, will continue with amlodipine, HCTZ.    3.  Hypothyroidism, on Synthroid.    4.  BPH, on Flomax.    5.  History of anxiety, on Ativan.    6.  Developmental delay with episodes of agitation for which patient is on Invega.    7.  Disposition, to rehab once cleared by orthopedics.    Hector Worrell MD  8/19/2022  16:27 EDT

## 2022-08-19 NOTE — PLAN OF CARE
Goal Outcome Evaluation:   POD#4 OF ORIF OF LEFT PATELLA REVISION AND TENDON REPAIR WITH EXTERNAL FIXATOR. PIN SITES CLEAN AND DRY. NWB TO  LEFT LEG. VOIDING FINE. PO PAIN MEDICATION HELP[ING WITH PAIN. PATIENT IN CONTACT ISOLATION FOR RULE OUT CANDIDA AURIS. PATIENT IS CONFUSED. EDUCATION PROVIDED ON PAIN CONTROL AND SAFETY. WILL CONTINUE TO MONITOR.

## 2022-08-19 NOTE — PLAN OF CARE
Goal Outcome Evaluation:  Plan of Care Reviewed With: patient, sibling        Progress: no change    Pt is 57/M POD#4 of left patella revision with external fixator. Alert to self. Dressing C/D/I. VSS. Contact isolation maintained. PRN pain meds.. Q2 turns. Plan is for rehab, pending. Voiding per external cath. Bowel movement this shift. Will continue to monitor.

## 2022-08-20 LAB — BACTERIA ISLT: NORMAL

## 2022-08-20 RX ADMIN — ESCITALOPRAM 20 MG: 20 TABLET, FILM COATED ORAL at 09:30

## 2022-08-20 RX ADMIN — PALIPERIDONE 12 MG: 6 TABLET, EXTENDED RELEASE ORAL at 09:30

## 2022-08-20 RX ADMIN — HYDROCODONE BITARTRATE AND ACETAMINOPHEN 1 TABLET: 5; 325 TABLET ORAL at 10:48

## 2022-08-20 RX ADMIN — DOCUSATE SODIUM 100 MG: 100 CAPSULE, LIQUID FILLED ORAL at 09:30

## 2022-08-20 RX ADMIN — SODIUM CHLORIDE 75 ML/HR: 9 INJECTION, SOLUTION INTRAVENOUS at 05:42

## 2022-08-20 RX ADMIN — Medication: at 20:26

## 2022-08-20 RX ADMIN — OXYBUTYNIN CHLORIDE 5 MG: 5 TABLET, EXTENDED RELEASE ORAL at 20:26

## 2022-08-20 RX ADMIN — LEVOTHYROXINE SODIUM 112 MCG: 0.11 TABLET ORAL at 05:42

## 2022-08-20 RX ADMIN — HYDROCODONE BITARTRATE AND ACETAMINOPHEN 1 TABLET: 5; 325 TABLET ORAL at 05:42

## 2022-08-20 RX ADMIN — SODIUM CHLORIDE 75 ML/HR: 9 INJECTION, SOLUTION INTRAVENOUS at 20:24

## 2022-08-20 RX ADMIN — AMLODIPINE BESYLATE 5 MG: 5 TABLET ORAL at 09:30

## 2022-08-20 RX ADMIN — Medication: at 09:31

## 2022-08-20 RX ADMIN — ASPIRIN 325 MG: 325 TABLET, COATED ORAL at 09:30

## 2022-08-20 RX ADMIN — OXYBUTYNIN CHLORIDE 5 MG: 5 TABLET, EXTENDED RELEASE ORAL at 09:31

## 2022-08-20 RX ADMIN — HYDROCHLOROTHIAZIDE 12.5 MG: 12.5 TABLET ORAL at 09:31

## 2022-08-20 RX ADMIN — HYDROCODONE BITARTRATE AND ACETAMINOPHEN 1 TABLET: 5; 325 TABLET ORAL at 20:26

## 2022-08-20 RX ADMIN — TAMSULOSIN HYDROCHLORIDE 0.4 MG: 0.4 CAPSULE ORAL at 09:31

## 2022-08-20 NOTE — PLAN OF CARE
Goal Outcome Evaluation:               Baseline oriented, hx of cogitative delay. Pleasant. Voiding per purewick/brief. Pain controlled. Plans to D/C to SNF when ready. Will cont to monitor.

## 2022-08-20 NOTE — PLAN OF CARE
Goal Outcome Evaluation:   Patient POD 5, VSS. Dressing clean dry and intact. Pain controlled with PO medication. Voiding via purewick. Turns Q2. Referrals sent for SNF placement at [facility], awaiting acceptance. WCTM.

## 2022-08-20 NOTE — PROGRESS NOTES
"DAILY PROGRESS NOTE  [facility]    Patient Identification:  Name: Lucien Morrison  Age: 57 y.o.  Sex: male  :  1965  MRN: [id number]         Primary Care Physician: Ran Bragg MD    Subjective:    Patient is lying on the bed and does not appear to be in any distress.  Mental status appears to be at baseline..  Denies nausea, vomiting, abdominal pain, chest pain.    Objective:    Scheduled Meds:amLODIPine, 5 mg, Oral, Daily  aspirin EC, 325 mg, Oral, Daily  docusate sodium, 100 mg, Oral, BID  escitalopram, 20 mg, Oral, Daily  hydroCHLOROthiazide, 12.5 mg, Oral, Daily  levothyroxine, 112 mcg, Oral, QAM  oxybutynin XL, 5 mg, Oral, BID  paliperidone, 12 mg, Oral, Daily  tamsulosin, 0.4 mg, Oral, Daily  zinc oxide, , Topical, BID      Continuous Infusions:lactated ringers, 9 mL/hr, Last Rate: 9 mL/hr (08/15/22 1000)  sodium chloride, 75 mL/hr, Last Rate: 75 mL/hr (22 0542)        Vital signs in last 24 hours:  Temp:  [97 °F (36.1 °C)-98.2 °F (36.8 °C)] 97 °F (36.1 °C)  Heart Rate:  [76-84] 76  Resp:  [16] 16  BP: (129-151)/(77-88) 151/88    Intake/Output:    Intake/Output Summary (Last 24 hours) at 2022 1500  Last data filed at 2022 1300  Gross per 24 hour   Intake 585 ml   Output 1050 ml   Net -465 ml       Exam:  /88 (BP Location: Right arm, Patient Position: Lying)   Pulse 76   Temp 97 °F (36.1 °C) (Oral)   Resp 16   Ht 172.7 cm (68\")   Wt 76.5 kg (168 lb 10.4 oz)   SpO2 91%   BMI 25.64 kg/m²     General Appearance:    Alert, cooperative, no distress   Head:    Normocephalic, without obvious abnormality, atraumatic   Eyes:       Throat:   Lips, tongue, gums normal   Neck:   Supple, symmetrical, trachea midline, no JVD   Lungs:     Clear to auscultation bilaterally, respirations unlabored   Chest Wall:    No tenderness or deformity    Heart:    Regular rate and rhythm, S1 and S2 normal, no murmur,no  Rub or gallop   Abdomen:     Soft, nontender, bowel sounds " active, no masses, no organomegaly    Extremities:   Extremities normal, atraumatic, no cyanosis or edema, external hardware noted on the left leg.   Pulses:      Skin:   Skin is warm and dry,  no rashes or palpable lesions   Neurologic:  Mental deficiency      Lab Results (last 72 hours)     Procedure Component Value Units Date/Time    C-reactive Protein [[id number]]  (Abnormal) Collected: 08/13/22 1008    Specimen: Blood Updated: 08/13/22 1132     C-Reactive Protein 1.68 mg/dL     Sedimentation Rate [[id number]]  (Abnormal) Collected: 08/13/22 1008    Specimen: Blood Updated: 08/13/22 1124     Sed Rate 49 mm/hr     Basic Metabolic Panel [[id number]]  (Abnormal) Collected: 08/13/22 0424    Specimen: Blood Updated: 08/13/22 0527     Glucose 95 mg/dL      BUN 12 mg/dL      Creatinine 0.56 mg/dL      Sodium 143 mmol/L      Potassium 3.8 mmol/L      Chloride 106 mmol/L      CO2 29.0 mmol/L      Calcium 8.4 mg/dL      BUN/Creatinine Ratio 21.4     Anion Gap 8.0 mmol/L      eGFR 115.0 mL/min/1.73      Comment: National Kidney Foundation and American Society of Nephrology (ASN) Task Force recommended calculation based on the Chronic Kidney Disease Epidemiology Collaboration (CKD-EPI) equation refit without adjustment for race.       Narrative:      GFR Normal >60  Chronic Kidney Disease <60  Kidney Failure <15      CBC (No Diff) [[id number]]  (Abnormal) Collected: 08/13/22 0424    Specimen: Blood Updated: 08/13/22 0455     WBC 5.39 10*3/mm3      RBC 3.13 10*6/mm3      Hemoglobin 9.6 g/dL      Hematocrit 29.8 %      MCV 95.2 fL      MCH 30.7 pg      MCHC 32.2 g/dL      RDW 12.4 %      RDW-SD 42.7 fl      MPV 9.2 fL      Platelets 313 10*3/mm3     COVID PRE-OP / PRE-PROCEDURE SCREENING ORDER (NO ISOLATION) - Swab, Nasopharynx [[id number]]  (Normal) Collected: 08/12/22 1839    Specimen: Swab from Nasopharynx Updated: 08/12/22 1924    Narrative:      The following orders were created for panel order COVID PRE-OP / PRE-PROCEDURE  SCREENING ORDER (NO ISOLATION) - Swab, Nasopharynx.  Procedure                               Abnormality         Status                     ---------                               -----------         ------                     COVID-19,[facility]...[[id number]]  Normal              Final result                 Please view results for these tests on the individual orders.    COVID-19,[facility] CEPHEID/VERONIKA NP SWAB IN TRANSPORT MEDIA 8-12 HR TAT - Swab, Nasopharynx [[id number]]  (Normal) Collected: 08/12/22 1839    Specimen: Swab from Nasopharynx Updated: 08/12/22 1924     COVID19 Not Detected    Narrative:      Fact sheet for providers: https://www.fda.gov/media/339165/download     Fact sheet for patients: https://www.fda.gov/media/768177/download    Comprehensive Metabolic Panel [[id number]]  (Abnormal) Collected: 08/12/22 1716    Specimen: Blood Updated: 08/12/22 1815     Glucose 112 mg/dL      BUN 15 mg/dL      Creatinine 0.75 mg/dL      Sodium 139 mmol/L      Potassium 4.0 mmol/L      Chloride 100 mmol/L      CO2 31.4 mmol/L      Calcium 8.6 mg/dL      Total Protein 6.7 g/dL      Albumin 3.20 g/dL      ALT (SGPT) 13 U/L      AST (SGOT) 15 U/L      Alkaline Phosphatase 65 U/L      Total Bilirubin <0.2 mg/dL      Globulin 3.5 gm/dL      A/G Ratio 0.9 g/dL      BUN/Creatinine Ratio 20.0     Anion Gap 7.6 mmol/L      eGFR 105.3 mL/min/1.73      Comment: National Kidney Foundation and American Society of Nephrology (ASN) Task Force recommended calculation based on the Chronic Kidney Disease Epidemiology Collaboration (CKD-EPI) equation refit without adjustment for race.       Narrative:      GFR Normal >60  Chronic Kidney Disease <60  Kidney Failure <15      CBC & Differential [[id number]]  (Abnormal) Collected: 08/12/22 1716    Specimen: Blood Updated: 08/12/22 1731    Narrative:      The following orders were created for panel order CBC & Differential.  Procedure                               Abnormality          Status                     ---------                               -----------         ------                     CBC Auto Differential[[id number]]        Abnormal            Final result                 Please view results for these tests on the individual orders.    CBC Auto Differential [[id number]]  (Abnormal) Collected: 08/12/22 1716    Specimen: Blood Updated: 08/12/22 1731     WBC 6.03 10*3/mm3      RBC 3.23 10*6/mm3      Hemoglobin 10.1 g/dL      Hematocrit 30.1 %      MCV 93.2 fL      MCH 31.3 pg      MCHC 33.6 g/dL      RDW 12.5 %      RDW-SD 42.3 fl      MPV 9.0 fL      Platelets 339 10*3/mm3      Neutrophil % 59.7 %      Lymphocyte % 21.9 %      Monocyte % 11.9 %      Eosinophil % 5.5 %      Basophil % 0.7 %      Immature Grans % 0.3 %      Neutrophils, Absolute 3.60 10*3/mm3      Lymphocytes, Absolute 1.32 10*3/mm3      Monocytes, Absolute 0.72 10*3/mm3      Eosinophils, Absolute 0.33 10*3/mm3      Basophils, Absolute 0.04 10*3/mm3      Immature Grans, Absolute 0.02 10*3/mm3      nRBC 0.0 /100 WBC         Data Review:  Results from last 7 days   Lab Units 08/16/22  0427 08/15/22  0443 08/14/22  0505   SODIUM mmol/L 139 145 142   POTASSIUM mmol/L 3.7 3.8 3.7   CHLORIDE mmol/L 102 107 105   CO2 mmol/L 28.5 28.9 30.0*   BUN mg/dL 21* 14 12   CREATININE mg/dL 0.54* 0.62* 0.58*   GLUCOSE mg/dL 107* 95 87   CALCIUM mg/dL 8.2* 8.3* 8.3*     Results from last 7 days   Lab Units 08/16/22  0427 08/15/22  0443 08/14/22  0505   WBC 10*3/mm3 8.50 6.25 5.05   HEMOGLOBIN g/dL 8.8* 9.5* 9.5*   HEMATOCRIT % 27.7* 30.1* 29.2*   PLATELETS 10*3/mm3 267 271 301             No results found for: TROPONINT            Invalid input(s): PROT, LABALBU          No results found for: POCGLU        Past Medical History:   Diagnosis Date   • Anemia    • Anxiety    • Cataract     bilateral   • Colitis    • Depression    • Disease of thyroid gland    • Diverticulitis    • Hard to intubate    • Hemorrhoids    • History of BPH    •  Hydrocele in adult    • Hyperopia    • Hypertension    • Impulse control disorder    • Seasonal allergies    • Shingles        Assessment:  Active Hospital Problems    Diagnosis  POA   • **Closed displaced transverse fracture of left patella [S82.032A]  Yes   • Anxiety [F41.9]  Unknown   • Depression [F32.A]  Unknown   • Disease of thyroid gland [E07.9]  Unknown   • Hypertension [I10]  Unknown   • History of BPH [Z87.438]  Unknown   • Mental deficiency [F79]  Unknown      Resolved Hospital Problems   No resolved problems to display.     1. known patellar fracture with patellar tendon tear, underwent ORIF and today's postop day #5.  He had irrigation and debridement of the open patellar fracture and had revision ORIF of patella, patellar tendon repair.   Continue with analgesics for pain control and have encouraged him to use incentive spirometry.    2.  Hypertension, will continue with amlodipine, HCTZ.    3.  Hypothyroidism, on Synthroid.    4.  BPH, on Flomax.    5.  History of anxiety, on Ativan.    6.  Developmental delay with episodes of agitation for which patient is on Invega.    7.  Disposition, to rehab once cleared by orthopedics.    Hector Worrell MD  8/20/2022  15:00 EDT

## 2022-08-21 RX ADMIN — DOCUSATE SODIUM 100 MG: 100 CAPSULE, LIQUID FILLED ORAL at 08:11

## 2022-08-21 RX ADMIN — HYDROCODONE BITARTRATE AND ACETAMINOPHEN 1 TABLET: 5; 325 TABLET ORAL at 00:16

## 2022-08-21 RX ADMIN — TAMSULOSIN HYDROCHLORIDE 0.4 MG: 0.4 CAPSULE ORAL at 08:11

## 2022-08-21 RX ADMIN — OXYBUTYNIN CHLORIDE 5 MG: 5 TABLET, EXTENDED RELEASE ORAL at 20:21

## 2022-08-21 RX ADMIN — AMLODIPINE BESYLATE 5 MG: 5 TABLET ORAL at 08:11

## 2022-08-21 RX ADMIN — ESCITALOPRAM 20 MG: 20 TABLET, FILM COATED ORAL at 08:11

## 2022-08-21 RX ADMIN — Medication: at 08:11

## 2022-08-21 RX ADMIN — HYDROCODONE BITARTRATE AND ACETAMINOPHEN 1 TABLET: 5; 325 TABLET ORAL at 20:21

## 2022-08-21 RX ADMIN — DOCUSATE SODIUM 100 MG: 100 CAPSULE, LIQUID FILLED ORAL at 20:21

## 2022-08-21 RX ADMIN — ASPIRIN 325 MG: 325 TABLET, COATED ORAL at 08:11

## 2022-08-21 RX ADMIN — PALIPERIDONE 12 MG: 6 TABLET, EXTENDED RELEASE ORAL at 08:10

## 2022-08-21 RX ADMIN — OXYBUTYNIN CHLORIDE 5 MG: 5 TABLET, EXTENDED RELEASE ORAL at 08:11

## 2022-08-21 RX ADMIN — LEVOTHYROXINE SODIUM 112 MCG: 0.11 TABLET ORAL at 06:18

## 2022-08-21 RX ADMIN — HYDROCODONE BITARTRATE AND ACETAMINOPHEN 1 TABLET: 5; 325 TABLET ORAL at 08:11

## 2022-08-21 RX ADMIN — HYDROCHLOROTHIAZIDE 12.5 MG: 12.5 TABLET ORAL at 08:11

## 2022-08-21 RX ADMIN — Medication 3 MG: at 20:21

## 2022-08-21 NOTE — PLAN OF CARE
Goal Outcome Evaluation:         Baseline cognition, pleasant. External fixator intact. Voiding per jeferson/brief. BM today. Plans for D/C to SNF when ready. Will cont to monitor.

## 2022-08-21 NOTE — PROGRESS NOTES
Name: Lucien Morrison ADMIT: 2022   : 1965  PCP: Ran Bragg MD    MRN: [id number] LOS: 9 days   AGE/SEX: 57 y.o. male  ROOM: [facility]     Subjective   Subjective     No events overnight. Mental status at baseline. He has no complaints.       Objective   Objective   Vital Signs  Temp:  [95.6 °F (35.3 °C)-98.2 °F (36.8 °C)] 98 °F (36.7 °C)  Heart Rate:  [] 88  Resp:  [16] 16  BP: (132-155)/(68-89) 151/82  SpO2:  [91 %-98 %] 91 %  on  Flow (L/min):  [2] 2;   Device (Oxygen Therapy): nasal cannula  Body mass index is 25.64 kg/m².  Physical Exam  Constitutional:       General: He is not in acute distress.     Appearance: He is not toxic-appearing.   Cardiovascular:      Rate and Rhythm: Normal rate and regular rhythm.   Pulmonary:      Effort: Pulmonary effort is normal.      Breath sounds: Normal breath sounds.   Abdominal:      General: Bowel sounds are normal.      Palpations: Abdomen is soft.   Musculoskeletal:      Comments: External fixator over left knee   Neurological:      Mental Status: He is alert.   Psychiatric:         Mood and Affect: Mood normal.         Behavior: Behavior normal.         Results Review     I reviewed the patient's new clinical results.  Results from last 7 days   Lab Units 08/16/22  0427 08/15/22  0443   WBC 10*3/mm3 8.50 6.25   HEMOGLOBIN g/dL 8.8* 9.5*   PLATELETS 10*3/mm3 267 271     Results from last 7 days   Lab Units 227 08/15/22  0443   SODIUM mmol/L 139 145   POTASSIUM mmol/L 3.7 3.8   CHLORIDE mmol/L 102 107   CO2 mmol/L 28.5 28.9   BUN mg/dL 21* 14   CREATININE mg/dL 0.54* 0.62*   GLUCOSE mg/dL 107* 95   Estimated Creatinine Clearance: 163.3 mL/min (A) (by C-G formula based on SCr of 0.54 mg/dL (L)).    Results from last 7 days   Lab Units 08/16/22  0427 08/15/22  0443   CALCIUM mg/dL 8.2* 8.3*       COVID19   Date Value Ref Range Status   08/15/2022 Not Detected Not Detected - Ref. Range Final   2022 Not Detected Not Detected - Ref.  Range Final   07/31/2022 Not Detected Not Detected - Ref. Range Final   01/18/2022 Detected (C) Not Detected - Ref. Range Final     No results found for: HGBA1C, POCGLU    MRI Knee Left With & Without Contrast  Narrative: Patient: LUNA PEDRO  Time Out: 19:49  Exam(s): MRI LEFT KNEE W WO Contrast     EXAM:    MR Left Lower Extremity Without and With Intravenous Contrast, Knee    CLINICAL HISTORY:     Reason for exam: Infection, patellar fracture nonunion with   displacement, and possible patellar tendon injury.    TECHNIQUE:    Multiplanar magnetic resonance images of the left knee without and with   intravenous contrast.    COMPARISON:    No relevant prior studies available.    FINDINGS:   BONES JOINTS CARTILAGE:    Patellofemoral compartment:  Comminuted fracture of the patella with   extensive displacement.  Proximal patellar tendon tear with 3 cm   retraction.  Partial tear of the distal quadriceps tendon.    Femorotibial compartments:  Unremarkable.          Medial meniscus:  Unremarkable.  No tear.    Lateral meniscus:  Unremarkable.  No tear.    Medial capsule supporting structures:  Unremarkable.  Normal medial   collateral ligament.    Lateral capsule supporting structures:  Unremarkable.  Normal lateral   collateral ligament.    Anterior cruciate ligament:  Unremarkable.    Posterior cruciate ligament:  Unremarkable.    Musculature:  Unremarkable.       IMPRESSION:       1.  Severely comminuted and displaced patellar fracture.  2.  Complete patellar tendon tear and extensive superior displacement of   the patellar fracture fragments.  Impression: Electronically signed by Gokul Wei MD on 08-13-22 at 1949    Scheduled Medications  amLODIPine, 5 mg, Oral, Daily  aspirin EC, 325 mg, Oral, Daily  docusate sodium, 100 mg, Oral, BID  escitalopram, 20 mg, Oral, Daily  hydroCHLOROthiazide, 12.5 mg, Oral, Daily  levothyroxine, 112 mcg, Oral, QAM  oxybutynin XL, 5 mg, Oral, BID  paliperidone, 12 mg, Oral,  Daily  tamsulosin, 0.4 mg, Oral, Daily  zinc oxide, , Topical, BID    Infusions  lactated ringers, 9 mL/hr, Last Rate: 9 mL/hr (08/15/22 1000)  sodium chloride, 75 mL/hr, Last Rate: 75 mL/hr (08/20/22 2024)    Diet  Diet Regular       Assessment/Plan     Active Hospital Problems    Diagnosis  POA   • **Closed displaced transverse fracture of left patella [S82.032A]  Yes   • Anxiety [F41.9]  Unknown   • Depression [F32.A]  Unknown   • Disease of thyroid gland [E07.9]  Unknown   • Hypertension [I10]  Unknown   • History of BPH [Z87.438]  Unknown   • Mental deficiency [F79]  Unknown      Resolved Hospital Problems   No resolved problems to display.       57 y.o. male admitted with Closed displaced transverse fracture of left patella.    · Patellar fracture with patellar tendon tear-s/p left knee irrigation debridement, debridement of the open petella fracture, revision ORIF of the patella, patellar tendon repair, and application of a multiplane external fixator by Dr. Figueroa on 8/15/22.  · Anemia-had a little post operative anemia and last hgb is 8.8 but appears to be chronically low as well. Will check some iron studies, ferritin, b12, folate, and repeat cbc tomorrow since it hasn't been checked in several days.   · Essential hypertension-adequately controlled acutely  · Hypothyroidism-synthroid  · BPH-tamsulosin  · Anxiety-ativan  · Developmental delay with episodes of agitation-invega  · SCDs for DVT prophylaxis.  · Full code.  · Discussed with nursing staff.  · Anticipate discharge to SNU facility once arrangements have been made.      Gilmer Fitzgerald MD  [facility]  08/21/22  14:41 EDT    I wore protective equipment throughout this patient encounter including a face mask, gloves and protective eyewear.  Hand hygiene was performed before donning protective equipment and after removal when leaving the room.

## 2022-08-21 NOTE — PLAN OF CARE
Goal Outcome Evaluation:  Plan of Care Reviewed With: patient        Progress: no change  Outcome Evaluation: Pt remains stable. Ex fix to LLE is cdi. Skin protection measures in place. Confused but cooperative. Voiding per PW. Having BMs in brief. Norco given for pain with relief. Fluids infusing via midline to ERICK. Referrals sent for SNF upon dc. COREY.

## 2022-08-22 LAB
DEPRECATED RDW RBC AUTO: 44.1 FL (ref 37–54)
ERYTHROCYTE [DISTWIDTH] IN BLOOD BY AUTOMATED COUNT: 12.7 % (ref 12.3–15.4)
FERRITIN SERPL-MCNC: 63.4 NG/ML (ref 30–400)
FOLATE SERPL-MCNC: 19.1 NG/ML (ref 4.78–24.2)
HCT VFR BLD AUTO: 25.2 % (ref 37.5–51)
HGB BLD-MCNC: 7.9 G/DL (ref 13–17.7)
IRON 24H UR-MRATE: 26 MCG/DL (ref 59–158)
IRON SATN MFR SERPL: 9 % (ref 20–50)
MCH RBC QN AUTO: 30 PG (ref 26.6–33)
MCHC RBC AUTO-ENTMCNC: 31.3 G/DL (ref 31.5–35.7)
MCV RBC AUTO: 95.8 FL (ref 79–97)
PLATELET # BLD AUTO: 304 10*3/MM3 (ref 140–450)
PMV BLD AUTO: 9.4 FL (ref 6–12)
RBC # BLD AUTO: 2.63 10*6/MM3 (ref 4.14–5.8)
SARS-COV-2 ORF1AB RESP QL NAA+PROBE: NOT DETECTED
TIBC SERPL-MCNC: 285 MCG/DL (ref 298–536)
TRANSFERRIN SERPL-MCNC: 191 MG/DL (ref 200–360)
VIT B12 BLD-MCNC: 596 PG/ML (ref 211–946)
WBC NRBC COR # BLD: 6.96 10*3/MM3 (ref 3.4–10.8)

## 2022-08-22 PROCEDURE — 82746 ASSAY OF FOLIC ACID SERUM: CPT | Performed by: STUDENT IN AN ORGANIZED HEALTH CARE EDUCATION/TRAINING PROGRAM

## 2022-08-22 PROCEDURE — U0005 INFEC AGEN DETEC AMPLI PROBE: HCPCS | Performed by: STUDENT IN AN ORGANIZED HEALTH CARE EDUCATION/TRAINING PROGRAM

## 2022-08-22 PROCEDURE — U0004 COV-19 TEST NON-CDC HGH THRU: HCPCS | Performed by: STUDENT IN AN ORGANIZED HEALTH CARE EDUCATION/TRAINING PROGRAM

## 2022-08-22 PROCEDURE — 83540 ASSAY OF IRON: CPT | Performed by: STUDENT IN AN ORGANIZED HEALTH CARE EDUCATION/TRAINING PROGRAM

## 2022-08-22 PROCEDURE — 82607 VITAMIN B-12: CPT | Performed by: STUDENT IN AN ORGANIZED HEALTH CARE EDUCATION/TRAINING PROGRAM

## 2022-08-22 PROCEDURE — 82728 ASSAY OF FERRITIN: CPT | Performed by: STUDENT IN AN ORGANIZED HEALTH CARE EDUCATION/TRAINING PROGRAM

## 2022-08-22 PROCEDURE — 84466 ASSAY OF TRANSFERRIN: CPT | Performed by: STUDENT IN AN ORGANIZED HEALTH CARE EDUCATION/TRAINING PROGRAM

## 2022-08-22 PROCEDURE — 85027 COMPLETE CBC AUTOMATED: CPT | Performed by: STUDENT IN AN ORGANIZED HEALTH CARE EDUCATION/TRAINING PROGRAM

## 2022-08-22 RX ORDER — FERROUS SULFATE 325(65) MG
325 TABLET ORAL
Status: DISCONTINUED | OUTPATIENT
Start: 2022-08-22 | End: 2022-08-23 | Stop reason: HOSPADM

## 2022-08-22 RX ADMIN — Medication: at 20:38

## 2022-08-22 RX ADMIN — Medication: at 01:08

## 2022-08-22 RX ADMIN — HYDROCODONE BITARTRATE AND ACETAMINOPHEN 1 TABLET: 5; 325 TABLET ORAL at 18:40

## 2022-08-22 RX ADMIN — ACETAMINOPHEN 650 MG: 325 TABLET, FILM COATED ORAL at 09:47

## 2022-08-22 RX ADMIN — DOCUSATE SODIUM 100 MG: 100 CAPSULE, LIQUID FILLED ORAL at 20:35

## 2022-08-22 RX ADMIN — ASPIRIN 325 MG: 325 TABLET, COATED ORAL at 09:48

## 2022-08-22 RX ADMIN — PALIPERIDONE 12 MG: 6 TABLET, EXTENDED RELEASE ORAL at 09:47

## 2022-08-22 RX ADMIN — AMLODIPINE BESYLATE 5 MG: 5 TABLET ORAL at 09:48

## 2022-08-22 RX ADMIN — LEVOTHYROXINE SODIUM 112 MCG: 0.11 TABLET ORAL at 09:47

## 2022-08-22 RX ADMIN — FERROUS SULFATE TAB 325 MG (65 MG ELEMENTAL FE) 325 MG: 325 (65 FE) TAB at 12:46

## 2022-08-22 RX ADMIN — OXYBUTYNIN CHLORIDE 5 MG: 5 TABLET, EXTENDED RELEASE ORAL at 20:35

## 2022-08-22 RX ADMIN — Medication: at 09:24

## 2022-08-22 RX ADMIN — ESCITALOPRAM 20 MG: 20 TABLET, FILM COATED ORAL at 09:48

## 2022-08-22 RX ADMIN — DOCUSATE SODIUM 100 MG: 100 CAPSULE, LIQUID FILLED ORAL at 09:48

## 2022-08-22 RX ADMIN — OXYBUTYNIN CHLORIDE 5 MG: 5 TABLET, EXTENDED RELEASE ORAL at 09:47

## 2022-08-22 RX ADMIN — HYDROCHLOROTHIAZIDE 12.5 MG: 12.5 TABLET ORAL at 09:47

## 2022-08-22 RX ADMIN — TAMSULOSIN HYDROCHLORIDE 0.4 MG: 0.4 CAPSULE ORAL at 09:47

## 2022-08-22 NOTE — PLAN OF CARE
Goal Outcome Evaluation: Baseline cognition, pleasant. External fixator intact. Pain medication administered this shift and effective patient voiding per jeferson/brief. Two person extensive assist with bed mobility, incontinent care at this time, continue to monitor and encourage fluids

## 2022-08-22 NOTE — CASE MANAGEMENT/SOCIAL WORK
Continued Stay Note  [facility]     Patient Name: Lucien Morrison  MRN: [id number]  Today's Date: 8/22/2022    Admit Date: 8/12/2022     Discharge Plan     Row Name 08/22/22 1217       Plan    Plan SNF -- Pending.    Patient/Family in Agreement with Plan yes    Plan Comments [facility] called and left a message for Ovi to f/u on referrals for [facility]. Await his call back.               Discharge Codes    No documentation.               Expected Discharge Date and Time     Expected Discharge Date Expected Discharge Time    Aug 20, 2022             Mishel NOLASCO RN

## 2022-08-22 NOTE — PROGRESS NOTES
Name: Lucien Morrison ADMIT: 2022   : 1965  PCP: Ran Bragg MD    MRN: [id number] LOS: 10 days   AGE/SEX: 57 y.o. male  ROOM: [facility]     Subjective   Subjective     No events overnight. Mental status at baseline. He has no complaints.       Objective   Objective   Vital Signs  Temp:  [97 °F (36.1 °C)-97.9 °F (36.6 °C)] 97.1 °F (36.2 °C)  Heart Rate:  [78-95] 95  Resp:  [16] 16  BP: (113-146)/(59-80) 146/72  SpO2:  [89 %-93 %] 93 %  on   ;   Device (Oxygen Therapy): room air  Body mass index is 25.64 kg/m².  Physical Exam  Constitutional:       General: He is not in acute distress.     Appearance: He is not toxic-appearing.   Cardiovascular:      Rate and Rhythm: Normal rate and regular rhythm.   Pulmonary:      Effort: Pulmonary effort is normal.      Breath sounds: Normal breath sounds.   Abdominal:      General: Bowel sounds are normal.      Palpations: Abdomen is soft.   Musculoskeletal:      Comments: External fixator over left knee   Neurological:      Mental Status: He is alert.   Psychiatric:         Mood and Affect: Mood normal.         Behavior: Behavior normal.         Results Review     I reviewed the patient's new clinical results.  Results from last 7 days   Lab Units 22  0439 22  0427   WBC 10*3/mm3 6.96 8.50   HEMOGLOBIN g/dL 7.9* 8.8*   PLATELETS 10*3/mm3 304 267     Results from last 7 days   Lab Units 22  0427   SODIUM mmol/L 139   POTASSIUM mmol/L 3.7   CHLORIDE mmol/L 102   CO2 mmol/L 28.5   BUN mg/dL 21*   CREATININE mg/dL 0.54*   GLUCOSE mg/dL 107*   Estimated Creatinine Clearance: 163.3 mL/min (A) (by C-G formula based on SCr of 0.54 mg/dL (L)).    Results from last 7 days   Lab Units 22  0427   CALCIUM mg/dL 8.2*       COVID19   Date Value Ref Range Status   08/15/2022 Not Detected Not Detected - Ref. Range Final   2022 Not Detected Not Detected - Ref. Range Final   2022 Not Detected Not Detected - Ref. Range Final   2022 Detected  (C) Not Detected - Ref. Range Final     No results found for: HGBA1C, POCGLU    MRI Knee Left With & Without Contrast  Narrative: Patient: LUNA PEDRO  Time Out: 19:49  Exam(s): MRI LEFT KNEE W WO Contrast     EXAM:    MR Left Lower Extremity Without and With Intravenous Contrast, Knee    CLINICAL HISTORY:     Reason for exam: Infection, patellar fracture nonunion with   displacement, and possible patellar tendon injury.    TECHNIQUE:    Multiplanar magnetic resonance images of the left knee without and with   intravenous contrast.    COMPARISON:    No relevant prior studies available.    FINDINGS:   BONES JOINTS CARTILAGE:    Patellofemoral compartment:  Comminuted fracture of the patella with   extensive displacement.  Proximal patellar tendon tear with 3 cm   retraction.  Partial tear of the distal quadriceps tendon.    Femorotibial compartments:  Unremarkable.          Medial meniscus:  Unremarkable.  No tear.    Lateral meniscus:  Unremarkable.  No tear.    Medial capsule supporting structures:  Unremarkable.  Normal medial   collateral ligament.    Lateral capsule supporting structures:  Unremarkable.  Normal lateral   collateral ligament.    Anterior cruciate ligament:  Unremarkable.    Posterior cruciate ligament:  Unremarkable.    Musculature:  Unremarkable.       IMPRESSION:       1.  Severely comminuted and displaced patellar fracture.  2.  Complete patellar tendon tear and extensive superior displacement of   the patellar fracture fragments.  Impression: Electronically signed by Gokul Wei MD on 08-13-22 at 1949    Scheduled Medications  amLODIPine, 5 mg, Oral, Daily  aspirin EC, 325 mg, Oral, Daily  docusate sodium, 100 mg, Oral, BID  escitalopram, 20 mg, Oral, Daily  hydroCHLOROthiazide, 12.5 mg, Oral, Daily  levothyroxine, 112 mcg, Oral, QAM  oxybutynin XL, 5 mg, Oral, BID  paliperidone, 12 mg, Oral, Daily  tamsulosin, 0.4 mg, Oral, Daily  zinc oxide, , Topical, BID    Infusions  lactated  ringers, 9 mL/hr, Last Rate: 9 mL/hr (08/15/22 1000)  sodium chloride, 75 mL/hr, Last Rate: 75 mL/hr (08/20/22 2024)    Diet  Diet Regular       Assessment/Plan     Active Hospital Problems    Diagnosis  POA   • **Closed displaced transverse fracture of left patella [S82.032A]  Yes   • Anxiety [F41.9]  Unknown   • Depression [F32.A]  Unknown   • Disease of thyroid gland [E07.9]  Unknown   • Hypertension [I10]  Unknown   • History of BPH [Z87.438]  Unknown   • Mental deficiency [F79]  Unknown      Resolved Hospital Problems   No resolved problems to display.       57 y.o. male admitted with Closed displaced transverse fracture of left patella.    · Patellar fracture with patellar tendon tear-s/p left knee irrigation debridement, debridement of the open petella fracture, revision ORIF of the patella, patellar tendon repair, and application of a multiplane external fixator by Dr. Figueroa on 8/15/22.  · Iron deficiency anemia plus some acute blood loss anemia from surgery-add oral iron.   · Essential hypertension-adequately controlled acutely  · Hypothyroidism-synthroid  · BPH-tamsulosin  · Anxiety-ativan  · Developmental delay with episodes of agitation-invega  · SCDs for DVT prophylaxis.  · Full code.  · Discussed with nursing staff.  · Anticipate discharge to SNU facility once arrangements have been made.      Gilmer Fitzgerald MD  [facility]  08/22/22  11:27 EDT    I wore protective equipment throughout this patient encounter including a face mask, gloves and protective eyewear.  Hand hygiene was performed before donning protective equipment and after removal when leaving the room.

## 2022-08-22 NOTE — PROGRESS NOTES
Nutrition Services    Patient Name:  Lucien Morrison  YOB: 1965  MRN: [id number]  Admit Date:  8/12/2022    Nutrition Services    Patient Name: Lucien Morrison  YOB: 1965  MRN: [id number]  Admission date: 8/12/2022       NUTRITION SCREENING      Encounter Information: 8/15 Patellar fracture with patellar tendon tear-s/p left knee irrigation debridement, debridement of the open petella fracture, revision ORIF of the patella, patellar tendon repair, and application of a multiplane external fixator.  Hx - mental deficiency, BPH, HTN, thyroid disease, anxiry, depression    Currently awaiting placement.  Encouraged po, Discussed food preferences with family.       PO Diet: Diet Regular   PO Supplements:    PO Intake:  50-75% most meals    Family reports good appetite.    Labs (reviewed below):         GI Function:  Wnl, incontient       Skin: Coccyx stage 2 - intact, blanchable, knee incision w/ drainage       Weight Hx Review: UBW 160lbs, now 168lbs       Nutrition Intervention:        Results from last 7 days   Lab Units 08/16/22  0427   SODIUM mmol/L 139   POTASSIUM mmol/L 3.7   CHLORIDE mmol/L 102   CO2 mmol/L 28.5   BUN mg/dL 21*   CREATININE mg/dL 0.54*   CALCIUM mg/dL 8.2*   GLUCOSE mg/dL 107*     Results from last 7 days   Lab Units 08/22/22  0439   HEMOGLOBIN g/dL 7.9*   HEMATOCRIT % 25.2*     COVID19   Date Value Ref Range Status   08/15/2022 Not Detected Not Detected - Ref. Range Final     No results found for: HGBA1C    RD to follow up per protocol.    Electronically signed by:  Kerry Renae RD  08/22/22 13:49 EDT    Electronically signed by:  Kerry Renae RD  08/22/22 13:48 EDT

## 2022-08-22 NOTE — PLAN OF CARE
Goal Outcome Evaluation:        Pts pain decreased only when moving in the bed, Pin site care completed this shift, pt is on bedrest, family was at bedside most of the day, rehab referrals sent. Pt is having a deep cough, asked for covid test to be completed

## 2022-08-23 VITALS
OXYGEN SATURATION: 93 % | BODY MASS INDEX: 25.56 KG/M2 | TEMPERATURE: 97.2 F | DIASTOLIC BLOOD PRESSURE: 78 MMHG | HEART RATE: 98 BPM | HEIGHT: 68 IN | RESPIRATION RATE: 16 BRPM | WEIGHT: 168.65 LBS | SYSTOLIC BLOOD PRESSURE: 140 MMHG

## 2022-08-23 LAB
DEPRECATED RDW RBC AUTO: 41.8 FL (ref 37–54)
ERYTHROCYTE [DISTWIDTH] IN BLOOD BY AUTOMATED COUNT: 12.6 % (ref 12.3–15.4)
HCT VFR BLD AUTO: 25.2 % (ref 37.5–51)
HGB BLD-MCNC: 8.1 G/DL (ref 13–17.7)
MCH RBC QN AUTO: 29.6 PG (ref 26.6–33)
MCHC RBC AUTO-ENTMCNC: 32.1 G/DL (ref 31.5–35.7)
MCV RBC AUTO: 92 FL (ref 79–97)
PLATELET # BLD AUTO: 327 10*3/MM3 (ref 140–450)
PMV BLD AUTO: 9.5 FL (ref 6–12)
RBC # BLD AUTO: 2.74 10*6/MM3 (ref 4.14–5.8)
WBC NRBC COR # BLD: 6.81 10*3/MM3 (ref 3.4–10.8)

## 2022-08-23 PROCEDURE — 85027 COMPLETE CBC AUTOMATED: CPT | Performed by: STUDENT IN AN ORGANIZED HEALTH CARE EDUCATION/TRAINING PROGRAM

## 2022-08-23 RX ORDER — FERROUS SULFATE 325(65) MG
325 TABLET ORAL
Qty: 30 TABLET | Refills: 0 | Status: SHIPPED | OUTPATIENT
Start: 2022-08-24

## 2022-08-23 RX ORDER — HYDROCODONE BITARTRATE AND ACETAMINOPHEN 5; 325 MG/1; MG/1
1 TABLET ORAL EVERY 8 HOURS PRN
Qty: 9 TABLET | Refills: 0 | Status: SHIPPED | OUTPATIENT
Start: 2022-08-23 | End: 2022-08-26

## 2022-08-23 RX ADMIN — ASPIRIN 325 MG: 325 TABLET, COATED ORAL at 09:38

## 2022-08-23 RX ADMIN — FERROUS SULFATE TAB 325 MG (65 MG ELEMENTAL FE) 325 MG: 325 (65 FE) TAB at 09:39

## 2022-08-23 RX ADMIN — TAMSULOSIN HYDROCHLORIDE 0.4 MG: 0.4 CAPSULE ORAL at 09:38

## 2022-08-23 RX ADMIN — PALIPERIDONE 12 MG: 6 TABLET, EXTENDED RELEASE ORAL at 09:39

## 2022-08-23 RX ADMIN — LEVOTHYROXINE SODIUM 112 MCG: 0.11 TABLET ORAL at 09:39

## 2022-08-23 RX ADMIN — HYDROCODONE BITARTRATE AND ACETAMINOPHEN 1 TABLET: 5; 325 TABLET ORAL at 14:17

## 2022-08-23 RX ADMIN — Medication: at 09:39

## 2022-08-23 RX ADMIN — HYDROCHLOROTHIAZIDE 12.5 MG: 12.5 TABLET ORAL at 09:39

## 2022-08-23 RX ADMIN — ESCITALOPRAM 20 MG: 20 TABLET, FILM COATED ORAL at 09:38

## 2022-08-23 RX ADMIN — AMLODIPINE BESYLATE 5 MG: 5 TABLET ORAL at 09:38

## 2022-08-23 RX ADMIN — OXYBUTYNIN CHLORIDE 5 MG: 5 TABLET, EXTENDED RELEASE ORAL at 09:38

## 2022-08-23 RX ADMIN — DOCUSATE SODIUM 100 MG: 100 CAPSULE, LIQUID FILLED ORAL at 09:39

## 2022-08-23 NOTE — CASE MANAGEMENT/SOCIAL WORK
Continued Stay Note  [facility]     Patient Name: Lucien Morrison  MRN: [id number]  Today's Date: 8/23/2022    Admit Date: 8/12/2022     Discharge Plan     Row Name 08/23/22 1240       Plan    Plan [facility] SNF -- Accepted.    Patient/Family in Agreement with Plan yes    Plan Comments Discharge orders noted. Spoke with Julissa/[facility] who's unable to book the trip today. Scheduled an [facility] for today at 1400 (spoke with Ronda). Updated [facility] Sigala and the patient's brother/Ken who are agreeable with the d/c plan. Called and left a message for Hermelinda/[facility]. Await her call back. Packet is on the chart.    Row Name 08/23/22 1118       Plan    Plan [facility] SNF -- Pending.    Patient/Family in Agreement with Plan yes    Plan Comments Spoke with Ovi/JV who said he has no availability at [facility], but will f/u with [facility] on whether they can accept to [place]. [facility] later received a call from the patient's brother/Ken who requests to send the patient back to [facility]. Referral sent in [facility]. Spoke with Hermelinda/Brooks Velasco who can accept and has a SNF bed for the patient today. Hermelinda said the patient does not need another Covid-19 test for d/c today/tomorrow. Updated [facility] Sigala who said the pt would benefit from an ambulance transport. Updated [facility] Espino who will discuss with A. Await discharge orders when medically appropriate.               Discharge Codes    No documentation.               Expected Discharge Date and Time     Expected Discharge Date Expected Discharge Time    Aug 23, 2022             Mishel NOLASCO RN

## 2022-08-23 NOTE — DISCHARGE SUMMARY
Patient Name: Lucien Morrison  : 1965  MRN: [id number]    Date of Admission: 2022  Date of Discharge:  2022  Primary Care Physician: Ran Bragg MD      Chief Complaint:   needs leg surgery      Discharge Diagnoses     Active Hospital Problems    Diagnosis  POA   • **Closed displaced transverse fracture of left patella [S82.032A]  Yes   • Anxiety [F41.9]  Unknown   • Depression [F32.A]  Unknown   • Disease of thyroid gland [E07.9]  Unknown   • Hypertension [I10]  Unknown   • History of BPH [Z87.438]  Unknown   • Mental deficiency [F79]  Unknown      Resolved Hospital Problems   No resolved problems to display.        Hospital Course     Mr. Morrison is a 57 y.o. male with a history of diverticulitis, hypertension, impulse control disorder, BPH, hypothyroidism, developmental delay who presented to [facility] initially complaining of needs leg surgery.  Please see the admitting history and physical for further details.  He was found to have closed displaced transverse fracture left patella and was admitted to the hospital for further evaluation and treatment.      Mental deficiency since childhood who reinjured his left knee with patella fracture and ruptured tendon.  Orthopedic surgery completed ORIF on 8/15/22 with external fixator.  Patient tolerated procedure well.     Incidental SABIHA requiring supplemental iron daily.  Serum hemoglobin stable with trend.    Patient tolerating diet and appears medically stable for discharge to SNF on 2022.  Follow-up with primary care and orthopedic surgery as previously discussed.     Day of Discharge     Physical Exam:  Temp:  [97 °F (36.1 °C)-98.1 °F (36.7 °C)] 97.2 °F (36.2 °C)  Heart Rate:  [77-98] 98  Resp:  [16] 16  BP: (118-145)/(74-85) 140/78  Body mass index is 25.64 kg/m².     Physical Exam  Constitutional:       General: He is not in acute distress.     Appearance: He is not toxic-appearing.      Comments: Generally weak   HENT:       Head: Normocephalic and atraumatic.   Eyes:      Extraocular Movements: Extraocular movements intact.      Conjunctiva/sclera: Conjunctivae normal.   Cardiovascular:      Rate and Rhythm: Normal rate.      Heart sounds: Normal heart sounds.   Pulmonary:      Effort: Pulmonary effort is normal.      Breath sounds: Normal breath sounds.   Abdominal:      General: Bowel sounds are normal.      Palpations: Abdomen is soft.   Musculoskeletal:         General: Tenderness (LLE) present.      Cervical back: Normal range of motion and neck supple.      Right lower leg: No edema.      Left lower leg: Edema (external fixator in place) present.   Skin:     General: Skin is warm and dry.   Neurological:      Mental Status: He is alert. Mental status is at baseline.      Cranial Nerves: No cranial nerve deficit.   Psychiatric:         Behavior: Behavior normal.         Thought Content: Thought content normal.         Consultants     Consult Orders (all) (From admission, onward)     Start     Ordered    08/13/22 0000  Inpatient Case Management  Consult  Once        Provider:  (Not yet assigned)    08/12/22 1824    08/12/22 1824  Inpatient Orthopedic Surgery Consult  Once        Specialty:  Orthopedic Surgery  Provider:  Juan R Figueroa MD    08/12/22 1824    08/12/22 1740  [facility] (on-call MD unless specified) Details  Once        Specialty:  Hospitalist  Provider:  (Not yet assigned)    08/12/22 1739    08/12/22 1703  Ortho (on-call MD unless specified)  Once        Specialty:  Orthopedic Surgery  Provider:  (Not yet assigned)    08/12/22 1702              Procedures     PATELLA OPEN REDUCTION INTERNAL FIXATION REVISION AND TENDON REPAIR with EXTERNAL FIXATOR APPLICATOR      Imaging Results (All)     Procedure Component Value Units Date/Time    MRI Knee Left With & Without Contrast [[id number]] Collected: 08/13/22 1950     Updated: 08/13/22 1950    Narrative:        Patient: LUNA PEDRO  Time Out:  19:49  Exam(s): MRI LEFT KNEE W WO Contrast     EXAM:    MR Left Lower Extremity Without and With Intravenous Contrast, Knee    CLINICAL HISTORY:     Reason for exam: Infection, patellar fracture nonunion with   displacement, and possible patellar tendon injury.    TECHNIQUE:    Multiplanar magnetic resonance images of the left knee without and with   intravenous contrast.    COMPARISON:    No relevant prior studies available.    FINDINGS:   BONES JOINTS CARTILAGE:    Patellofemoral compartment:  Comminuted fracture of the patella with   extensive displacement.  Proximal patellar tendon tear with 3 cm   retraction.  Partial tear of the distal quadriceps tendon.    Femorotibial compartments:  Unremarkable.          Medial meniscus:  Unremarkable.  No tear.    Lateral meniscus:  Unremarkable.  No tear.    Medial capsule supporting structures:  Unremarkable.  Normal medial   collateral ligament.    Lateral capsule supporting structures:  Unremarkable.  Normal lateral   collateral ligament.    Anterior cruciate ligament:  Unremarkable.    Posterior cruciate ligament:  Unremarkable.    Musculature:  Unremarkable.       IMPRESSION:       1.  Severely comminuted and displaced patellar fracture.  2.  Complete patellar tendon tear and extensive superior displacement of   the patellar fracture fragments.      Impression:          Electronically signed by Gokul Wei MD on 08-13-22 at 1949    XR Chest 1 View [[id number]] Collected: 08/12/22 1844     Updated: 08/12/22 1849    Narrative:      XR CHEST 1 VW-     HISTORY:  Preop.     COMPARISON:  None     FINDINGS:    A single portable view of the chest was obtained. There is a left PICC.  The cardiac silhouette is upper normal to mildly enlarged. The aorta is  tortuous. There are prominent bibasilar interstitial opacities, which  could reflect atelectasis/scarring, although interstitial pulmonary  edema could have a similar appearance. Pleural spaces are clear. There  is  multilevel degenerative disc disease. There is partially imaged  surgical hardware in the proximal right humerus.     This report was finalized on 8/12/2022 6:46 PM by Dr. Janeen Michel M.D.       XR Knee 1 or 2 View Left [[id number]] Collected: 08/12/22 1722     Updated: 08/12/22 1727    Narrative:      XR KNEE 1 OR 2 VW LEFT-     INDICATIONS: Trauma     TECHNIQUE: 2 views of the left knee     COMPARISON: None available     FINDINGS:     Fracture fragments of the patella are evident, and the patella is  elevated, that may in part be from straight leg positioning, but  certainly raises suspicion for patellar tendon injury/disruption,  correlate with clinical exam. No other evidence of fracture is  identified.       Impression:         As described.           This report was finalized on 8/12/2022 5:24 PM by Dr. Junior Jacobs M.D.               Pertinent Labs     Results from last 7 days   Lab Units 08/23/22  0435 08/22/22  0439   WBC 10*3/mm3 6.81 6.96   HEMOGLOBIN g/dL 8.1* 7.9*   PLATELETS 10*3/mm3 327 304                     Invalid input(s): LDLCALC      Results from last 7 days   Lab Units 08/22/22  1635   COVID19  Not Detected       Test Results Pending at Discharge       Discharge Details        Discharge Medications      New Medications      Instructions Start Date   ferrous sulfate 325 (65 FE) MG tablet   325 mg, Oral, Daily With Breakfast   Start Date: August 24, 2022        Changes to Medications      Instructions Start Date   HYDROcodone-acetaminophen 5-325 MG per tablet  Commonly known as: NORCO  What changed:   · when to take this  · reasons to take this   1 tablet, Oral, Every 8 Hours PRN         Continue These Medications      Instructions Start Date   acetaminophen 325 MG tablet  Commonly known as: TYLENOL   650 mg, Oral, Every 6 Hours PRN, 1-2 Tablets      amLODIPine 5 MG tablet  Commonly known as: NORVASC   5 mg, Oral, Daily      aspirin  MG tablet   325 mg, Oral, Daily      docusate  sodium 100 MG capsule  Commonly known as: COLACE   100 mg, Oral, 2 Times Daily      escitalopram 20 MG tablet  Commonly known as: LEXAPRO   20 mg, Oral, Daily      hydroCHLOROthiazide 12.5 MG capsule  Commonly known as: MICROZIDE   12.5 mg, Oral, Daily      levothyroxine 125 MCG tablet  Commonly known as: SYNTHROID, LEVOTHROID   112 mcg, Oral, Daily      loperamide 2 MG capsule  Commonly known as: IMODIUM   2 mg, Oral, 4 Times Daily PRN      ondansetron 4 MG tablet  Commonly known as: ZOFRAN   4 mg, Oral, Every 4 Hours PRN      oxybutynin XL 5 MG 24 hr tablet  Commonly known as: DITROPAN-XL   5 mg, Oral, 2 Times Daily      paliperidone 6 MG 24 hr tablet  Commonly known as: INVEGA   12 mg, Oral, Daily      pseudoephedrine 120 MG 12 hr tablet  Commonly known as: SUDAFED   120 mg, Oral, Every 12 Hours PRN      tamsulosin 0.4 MG capsule 24 hr capsule  Commonly known as: FLOMAX   1 capsule, Oral, Daily      Zinc Oxide 25 % paste   1 application, Topical, 2 Times Daily, Apply To Buttocks         Stop These Medications    Cefepime HCl 100 g reconstituted solution     clonazePAM 1 MG tablet  Commonly known as: KlonoPIN            No Known Allergies    Discharge Disposition:  Skilled Nursing Facility (DC - External)      Discharge Diet:  Diet Order   Procedures   • Diet Regular       Discharge Activity:   Activity Instructions     Activity as Tolerated            CODE STATUS:    Code Status and Medical Interventions:   Ordered at: 08/12/22 1824     Code Status (Patient has no pulse and is not breathing):    CPR (Attempt to Resuscitate)     Medical Interventions (Patient has pulse or is breathing):    Full Support       No future appointments.  Additional Instructions for the Follow-ups that You Need to Schedule     Discharge Follow-up with PCP   As directed       Currently Documented PCP:    Ran Bragg MD    PCP Phone Number:    [phone]     Follow Up Details: Please call to schedule a 1 week or earliest available  follow-up appointment with PCP; BP BMP, CBC            Contact information for follow-up providers     Ran Bragg MD .    Specialty: Internal Medicine  Contact information:  [address] [facility] [place] [address]  [phone]             Maki Bal APRN .    Specialty: Family Medicine  Contact information:  [address] [facility] [place] [address] [phone]             Juan R Figueroa II, MD. Call.    Specialty: Orthopedic Surgery  Why: Postop follow-up  Contact information:  [address] [facility] [address]  [facility] [address]  [phone]             Ran Bragg MD .    Specialty: Internal Medicine  Why: Please call to schedule a 1 week or earliest available follow-up appointment with PCP; BP BMP, CBC  Contact information:  [address] [facility] [place] [id number] [phone]             Maki Bal APRN .    Specialty: Family Medicine  Why: Please call to schedule a 1 week or earliest available follow-up appointment with PCP; BP BMP, CBC  Contact information:  [address] [facility] [place] [id number] [phone]                   Contact information for after-discharge care     Destination     [facility] [place] .    Service: Skilled Nursing  Contact information:  [address] [facility] [id number] [phone]                             Additional Instructions for the Follow-ups that You Need to Schedule     Discharge Follow-up with PCP   As directed       Currently Documented PCP:    Ran Bragg MD    PCP Phone Number:    [phone]     Follow Up Details: Please call to schedule a 1 week or earliest available follow-up appointment with PCP; BP BMP, CBC           Time Spent on Discharge:  Greater than 30 minutes      JULIO Bryan  [facility]  08/23/22  12:34 EDT

## 2022-08-23 NOTE — CASE MANAGEMENT/SOCIAL WORK
Continued Stay Note  [facility]     Patient Name: Lucien Morrison  MRN: [id number]  Today's Date: 8/23/2022    Admit Date: 8/12/2022     Discharge Plan     Row Name 08/23/22 1319       Plan    Plan [facility] -- Accepted.    Patient/Family in Agreement with Plan yes    Plan Comments [facility] received a call back from HermelindaDedrickBrooks Velasco who is agreeable with the entire d/c plan. No other needs identified. Packet is on the chart.    Final Discharge Disposition Code 03 - skilled nursing facility (SNF)    Final Note [facility] SN[facility].    Row Name 08/23/22 1240       Plan    Plan [facility] SN[facility] -- Accepted.    Patient/Family in Agreement with Plan yes    Plan Comments Discharge orders noted. Spoke with [facility] who's unable to book the trip today. Scheduled an [facility] for today at 1400 (spoke with Ronda). Updated ZAID Sigala and the patient's brother/Ken who are agreeable with the d/c plan. Called and left a message for Mahamed Velasco. Await her call back. Packet is on the chart.    Row Name 08/23/22 1118       Plan    Plan [facility] SN[facility] -- Pending.    Patient/Family in Agreement with Plan yes    Plan Comments Spoke with Ovi/JV who said he has no availability at [facility], but will f/u with [facility] on whether they can accept to [place]. [facility] later received a call from the patient's brother/Ken who requests to send the patient back to [facility]. Referral sent in [facility]. Spoke with Mahamed Velasco who can accept and has a SNF bed for the patient today. Hermelinda said the patient does not need another Covid-19 test for d/c today/tomorrow. Updated ZAID Sigala who said the pt would benefit from an ambulance transport. Updated ZAID Espino who will discuss with A. Await discharge orders when medically appropriate.               Discharge Codes    No documentation.               Expected Discharge Date and Time     Expected Discharge Date Expected Discharge Time    Aug 23, 2022              Mishel NOLASCO RN

## 2022-08-23 NOTE — PROGRESS NOTES
Wound looking very good.  No significant drainage noted.  Pin sites clean dry and intact.  Continue with current plan.  Discharge to rehab once bed is available

## 2022-08-23 NOTE — CASE MANAGEMENT/SOCIAL WORK
"Physicians Statement of Medical Necessity for  Ambulance Transportation    GENERAL INFORMATION     Name: Lucien Morrison  YOB: 1965  Medicare #: [id number] (See Facesheet)  Transport Date: 8/23/2022 (Valid for round trips this date, or for scheduled repetitive trips for 60 days from the date signed below.)  Origin: [facility]  Destination: [facility].  Is the Patient's stay covered under Medicare Part A (PPS/DRG?)Yes  Closest appropriate facility? Yes  If this a hosp-hosp transfer? No  Is this a hospice patient? No    MEDICAL NECESSITY QUESTIONAIRE    Ambulance Transportation is medically necessary only if other means of transportation are contraindicated or would be potentially harmful to the patient.  To meet this requirement, the patient must be either \"bed confined\" or suffer from a condition such that transport by means other than an ambulance is contraindicated by the patient's condition.  The following questions must be answered by the healthcare professional signing below for this form to be valid:     1) Describe the MEDICAL CONDITION (physical and/or mental) of this patient AT THE TIME OF AMBULANCE TRANSPORT that requires the patient to be transported in an ambulance, and why transport by other means is contraindicated by the patient's condition:   Past Medical History:   Diagnosis Date   • Anemia    • Anxiety    • Cataract     bilateral   • Colitis    • Depression    • Disease of thyroid gland    • Diverticulitis    • Hard to intubate    • Hemorrhoids    • History of BPH    • Hydrocele in adult    • Hyperopia    • Hypertension    • Impulse control disorder    • Seasonal allergies    • Shingles       Past Surgical History:   Procedure Laterality Date   • BACK SURGERY      nodule removed   • ORCHIECTOMY Left    • ORIF HIP FRACTURE Right    • PATELLA OPEN REDUCTION INTERNAL FIXATION Left 8/15/2022    Procedure: PATELLA OPEN REDUCTION INTERNAL FIXATION " "REVISION AND TENDON REPAIR with EXTERNAL FIXATOR APPLICATOR;  Surgeon: Juan R Figueroa II, MD;  Location: [facility] OR;  Service: Orthopedics;  Laterality: Left;      2) Is this patient \"bed confined\" as defined below?Yes   To be \"bed confined\" the patient must satisfy all three of the following criteria:  (1) unable to get up from bed without assistance; AND (2) unable to ambulate;  AND (3) unable to sit in a chair or wheelchair.  3) Can this patient safely be transported by car or wheelchair van (I.e., may safely sit during transport, without an attendant or monitoring?)No   4. In addition to completing questions 1-3 above, please check any of the following conditions that apply*:          *Note: supporting documentation for any boxes checked must be maintained in the patient's medical records Patient is confused, Unable to tolerate seated position for time needed to transport and Orthopedic device (backboard, halo, pins, traction, brace, wedge, etc.) required special handling during transport      SIGNATURE OF PHYSICIAN OR OTHER AUTHORIZED HEALTHCARE PROFESSIONAL    I certify that the above information is true and correct based on my evaluation of this patient, and represent that the patient requires transport by ambulance and that other forms of transport are contraindicated.  I understand that this information will be used by the Centers for Medicare and Medicaid Services (CMS) to support the determiniation of medical necessity for ambulance services, and I represent that I have personal knowledge of the patient's condition at the time of transport.       If this box is checked, I also certify that the patient is physically or mentally incapable of signing the ambulance service's claim form and that the institution with which I am affiliated has furnished care, services or assistance to the patient.  My signature below is made on behalf of the patient pursuant to 42 .36(b)(4). In accordance " with 42 .37, the specific reason(s) that the patient is physically or mentally incapable of signing the claim for is as follows:     Signature of Physician or Healthcare Professional  Date/Time:        (For Scheduled repetitive transport, this form is not valid for transports performed more than 60 days after this date).                                                                                                                                            --------------------------------------------------------------------------------------------  Printed Name and Credentials of Physician or Authorized Healthcare Professional     *Form must be signed by patient's attending physician for scheduled, repetitive transports,.  For non-repetitive ambulance transports, if unable to obtain the signature of the attending physician, any of the following may sign (please select below):     Physician  Clinical Nurse Specialist  Registered Nurse     Physician Assistant  Discharge Planner  Licensed Practical Nurse     Nurse Practitioner

## 2022-08-23 NOTE — CASE MANAGEMENT/SOCIAL WORK
Continued Stay Note  [facility]     Patient Name: Lucien Morrison  MRN: [id number]  Today's Date: 8/23/2022    Admit Date: 8/12/2022     Discharge Plan     Row Name 08/23/22 1118       Plan    Plan [facility] -- Pending.    Patient/Family in Agreement with Plan yes    Plan Comments Spoke with Ovi/JV who said he has no availability at [facility], but will f/u with [facility] on whether they can accept to [place]. [facility] later received a call from the patient's brother/Ken, updated him and he requests to send the patient back to [facility]. Referral sent in [facility]. Spoke with Hermelinda/Brooks Velasco who can accept and has a SNF bed for the patient today. Hermelinda said the patient does not need another Covid-19 test for d/c today/tomorrow. Updated ZAID Sigala who said the pt would benefit from an ambulance transport. Updated ZAID Espino who will discuss with A. Await discharge orders when medically appropriate.               Discharge Codes    No documentation.               Expected Discharge Date and Time     Expected Discharge Date Expected Discharge Time    Aug 24, 2022             Mishel NOLASCO, RN

## 2022-08-23 NOTE — DISCHARGE PLACEMENT REQUEST
"Lucien Morrison (57 y.o. Male)             Date of Birth   1965    Social Security Number       Address   [address]  [place] [address]    Home Phone   [phone]    MRN   [id number]       Taoism   None    Marital Status   Single                            Admission Date   8/12/22    Admission Type   Emergency    Admitting Provider   Michela Peralta MD    Attending Provider   Jordan Dawkins MD    Department, Room/Bed   [address], P881/1       Discharge Date       Discharge Disposition       Discharge Destination                               Attending Provider: Jordan Dawkins MD    Allergies: No Known Allergies    Isolation: None   Infection: None   Code Status: CPR   Advance Care Planning Activity    Ht: 172.7 cm (68\")   Wt: 76.5 kg (168 lb 10.4 oz)    Admission Cmt: None   Principal Problem: Closed displaced transverse fracture of left patella [S82.032A]                 Active Insurance as of 8/12/2022     Primary Coverage     Payor Plan Insurance Group Employer/Plan Group    MEDICARE MEDICARE A & B      Payor Plan Address Payor Plan Phone Number Payor Plan Fax Number Effective Dates    [address] [phone]  1/1/1993 - None Entered    [facility] [id number]       Subscriber Name Subscriber Birth Date Member ID       LUCIEN MORRISON 1965 [id number]           Secondary Coverage     Payor Plan Insurance Group Employer/Plan Group    [place]      Payor Plan Address Payor Plan Phone Number Payor Plan Fax Number Effective Dates    [address]   10/20/2020 - None Entered    [place] [address]       Subscriber Name Subscriber Birth Date Member ID       LUCIEN MORRISON 1965 [id number]                 Emergency Contacts      (Rel.) Home Phone Work Phone Mobile Phone    Christina Walsh (Mother) [phone] -- [phone]    MAYELAMINE (Brother) [phone] -- [phone]    "

## 2022-08-24 NOTE — PLAN OF CARE
Goal Outcome Evaluation:   Patient stable, VSS and voiding function is intact, x1 BM this am. Pain is managed with prn meds and positioning. Pin sites clean and dry. Patient on continued bedrest, NWB to LLE, skin injury prevention techniques in place and patient turned Q2. Patient returning to [facility] via ambulance.

## 2022-09-07 ENCOUNTER — ANESTHESIA EVENT (OUTPATIENT)
Dept: PERIOP | Facility: HOSPITAL | Age: 57
End: 2022-09-07

## 2022-09-07 RX ORDER — CHOLECALCIFEROL (VITAMIN D3) 125 MCG
5 CAPSULE ORAL NIGHTLY PRN
COMMUNITY

## 2022-09-07 RX ORDER — HYDROCODONE BITARTRATE AND ACETAMINOPHEN 5; 325 MG/1; MG/1
1 TABLET ORAL EVERY 4 HOURS PRN
COMMUNITY
End: 2022-11-09 | Stop reason: HOSPADM

## 2022-09-07 RX ORDER — CLONAZEPAM 1 MG/1
1 TABLET ORAL 2 TIMES DAILY
COMMUNITY

## 2022-09-08 ENCOUNTER — ANESTHESIA (OUTPATIENT)
Dept: PERIOP | Facility: HOSPITAL | Age: 57
End: 2022-09-08

## 2022-09-30 ENCOUNTER — APPOINTMENT (OUTPATIENT)
Dept: PREADMISSION TESTING | Facility: HOSPITAL | Age: 57
End: 2022-09-30

## 2022-10-03 ENCOUNTER — HOSPITAL ENCOUNTER (OUTPATIENT)
Facility: HOSPITAL | Age: 57
Setting detail: HOSPITAL OUTPATIENT SURGERY
Discharge: NURSING, REHAB OR LONG-TERM CARE | End: 2022-10-03
Attending: ORTHOPAEDIC SURGERY | Admitting: ORTHOPAEDIC SURGERY

## 2022-10-03 VITALS
RESPIRATION RATE: 16 BRPM | HEIGHT: 68 IN | TEMPERATURE: 97.6 F | HEART RATE: 71 BPM | SYSTOLIC BLOOD PRESSURE: 148 MMHG | OXYGEN SATURATION: 95 % | DIASTOLIC BLOOD PRESSURE: 91 MMHG | BODY MASS INDEX: 25.65 KG/M2

## 2022-10-03 PROCEDURE — 25010000002 DEXAMETHASONE SODIUM PHOSPHATE 20 MG/5ML SOLUTION: Performed by: NURSE ANESTHETIST, CERTIFIED REGISTERED

## 2022-10-03 PROCEDURE — 25010000002 PROPOFOL 10 MG/ML EMULSION: Performed by: NURSE ANESTHETIST, CERTIFIED REGISTERED

## 2022-10-03 PROCEDURE — 25010000002 ONDANSETRON PER 1 MG: Performed by: NURSE ANESTHETIST, CERTIFIED REGISTERED

## 2022-10-03 RX ORDER — HYDROCODONE BITARTRATE AND ACETAMINOPHEN 7.5; 325 MG/1; MG/1
1 TABLET ORAL ONCE AS NEEDED
Status: DISCONTINUED | OUTPATIENT
Start: 2022-10-03 | End: 2022-10-03 | Stop reason: HOSPADM

## 2022-10-03 RX ORDER — FLUMAZENIL 0.1 MG/ML
0.2 INJECTION INTRAVENOUS AS NEEDED
Status: DISCONTINUED | OUTPATIENT
Start: 2022-10-03 | End: 2022-10-03 | Stop reason: HOSPADM

## 2022-10-03 RX ORDER — NALOXONE HCL 0.4 MG/ML
0.2 VIAL (ML) INJECTION AS NEEDED
Status: DISCONTINUED | OUTPATIENT
Start: 2022-10-03 | End: 2022-10-03 | Stop reason: HOSPADM

## 2022-10-03 RX ORDER — FAMOTIDINE 10 MG/ML
20 INJECTION, SOLUTION INTRAVENOUS ONCE
Status: COMPLETED | OUTPATIENT
Start: 2022-10-03 | End: 2022-10-03

## 2022-10-03 RX ORDER — EPHEDRINE SULFATE 50 MG/ML
5 INJECTION, SOLUTION INTRAVENOUS ONCE AS NEEDED
Status: DISCONTINUED | OUTPATIENT
Start: 2022-10-03 | End: 2022-10-03 | Stop reason: HOSPADM

## 2022-10-03 RX ORDER — DIPHENHYDRAMINE HYDROCHLORIDE 50 MG/ML
12.5 INJECTION INTRAMUSCULAR; INTRAVENOUS
Status: DISCONTINUED | OUTPATIENT
Start: 2022-10-03 | End: 2022-10-03 | Stop reason: HOSPADM

## 2022-10-03 RX ORDER — FENTANYL CITRATE 50 UG/ML
50 INJECTION, SOLUTION INTRAMUSCULAR; INTRAVENOUS
Status: DISCONTINUED | OUTPATIENT
Start: 2022-10-03 | End: 2022-10-03 | Stop reason: HOSPADM

## 2022-10-03 RX ORDER — IBUPROFEN 600 MG/1
600 TABLET ORAL ONCE AS NEEDED
Status: DISCONTINUED | OUTPATIENT
Start: 2022-10-03 | End: 2022-10-03 | Stop reason: HOSPADM

## 2022-10-03 RX ORDER — SODIUM CHLORIDE 0.9 % (FLUSH) 0.9 %
3-10 SYRINGE (ML) INJECTION AS NEEDED
Status: DISCONTINUED | OUTPATIENT
Start: 2022-10-03 | End: 2022-10-03 | Stop reason: HOSPADM

## 2022-10-03 RX ORDER — OXYCODONE AND ACETAMINOPHEN 7.5; 325 MG/1; MG/1
1 TABLET ORAL EVERY 4 HOURS PRN
Status: DISCONTINUED | OUTPATIENT
Start: 2022-10-03 | End: 2022-10-03 | Stop reason: HOSPADM

## 2022-10-03 RX ORDER — SODIUM CHLORIDE 0.9 % (FLUSH) 0.9 %
3 SYRINGE (ML) INJECTION EVERY 12 HOURS SCHEDULED
Status: DISCONTINUED | OUTPATIENT
Start: 2022-10-03 | End: 2022-10-03 | Stop reason: HOSPADM

## 2022-10-03 RX ORDER — PROPOFOL 10 MG/ML
VIAL (ML) INTRAVENOUS AS NEEDED
Status: DISCONTINUED | OUTPATIENT
Start: 2022-10-03 | End: 2022-10-03 | Stop reason: SURG

## 2022-10-03 RX ORDER — PROMETHAZINE HYDROCHLORIDE 25 MG/1
25 SUPPOSITORY RECTAL ONCE AS NEEDED
Status: DISCONTINUED | OUTPATIENT
Start: 2022-10-03 | End: 2022-10-03 | Stop reason: HOSPADM

## 2022-10-03 RX ORDER — PROMETHAZINE HYDROCHLORIDE 25 MG/1
25 TABLET ORAL ONCE AS NEEDED
Status: DISCONTINUED | OUTPATIENT
Start: 2022-10-03 | End: 2022-10-03 | Stop reason: HOSPADM

## 2022-10-03 RX ORDER — DIPHENHYDRAMINE HCL 25 MG
25 CAPSULE ORAL
Status: DISCONTINUED | OUTPATIENT
Start: 2022-10-03 | End: 2022-10-03 | Stop reason: HOSPADM

## 2022-10-03 RX ORDER — ONDANSETRON 2 MG/ML
4 INJECTION INTRAMUSCULAR; INTRAVENOUS ONCE AS NEEDED
Status: DISCONTINUED | OUTPATIENT
Start: 2022-10-03 | End: 2022-10-03 | Stop reason: HOSPADM

## 2022-10-03 RX ORDER — ONDANSETRON 2 MG/ML
INJECTION INTRAMUSCULAR; INTRAVENOUS AS NEEDED
Status: DISCONTINUED | OUTPATIENT
Start: 2022-10-03 | End: 2022-10-03 | Stop reason: SURG

## 2022-10-03 RX ORDER — LABETALOL HYDROCHLORIDE 5 MG/ML
5 INJECTION, SOLUTION INTRAVENOUS
Status: DISCONTINUED | OUTPATIENT
Start: 2022-10-03 | End: 2022-10-03 | Stop reason: HOSPADM

## 2022-10-03 RX ORDER — SODIUM CHLORIDE, SODIUM LACTATE, POTASSIUM CHLORIDE, CALCIUM CHLORIDE 600; 310; 30; 20 MG/100ML; MG/100ML; MG/100ML; MG/100ML
9 INJECTION, SOLUTION INTRAVENOUS CONTINUOUS
Status: DISCONTINUED | OUTPATIENT
Start: 2022-10-03 | End: 2022-10-03 | Stop reason: HOSPADM

## 2022-10-03 RX ORDER — GINSENG 100 MG
1 CAPSULE ORAL 2 TIMES DAILY
COMMUNITY
End: 2022-11-01

## 2022-10-03 RX ORDER — HYDRALAZINE HYDROCHLORIDE 20 MG/ML
5 INJECTION INTRAMUSCULAR; INTRAVENOUS
Status: DISCONTINUED | OUTPATIENT
Start: 2022-10-03 | End: 2022-10-03 | Stop reason: HOSPADM

## 2022-10-03 RX ORDER — LIDOCAINE HYDROCHLORIDE 20 MG/ML
INJECTION, SOLUTION INFILTRATION; PERINEURAL AS NEEDED
Status: DISCONTINUED | OUTPATIENT
Start: 2022-10-03 | End: 2022-10-03 | Stop reason: SURG

## 2022-10-03 RX ORDER — DEXAMETHASONE SODIUM PHOSPHATE 4 MG/ML
INJECTION, SOLUTION INTRA-ARTICULAR; INTRALESIONAL; INTRAMUSCULAR; INTRAVENOUS; SOFT TISSUE AS NEEDED
Status: DISCONTINUED | OUTPATIENT
Start: 2022-10-03 | End: 2022-10-03 | Stop reason: SURG

## 2022-10-03 RX ORDER — HYDROMORPHONE HYDROCHLORIDE 1 MG/ML
0.5 INJECTION, SOLUTION INTRAMUSCULAR; INTRAVENOUS; SUBCUTANEOUS
Status: DISCONTINUED | OUTPATIENT
Start: 2022-10-03 | End: 2022-10-03 | Stop reason: HOSPADM

## 2022-10-03 RX ADMIN — FAMOTIDINE 20 MG: 10 INJECTION INTRAVENOUS at 11:09

## 2022-10-03 RX ADMIN — LIDOCAINE HYDROCHLORIDE 80 MG: 20 INJECTION, SOLUTION INFILTRATION; PERINEURAL at 14:20

## 2022-10-03 RX ADMIN — ONDANSETRON 4 MG: 2 INJECTION INTRAMUSCULAR; INTRAVENOUS at 14:20

## 2022-10-03 RX ADMIN — SODIUM CHLORIDE, POTASSIUM CHLORIDE, SODIUM LACTATE AND CALCIUM CHLORIDE 9 ML/HR: 600; 310; 30; 20 INJECTION, SOLUTION INTRAVENOUS at 10:33

## 2022-10-03 RX ADMIN — DEXAMETHASONE SODIUM PHOSPHATE 8 MG: 4 INJECTION, SOLUTION INTRAMUSCULAR; INTRAVENOUS at 14:20

## 2022-10-03 RX ADMIN — PROPOFOL 130 MG: 10 INJECTION, EMULSION INTRAVENOUS at 14:20

## 2022-10-03 NOTE — ANESTHESIA POSTPROCEDURE EVALUATION
"Patient: Lucien Morrison    Procedure Summary     Date: 10/03/22 Room / Location: [facility] OR [address] [place] [address] [facility] OR [facility]    Anesthesia Start: 1415 Anesthesia Stop: 1446    Procedure: REMOVAL LEFT LEG EXTERNAL  FIXATOR (Left Ankle) Diagnosis:     Surgeons: Juan R Figueroa II, MD Provider: Junior Villa MD    Anesthesia Type: general ASA Status: 3          Anesthesia Type: general    Vitals  Vitals Value Taken Time   /75 10/03/22 1500   Temp 36.4 °C (97.6 °F) 10/03/22 1443   Pulse 78 10/03/22 1507   Resp 16 10/03/22 1500   SpO2 97 % 10/03/22 1507   Vitals shown include unvalidated device data.        Post Anesthesia Care and Evaluation    Patient location during evaluation: bedside  Patient participation: complete - patient participated  Level of consciousness: awake  Pain management: adequate    Airway patency: patent  Anesthetic complications: No anesthetic complications    Cardiovascular status: acceptable  Respiratory status: acceptable  Hydration status: acceptable    Comments: */91 (BP Location: Left arm, Patient Position: Sitting)   Pulse 71   Temp 36.4 °C (97.6 °F) (Oral)   Resp 16   Ht 172.7 cm (67.99\")   SpO2 95%   BMI 25.65 kg/m²         "

## 2022-10-03 NOTE — DISCHARGE INSTRUCTIONS
Okay for weightbearing as tolerated left lower extremity    Pin site care for the next 5 to 7 days until no longer having any drainage.    Okay to begin range of motion on the left knee

## 2022-10-03 NOTE — ANESTHESIA PREPROCEDURE EVALUATION
Anesthesia Evaluation     history of anesthetic complications (h/o Difficult Intubation):  NPO Solid Status: > 8 hours  NPO Liquid Status: > 2 hours           Airway   Mallampati: III  Difficult intubation highly probable  Dental - normal exam     Pulmonary - negative pulmonary ROS and normal exam   (-) COPD, asthma, sleep apnea, not a smoker    PE comment: nonlabored  Cardiovascular - normal exam  Exercise tolerance: poor (<4 METS)    Rhythm: regular  Rate: normal    (+) hypertension,   (-) valvular problems/murmurs, past MI, CAD, dysrhythmias, angina      Neuro/Psych  (+) psychiatric history Anxiety and Depression,    (-) seizures, TIA, CVA    ROS Comment: Mental deficiency;  Impulse control disorder  GI/Hepatic/Renal/Endo    (+)   thyroid problem   (-) GERD, liver disease, no renal disease, diabetes    Musculoskeletal (-) negative ROS        ROS comment: L patella fracture  Abdominal    Substance History      OB/GYN          Other   blood dyscrasia anemia,                     Anesthesia Plan    ASA 3     general     (Easily intubated with CMAC in past. Would have CMAC in room or use LMA)  intravenous induction     Anesthetic plan, risks, benefits, and alternatives have been provided, discussed and informed consent has been obtained with: patient.        CODE STATUS:

## 2022-10-03 NOTE — ANESTHESIA POSTPROCEDURE EVALUATION
Patient: Lucien Morrison    Procedure Summary     Date: 10/03/22 Room / Location: [facility] OR [address] [place] [address] [facility]    Anesthesia Start: 1415 Anesthesia Stop: 1446    Procedure: REMOVAL LEFT LEG EXTERNAL  FIXATOR (Left Ankle) Diagnosis:     Surgeons: Juan R Figueroa II, MD Provider: Junior Villa MD    Anesthesia Type: general ASA Status: 3          Anesthesia Type: general    Vitals  Vitals Value Taken Time   BP 98/56 10/03/22 1445   Temp     Pulse 69 10/03/22 1447   Resp     SpO2 99 % 10/03/22 1447   Vitals shown include unvalidated device data.        Post Anesthesia Care and Evaluation    Patient location during evaluation: bedside  Patient participation: complete - patient participated  Level of consciousness: awake  Pain management: adequate    Airway patency: patent  Anesthetic complications: No anesthetic complications    Cardiovascular status: acceptable  Respiratory status: acceptable  Hydration status: acceptable

## 2022-10-03 NOTE — H&P
Orthopaedic Surgery  History & Physical  Dr. ELKIN Figueroa II  [phone]    HPI:  Patient is a 57 y.o. Not  or  male who presents with retained left leg external fixator    MEDICAL HISTORY  Past Medical History:   Diagnosis Date   • Anemia    • Anxiety    • Cataract     bilateral   • Closed displaced transverse fracture of left patella 08/12/2022   • Colitis    • Depression    • Disease of thyroid gland    • Diverticulitis    • Hard to intubate    • Hemorrhoids    • History of BPH    • Hydrocele in adult    • Hyperopia    • Hypertension    • Impulse control disorder    • Mental deficiency    • Seasonal allergies    • Shingles    ·   Past Surgical History:   Procedure Laterality Date   • BACK SURGERY      nodule removed   • ORCHIECTOMY Left    • ORIF HIP FRACTURE Right    • PATELLA OPEN REDUCTION INTERNAL FIXATION Left 8/15/2022    Procedure: PATELLA OPEN REDUCTION INTERNAL FIXATION REVISION AND TENDON REPAIR with EXTERNAL FIXATOR APPLICATOR;  Surgeon: Juan R Figueroa II, MD;  Location: [facility];  Service: Orthopedics;  Laterality: Left;   ·   Prior to Admission medications    Medication Sig Start Date End Date Taking? Authorizing Provider   acetaminophen (TYLENOL) 325 MG tablet Take 650 mg by mouth Every 6 (Six) Hours As Needed for Mild Pain . 1-2 Tablets   Yes Calvin Chester MD   amLODIPine (NORVASC) 5 MG tablet Take 5 mg by mouth Daily.   Yes Calvin Chester MD   aspirin  MG tablet Take 325 mg by mouth Daily. Will hold 10/1 until surgery   Yes Calvin Chester MD   bacitracin 500 UNIT/GM ointment Apply 1 application topically to the appropriate area as directed 2 (Two) Times a Day.   Yes Calvin Chester MD   clonazePAM (KlonoPIN) 1 MG tablet Take 1 mg by mouth 2 (Two) Times a Day.   Yes Calvin Chester MD   docusate sodium (COLACE) 100 MG capsule Take 100 mg by mouth 2 (Two) Times a Day.   Yes Calvin Chester MD   escitalopram (LEXAPRO)  20 MG tablet Take 20 mg by mouth Daily. 1/1/20  Yes Lorna Roberson MD   ferrous sulfate 325 (65 FE) MG tablet Take 1 tablet by mouth Daily With Breakfast. 8/24/22  Yes Matt Rincon APRN   hydroCHLOROthiazide (MICROZIDE) 12.5 MG capsule Take 12.5 mg by mouth Daily.   Yes Calvin Chester MD   HYDROcodone-acetaminophen (NORCO) 5-325 MG per tablet Take 1 tablet by mouth Every 4 (Four) Hours As Needed.   Yes Calvin Chester MD   levothyroxine (SYNTHROID, LEVOTHROID) 125 MCG tablet Take 137 mcg by mouth Daily. 1/1/20  Yes Maki Bal APRN   loperamide (IMODIUM) 2 MG capsule Take 2 mg by mouth 4 (Four) Times a Day As Needed for Diarrhea.   Yes Calvin Chester MD   melatonin 5 MG tablet tablet Take 5 mg by mouth At Night As Needed.   Yes Calvin Chester MD   ondansetron (ZOFRAN) 4 MG tablet Take 4 mg by mouth Every 4 (Four) Hours As Needed for Nausea or Vomiting.   Yes Calvin Chester MD   oxybutynin XL (DITROPAN-XL) 5 MG 24 hr tablet Take 5 mg by mouth 2 (Two) Times a Day.   Yes Calvin Chester MD   paliperidone (INVEGA) 6 MG 24 hr tablet Take 12 mg by mouth Daily. 1/1/20  Yes Lorna Roberson MD   pseudoephedrine (SUDAFED) 120 MG 12 hr tablet Take 120 mg by mouth Every 12 (Twelve) Hours As Needed for Congestion.   Yes Calvin Chester MD   tamsulosin (FLOMAX) 0.4 MG capsule 24 hr capsule Take 1 capsule by mouth Daily.   Yes Lindsay Juarez MD   Zinc Oxide 25 % paste Apply 1 application topically to the appropriate area as directed 2 (Two) Times a Day. Apply To Buttocks   Yes Calvin Chester MD   ·   · No Known Allergies  Most Recent Immunizations   Administered Date(s) Administered   • COVID-19 (PFIZER) PURPLE CAP 03/16/2021   • Flu Vaccine Split Quad 10/22/2020   • Hepatitis A 01/07/2019   • Influenza Quad Vaccine (Inpatient) 10/10/2014   • Influenza, Unspecified 08/10/2022   • PPD Test 08/31/2004   • Pneumococcal Polysaccharide (PPSV23) 04/21/2015   •  "Shingrix 01/30/2020   ·   Social History     Tobacco Use   • Smoking status: Never Smoker   • Smokeless tobacco: Not on file   Substance Use Topics   • Alcohol use: Never   ·    Social History     Substance and Sexual Activity   Drug Use Never   ·     REVIEW OF SYSTEMS:  · Head: negative for headache  · Respiratory: negative for shortness of breath.   · Cardiovascular: negative for chest pain.   · Gastrointestinal: negative abdominal pain.   · Neurological: negative for LOC  · Psychiatric/Behavioral: negative for memory loss.   · All other systems reviewed and are negative    VITALS: /79 (BP Location: Right arm, Patient Position: Lying)   Pulse 91   Temp 97.4 °F (36.3 °C) (Oral)   Resp 16   Ht 172.7 cm (67.99\")   SpO2 93%   BMI 25.65 kg/m²  Body mass index is 25.65 kg/m².    PHYSICAL EXAM:   · CONSTITUTIONAL: A&Ox3, No acute distress  · LUNGS: Equal chest rise, no shortness of air  · CARDIOVASCULAR: palpable peripheral pulses  · SKIN: no skin lesions in the area examined  · LYMPH: no lymphadenopathy in the area examined    RADIOLOGY REVIEW:   No radiology results for the last 7 days    LABS:   Results for the past 24 hours: No results found for this or any previous visit (from the past 24 hour(s)).    IMPRESSION:  Patient is a 57 y.o. Not  or  male with retained left leg external fixator    PLAN:   · Admited to: Juan R Figueroa II, MD  · Disposition: Left leg removal of external fixator today in the OR    R \"Huang\" Carolina WHEATLEY MD  Orthopaedic Surgery  [facility]  [phone] - [place] Office  [phone] - [place] Office    "

## 2022-10-03 NOTE — OP NOTE
"  Hardware Removal Operative Note  Dr. ELKIN Figueroa II  [phone]    PATIENT NAME: Lucien Morrison  MRN: [id number]  : 1965 AGE: 57 y.o. GENDER: male  DATE OF OPERATION: 10/3/2022  PREOPERATIVE DIAGNOSIS: Retained left leg external fixator   POSTOPERATIVE DIAGNOSIS: Same  OPERATION PERFORMED: Hardware Removal Left Leg  SURGEON: Juan R Figueroa MD  Circulator: Marjorie Williamson RN  Scrub Person: John Humphries  ANESTHESIA: General  ASSISTANT: None   ESTIMATED BLOOD LOSS: Minimal  SPONGE AND NEEDLE COUNT: Correct  INDICATIONS: This patient had a retained external fixator after a fracture for his patella. The risks of surgery were discussed and included the risk of anesthesia, infection, damage to neurovascular structures, fracture, inability to removal all hardware, the need for further procedures, medical complications, and others. No guarantees were made. The patient wished to proceed with surgery and a surgical consent was signed.    PERTINENT FINDINGS: Symptomatic Hardware    DETAILS OF PROCEDURE:  The patient was met in the preoperative area. The site was marked. The consent and H&P were reviewed. The patient was then wheeled back to the operative suite and transferred to the operative table. The patient underwent anesthesia.     I then loosened the external fixator device and removed all the bars.  I was then able to remove the pins without any damage.  A dressing was placed over the pin sites and the patient was taken to the recovery room in good condition.  There were no complications      R \"Huang\"Carolina WHEATLEY MD  Orthopaedic Surgery  [facility]  [phone]    "

## 2022-10-03 NOTE — ANESTHESIA POSTPROCEDURE EVALUATION
"Patient: Lucien Morrison    Procedure Summary     Date: 10/03/22 Room / Location: [facility] OR [address] [place] [address] [facility]    Anesthesia Start: 1415 Anesthesia Stop: 1446    Procedure: REMOVAL LEFT LEG EXTERNAL  FIXATOR (Left Ankle) Diagnosis:     Surgeons: Juan R Figueroa II, MD Provider: Junior Villa MD    Anesthesia Type: general ASA Status: 3          Anesthesia Type: general    Vitals  Vitals Value Taken Time   /75 10/03/22 1500   Temp 36.4 °C (97.6 °F) 10/03/22 1443   Pulse 78 10/03/22 1507   Resp 16 10/03/22 1500   SpO2 97 % 10/03/22 1507   Vitals shown include unvalidated device data.        Post Anesthesia Care and Evaluation    Patient location during evaluation: PACU  Patient participation: complete - patient participated  Level of consciousness: awake  Pain management: adequate    Airway patency: patent  Anesthetic complications: No anesthetic complications    Cardiovascular status: acceptable  Respiratory status: acceptable  Hydration status: acceptable    Comments: /91 (BP Location: Left arm, Patient Position: Sitting)   Pulse 71   Temp 36.4 °C (97.6 °F) (Oral)   Resp 16   Ht 172.7 cm (67.99\")   SpO2 95%   BMI 25.65 kg/m²         "

## 2022-10-28 ENCOUNTER — LAB REQUISITION (OUTPATIENT)
Dept: LAB | Facility: HOSPITAL | Age: 57
End: 2022-10-28

## 2022-10-28 DIAGNOSIS — M25.569 PAIN IN UNSPECIFIED KNEE: ICD-10-CM

## 2022-10-28 PROCEDURE — 87147 CULTURE TYPE IMMUNOLOGIC: CPT | Performed by: ORTHOPAEDIC SURGERY

## 2022-10-28 PROCEDURE — 87205 SMEAR GRAM STAIN: CPT | Performed by: ORTHOPAEDIC SURGERY

## 2022-10-28 PROCEDURE — 87070 CULTURE OTHR SPECIMN AEROBIC: CPT | Performed by: ORTHOPAEDIC SURGERY

## 2022-10-28 PROCEDURE — 87186 SC STD MICRODIL/AGAR DIL: CPT | Performed by: ORTHOPAEDIC SURGERY

## 2022-10-30 LAB
BACTERIA SPEC AEROBE CULT: ABNORMAL
GRAM STN SPEC: ABNORMAL
GRAM STN SPEC: ABNORMAL

## 2022-11-01 ENCOUNTER — HOSPITAL ENCOUNTER (INPATIENT)
Facility: HOSPITAL | Age: 57
LOS: 6 days | Discharge: NURSING, REHAB OR LONG-TERM CARE | End: 2022-11-09
Attending: EMERGENCY MEDICINE | Admitting: INTERNAL MEDICINE

## 2022-11-01 DIAGNOSIS — L02.416 ABSCESS OF KNEE, LEFT: ICD-10-CM

## 2022-11-01 DIAGNOSIS — Z98.890 POST-OPERATIVE STATE: Primary | ICD-10-CM

## 2022-11-01 LAB
ALBUMIN SERPL-MCNC: 3.4 G/DL (ref 3.5–5.2)
ALBUMIN/GLOB SERPL: 0.8 G/DL
ALP SERPL-CCNC: 87 U/L (ref 39–117)
ALT SERPL W P-5'-P-CCNC: 10 U/L (ref 1–41)
ANION GAP SERPL CALCULATED.3IONS-SCNC: 7 MMOL/L (ref 5–15)
AST SERPL-CCNC: 12 U/L (ref 1–40)
BASOPHILS # BLD AUTO: 0.05 10*3/MM3 (ref 0–0.2)
BASOPHILS NFR BLD AUTO: 0.6 % (ref 0–1.5)
BILIRUB SERPL-MCNC: <0.2 MG/DL (ref 0–1.2)
BUN SERPL-MCNC: 14 MG/DL (ref 6–20)
BUN/CREAT SERPL: 18.2 (ref 7–25)
CALCIUM SPEC-SCNC: 9.4 MG/DL (ref 8.6–10.5)
CHLORIDE SERPL-SCNC: 100 MMOL/L (ref 98–107)
CO2 SERPL-SCNC: 33 MMOL/L (ref 22–29)
CREAT SERPL-MCNC: 0.77 MG/DL (ref 0.76–1.27)
CRP SERPL-MCNC: 1.53 MG/DL (ref 0–0.5)
D-LACTATE SERPL-SCNC: 1.1 MMOL/L (ref 0.5–2)
DEPRECATED RDW RBC AUTO: 44.6 FL (ref 37–54)
EGFRCR SERPLBLD CKD-EPI 2021: 104.4 ML/MIN/1.73
EOSINOPHIL # BLD AUTO: 0.33 10*3/MM3 (ref 0–0.4)
EOSINOPHIL NFR BLD AUTO: 3.6 % (ref 0.3–6.2)
ERYTHROCYTE [DISTWIDTH] IN BLOOD BY AUTOMATED COUNT: 13.3 % (ref 12.3–15.4)
ERYTHROCYTE [SEDIMENTATION RATE] IN BLOOD: 40 MM/HR (ref 0–20)
GLOBULIN UR ELPH-MCNC: 4.3 GM/DL
GLUCOSE SERPL-MCNC: 111 MG/DL (ref 65–99)
HCT VFR BLD AUTO: 34.4 % (ref 37.5–51)
HGB BLD-MCNC: 11 G/DL (ref 13–17.7)
IMM GRANULOCYTES # BLD AUTO: 0.03 10*3/MM3 (ref 0–0.05)
IMM GRANULOCYTES NFR BLD AUTO: 0.3 % (ref 0–0.5)
LYMPHOCYTES # BLD AUTO: 1.9 10*3/MM3 (ref 0.7–3.1)
LYMPHOCYTES NFR BLD AUTO: 21 % (ref 19.6–45.3)
MCH RBC QN AUTO: 29.1 PG (ref 26.6–33)
MCHC RBC AUTO-ENTMCNC: 32 G/DL (ref 31.5–35.7)
MCV RBC AUTO: 91 FL (ref 79–97)
MONOCYTES # BLD AUTO: 0.58 10*3/MM3 (ref 0.1–0.9)
MONOCYTES NFR BLD AUTO: 6.4 % (ref 5–12)
NEUTROPHILS NFR BLD AUTO: 6.16 10*3/MM3 (ref 1.7–7)
NEUTROPHILS NFR BLD AUTO: 68.1 % (ref 42.7–76)
NRBC BLD AUTO-RTO: 0 /100 WBC (ref 0–0.2)
PLATELET # BLD AUTO: 471 10*3/MM3 (ref 140–450)
PMV BLD AUTO: 9 FL (ref 6–12)
POTASSIUM SERPL-SCNC: 3.5 MMOL/L (ref 3.5–5.2)
PROT SERPL-MCNC: 7.7 G/DL (ref 6–8.5)
RBC # BLD AUTO: 3.78 10*6/MM3 (ref 4.14–5.8)
SODIUM SERPL-SCNC: 140 MMOL/L (ref 136–145)
WBC NRBC COR # BLD: 9.05 10*3/MM3 (ref 3.4–10.8)

## 2022-11-01 PROCEDURE — 80053 COMPREHEN METABOLIC PANEL: CPT | Performed by: EMERGENCY MEDICINE

## 2022-11-01 PROCEDURE — 25010000002 CEFAZOLIN IN DEXTROSE 2-4 GM/100ML-% SOLUTION: Performed by: EMERGENCY MEDICINE

## 2022-11-01 PROCEDURE — 87205 SMEAR GRAM STAIN: CPT | Performed by: EMERGENCY MEDICINE

## 2022-11-01 PROCEDURE — 99284 EMERGENCY DEPT VISIT MOD MDM: CPT

## 2022-11-01 PROCEDURE — 87070 CULTURE OTHR SPECIMN AEROBIC: CPT | Performed by: EMERGENCY MEDICINE

## 2022-11-01 PROCEDURE — 83605 ASSAY OF LACTIC ACID: CPT | Performed by: EMERGENCY MEDICINE

## 2022-11-01 PROCEDURE — 85025 COMPLETE CBC W/AUTO DIFF WBC: CPT | Performed by: EMERGENCY MEDICINE

## 2022-11-01 PROCEDURE — 87147 CULTURE TYPE IMMUNOLOGIC: CPT | Performed by: EMERGENCY MEDICINE

## 2022-11-01 PROCEDURE — G0378 HOSPITAL OBSERVATION PER HR: HCPCS

## 2022-11-01 PROCEDURE — 87150 DNA/RNA AMPLIFIED PROBE: CPT | Performed by: EMERGENCY MEDICINE

## 2022-11-01 PROCEDURE — 87186 SC STD MICRODIL/AGAR DIL: CPT | Performed by: EMERGENCY MEDICINE

## 2022-11-01 PROCEDURE — 86140 C-REACTIVE PROTEIN: CPT | Performed by: EMERGENCY MEDICINE

## 2022-11-01 PROCEDURE — 85652 RBC SED RATE AUTOMATED: CPT | Performed by: EMERGENCY MEDICINE

## 2022-11-01 PROCEDURE — 87040 BLOOD CULTURE FOR BACTERIA: CPT | Performed by: EMERGENCY MEDICINE

## 2022-11-01 RX ORDER — NITROGLYCERIN 0.4 MG/1
0.4 TABLET SUBLINGUAL
Status: DISCONTINUED | OUTPATIENT
Start: 2022-11-01 | End: 2022-11-09 | Stop reason: HOSPADM

## 2022-11-01 RX ORDER — CIPROFLOXACIN 500 MG/1
500 TABLET, FILM COATED ORAL 2 TIMES DAILY
COMMUNITY
Start: 2022-10-26 | End: 2022-11-09 | Stop reason: HOSPADM

## 2022-11-01 RX ORDER — CEFAZOLIN SODIUM 2 G/100ML
2 INJECTION, SOLUTION INTRAVENOUS EVERY 8 HOURS
Status: DISCONTINUED | OUTPATIENT
Start: 2022-11-02 | End: 2022-11-09 | Stop reason: HOSPADM

## 2022-11-01 RX ORDER — ONDANSETRON 2 MG/ML
4 INJECTION INTRAMUSCULAR; INTRAVENOUS EVERY 6 HOURS PRN
Status: DISCONTINUED | OUTPATIENT
Start: 2022-11-01 | End: 2022-11-09 | Stop reason: HOSPADM

## 2022-11-01 RX ORDER — ONDANSETRON 4 MG/1
4 TABLET, FILM COATED ORAL EVERY 6 HOURS PRN
Status: DISCONTINUED | OUTPATIENT
Start: 2022-11-01 | End: 2022-11-09 | Stop reason: HOSPADM

## 2022-11-01 RX ORDER — SODIUM CHLORIDE 0.9 % (FLUSH) 0.9 %
10 SYRINGE (ML) INJECTION AS NEEDED
Status: DISCONTINUED | OUTPATIENT
Start: 2022-11-01 | End: 2022-11-09 | Stop reason: HOSPADM

## 2022-11-01 RX ORDER — BISACODYL 5 MG/1
5 TABLET, DELAYED RELEASE ORAL DAILY PRN
COMMUNITY
End: 2022-11-09 | Stop reason: HOSPADM

## 2022-11-01 RX ORDER — CEFAZOLIN SODIUM 2 G/100ML
2 INJECTION, SOLUTION INTRAVENOUS ONCE
Status: COMPLETED | OUTPATIENT
Start: 2022-11-01 | End: 2022-11-01

## 2022-11-01 RX ORDER — UREA 10 %
3 LOTION (ML) TOPICAL NIGHTLY PRN
Status: DISCONTINUED | OUTPATIENT
Start: 2022-11-01 | End: 2022-11-09 | Stop reason: HOSPADM

## 2022-11-01 RX ORDER — ACETAMINOPHEN 325 MG/1
650 TABLET ORAL EVERY 4 HOURS PRN
Status: DISCONTINUED | OUTPATIENT
Start: 2022-11-01 | End: 2022-11-09 | Stop reason: HOSPADM

## 2022-11-01 RX ADMIN — CEFAZOLIN SODIUM 2 G: 2 INJECTION, SOLUTION INTRAVENOUS at 17:51

## 2022-11-01 NOTE — ED NOTES
Pt via [facility]    This RN received a call from ZAID Yu at [facility] reporting that they are sending pt here for possible post op knee infection. RN reports that pt has had a few surgeries to L knee, most recent about 3 weeks ago, site is now swollen, red, warm to touch, with red color drainage.   Pt is a patient of Dr. Garcia   Per RN pt is AAOX4     [facility] ([phone])

## 2022-11-01 NOTE — ED NOTES
Pt arrived via ems from [facility]. Pt c/o leg pain. Pt A&Ox3. Attempted to contact [facility] to gather more information.    This RN wore mask, gloves, eyewear and all other appropriate PPE while performing patient care.

## 2022-11-01 NOTE — ED PROVIDER NOTES
EMERGENCY DEPARTMENT ENCOUNTER    Room Number:  30/30  Date of encounter:  11/1/2022  PCP: Ran Bragg MD  Patient Care Team:  Ran Bragg MD as PCP - General (Internal Medicine)   Historian: EMS, nursing home records    HPI:  Chief Complaint: Knee infection  A complete HPI/ROS/PMH/PSH/SH/FH are unobtainable due to: Mental deficiency    Context: Lucien Morrison is a 57 y.o. male who presents to the ED c/o left knee swelling and redness.  The nursing home reports that about 3 weeks ago he had a surgery to his left knee.  They note redness and swelling as well as drainage from the left knee.  The patient is not able provide any history other than that the knee does not hurt him.    Prior record review: Operative note dated 10/3/2022 with a retrained external fixator after a fracture of his patella    PAST MEDICAL HISTORY  Active Ambulatory Problems     Diagnosis Date Noted   • Closed displaced transverse fracture of left patella 08/12/2022   • Anxiety    • Depression    • Disease of thyroid gland    • Hypertension    • History of BPH    • Mental deficiency      Resolved Ambulatory Problems     Diagnosis Date Noted   • No Resolved Ambulatory Problems     Past Medical History:   Diagnosis Date   • Anemia    • Cataract    • Colitis    • Diverticulitis    • Hard to intubate    • Hemorrhoids    • Hydrocele in adult    • Hyperopia    • Impulse control disorder    • Seasonal allergies    • Shingles        The patient has started, but not completed, their COVID-19 vaccination series.    PAST SURGICAL HISTORY  Past Surgical History:   Procedure Laterality Date   • BACK SURGERY      nodule removed   • HARDWARE REMOVAL Left 10/3/2022    Procedure: REMOVAL LEFT LEG EXTERNAL  FIXATOR;  Surgeon: Juan R Figueroa II, MD;  Location: [facility];  Service: Orthopedics;  Laterality: Left;   • ORCHIECTOMY Left    • ORIF HIP FRACTURE Right    • PATELLA OPEN REDUCTION INTERNAL FIXATION Left 8/15/2022    Procedure:  PATELLA OPEN REDUCTION INTERNAL FIXATION REVISION AND TENDON REPAIR with EXTERNAL FIXATOR APPLICATOR;  Surgeon: Juan R Figueroa II, MD;  Location: [facility] OR;  Service: Orthopedics;  Laterality: Left;         FAMILY HISTORY  Family History   Family history unknown: Yes         SOCIAL HISTORY  Social History     Socioeconomic History   • Marital status: Single   Tobacco Use   • Smoking status: Never   Vaping Use   • Vaping Use: Never used   Substance and Sexual Activity   • Alcohol use: Never   • Drug use: Never   • Sexual activity: Defer         ALLERGIES  Patient has no known allergies.        REVIEW OF SYSTEMS  Review of Systems   Not available due to mental deficiency  All systems reviewed and negative except for those discussed in HPI.       PHYSICAL EXAM    I have reviewed the triage vital signs and nursing notes.    ED Triage Vitals [11/01/22 1559]   Temp Heart Rate Resp BP SpO2   98.8 °F (37.1 °C) 72 18 132/82 95 %      Temp src Heart Rate Source Patient Position BP Location FiO2 (%)   Oral -- -- -- --       Physical Exam  GENERAL: Awake, alert, no acute distress  SKIN: Warm, dry  HENT: Normocephalic, atraumatic  EYES: no scleral icterus  CV: regular rhythm, regular rate  RESPIRATORY: normal effort, lungs clear  ABDOMEN: soft, nontender, nondistended  MUSCULOSKELETAL: Left knee has an open wound which is draining bloody thick drainage.  He has surrounding swelling and erythema.  Picture included below.  Distal pulses intact.  Sensation and motor intact.  NEURO: alert, moves all extremities, follows commands              LAB RESULTS  Recent Results (from the past 24 hour(s))   Comprehensive Metabolic Panel    Collection Time: 11/01/22  4:53 PM    Specimen: Blood   Result Value Ref Range    Glucose 111 (H) 65 - 99 mg/dL    BUN 14 6 - 20 mg/dL    Creatinine 0.77 0.76 - 1.27 mg/dL    Sodium 140 136 - 145 mmol/L    Potassium 3.5 3.5 - 5.2 mmol/L    Chloride 100 98 - 107 mmol/L    CO2 33.0 (H) 22.0 -  29.0 mmol/L    Calcium 9.4 8.6 - 10.5 mg/dL    Total Protein 7.7 6.0 - 8.5 g/dL    Albumin 3.40 (L) 3.50 - 5.20 g/dL    ALT (SGPT) 10 1 - 41 U/L    AST (SGOT) 12 1 - 40 U/L    Alkaline Phosphatase 87 39 - 117 U/L    Total Bilirubin <0.2 0.0 - 1.2 mg/dL    Globulin 4.3 gm/dL    A/G Ratio 0.8 g/dL    BUN/Creatinine Ratio 18.2 7.0 - 25.0    Anion Gap 7.0 5.0 - 15.0 mmol/L    eGFR 104.4 >60.0 mL/min/1.73   Lactic Acid, Plasma    Collection Time: 11/01/22  4:53 PM    Specimen: Blood   Result Value Ref Range    Lactate 1.1 0.5 - 2.0 mmol/L   C-reactive Protein    Collection Time: 11/01/22  4:53 PM    Specimen: Blood   Result Value Ref Range    C-Reactive Protein 1.53 (H) 0.00 - 0.50 mg/dL   Sedimentation Rate    Collection Time: 11/01/22  4:53 PM    Specimen: Blood   Result Value Ref Range    Sed Rate 40 (H) 0 - 20 mm/hr   CBC Auto Differential    Collection Time: 11/01/22  4:53 PM    Specimen: Blood   Result Value Ref Range    WBC 9.05 3.40 - 10.80 10*3/mm3    RBC 3.78 (L) 4.14 - 5.80 10*6/mm3    Hemoglobin 11.0 (L) 13.0 - 17.7 g/dL    Hematocrit 34.4 (L) 37.5 - 51.0 %    MCV 91.0 79.0 - 97.0 fL    MCH 29.1 26.6 - 33.0 pg    MCHC 32.0 31.5 - 35.7 g/dL    RDW 13.3 12.3 - 15.4 %    RDW-SD 44.6 37.0 - 54.0 fl    MPV 9.0 6.0 - 12.0 fL    Platelets 471 (H) 140 - 450 10*3/mm3    Neutrophil % 68.1 42.7 - 76.0 %    Lymphocyte % 21.0 19.6 - 45.3 %    Monocyte % 6.4 5.0 - 12.0 %    Eosinophil % 3.6 0.3 - 6.2 %    Basophil % 0.6 0.0 - 1.5 %    Immature Grans % 0.3 0.0 - 0.5 %    Neutrophils, Absolute 6.16 1.70 - 7.00 10*3/mm3    Lymphocytes, Absolute 1.90 0.70 - 3.10 10*3/mm3    Monocytes, Absolute 0.58 0.10 - 0.90 10*3/mm3    Eosinophils, Absolute 0.33 0.00 - 0.40 10*3/mm3    Basophils, Absolute 0.05 0.00 - 0.20 10*3/mm3    Immature Grans, Absolute 0.03 0.00 - 0.05 10*3/mm3    nRBC 0.0 0.0 - 0.2 /100 WBC       Ordered the above labs and independently reviewed the results.        RADIOLOGY  No Radiology Exams Resulted Within Past  24 Hours    I ordered the above noted radiological studies. Reviewed by me and discussed with radiologist.  See dictation for official radiology interpretation.      PROCEDURES    Procedures      MEDICATIONS GIVEN IN ER    Medications   sodium chloride 0.9 % flush 10 mL (has no administration in time range)   ceFAZolin in dextrose (ANCEF) IVPB solution 2 g (2 g Intravenous New Bag 11/1/22 1751)         PROGRESS, DATA ANALYSIS, CONSULTS, AND MEDICAL DECISION MAKING    All labs have been independently reviewed by me.  All radiology studies have been reviewed by me and discussed with radiologist dictating the report.   EKG's independently viewed and interpreted by me.  Discussion below represents my analysis of pertinent findings related to patient's condition, differential diagnosis, treatment plan and final disposition.    Differential diagnosis includes but is not limited to cellulitis, abscess, infectious arthritis, sepsis.    ED Course as of 11/01/22 1755   Tue Nov 01, 2022   1635 Wound culture from 10/28/2022 shows MSSA.  Starting antibiotics. [TR]   1754 Speaking with Dr. Peralta with [facility].  Will admit. [TR]   1754 His inflammatory markers are elevated.  I can express pus from this wound.  Plan to admit him for IV antibiotics. [TR]      ED Course User Index  [TR] Alexander Martell MD           PPE: The patient wore a mask and I wore an N95 mask throughout the entire patient encounter.       AS OF 17:55 EDT VITALS:    BP - 128/71  HR - 93  TEMP - 99.9 °F (37.7 °C) (Tympanic)  O2 SATS - 91%        DIAGNOSIS  Final diagnoses:   Post-operative state   Abscess of knee, left         DISPOSITION  ED Disposition     ED Disposition   Decision to Admit    Condition   --    Comment   Level of Care: Telemetry [5]   Diagnosis: Post-operative state [532712]   Admitting Physician: MARGY PERALTA [[id number]]   Attending Physician: MARGY PERLATA [[id number]]                  Note Disclaimer: [facility], we believe that  sharing information builds trust and better relationships. You are receiving this note because you recently visited [facility]. It is possible you will see health information before a provider has talked with you about it. This kind of information can be easy to misunderstand. To help you fully understand what it means for your health, we urge you to discuss this note with your provider.       Alexander Martell MD  11/01/22 8151

## 2022-11-01 NOTE — ED NOTES
Nursing report ED to floor  Lucien Morrison  57 y.o.  male    HPI :   Chief Complaint   Patient presents with    Post-op Problem       Admitting doctor:   Michela Peralta MD    Admitting diagnosis:   The primary encounter diagnosis was Post-operative state. A diagnosis of Abscess of knee, left was also pertinent to this visit.    Code status:   Current Code Status       Date Active Code Status Order ID Comments User Context       11/1/2022 1816 CPR (Attempt to Resuscitate) [id number]  Michela Peralta MD ED        Question Answer    Code Status (Patient has no pulse and is not breathing) CPR (Attempt to Resuscitate)    Medical Interventions (Patient has pulse or is breathing) Full                    Allergies:   Patient has no known allergies.    Isolation:   No active isolations    Intake and Output    Intake/Output Summary (Last 24 hours) at 11/1/2022 1828  Last data filed at 11/1/2022 1827  Gross per 24 hour   Intake 100 ml   Output --   Net 100 ml       Weight:   There were no vitals filed for this visit.    Most recent vitals:   Vitals:    11/01/22 1731 11/01/22 1732 11/01/22 1759 11/01/22 1801   BP: 128/71   152/83   Pulse:  93 95    Resp:       Temp:       TempSrc:       SpO2:  91% 90%        Active LDAs/IV Access:   Lines, Drains & Airways       Active LDAs       Name Placement date Placement time Site Days    Peripheral IV 11/01/22 1648 Left Antecubital 11/01/22  1648  Antecubital  less than 1    Peripheral IV 11/01/22 1727 Posterior;Right Hand 11/01/22  1727  Hand  less than 1    External Urinary Catheter 08/18/22  1317  --  75                    Labs (abnormal labs have a star):   Labs Reviewed   COMPREHENSIVE METABOLIC PANEL - Abnormal; Notable for the following components:       Result Value    Glucose 111 (*)     CO2 33.0 (*)     Albumin 3.40 (*)     All other components within normal limits    Narrative:     GFR Normal >60  Chronic Kidney Disease <60  Kidney Failure <15     C-REACTIVE  PROTEIN - Abnormal; Notable for the following components:    C-Reactive Protein 1.53 (*)     All other components within normal limits   SEDIMENTATION RATE - Abnormal; Notable for the following components:    Sed Rate 40 (*)     All other components within normal limits   CBC WITH AUTO DIFFERENTIAL - Abnormal; Notable for the following components:    RBC 3.78 (*)     Hemoglobin 11.0 (*)     Hematocrit 34.4 (*)     Platelets 471 (*)     All other components within normal limits   LACTIC ACID, PLASMA - Normal   BLOOD CULTURE   BLOOD CULTURE   WOUND CULTURE   CBC AND DIFFERENTIAL    Narrative:     The following orders were created for panel order CBC & Differential.  Procedure                               Abnormality         Status                     ---------                               -----------         ------                     CBC Auto Differential[[id number]]        Abnormal            Final result                 Please view results for these tests on the individual orders.       EKG:   No orders to display       Meds given in ED:   Medications   sodium chloride 0.9 % flush 10 mL (has no administration in time range)   nitroglycerin (NITROSTAT) SL tablet 0.4 mg (has no administration in time range)   acetaminophen (TYLENOL) tablet 650 mg (has no administration in time range)   ondansetron (ZOFRAN) tablet 4 mg (has no administration in time range)     Or   ondansetron (ZOFRAN) injection 4 mg (has no administration in time range)   melatonin tablet 3 mg (has no administration in time range)   ceFAZolin in dextrose (ANCEF) IVPB solution 2 g (0 g Intravenous Stopped 11/1/22 2591)       Imaging results:  No radiology results for the last day    Ambulatory status:   - assist    Social issues:   Social History     Socioeconomic History    Marital status: Single   Tobacco Use    Smoking status: Never   Vaping Use    Vaping Use: Never used   Substance and Sexual Activity    Alcohol use: Never    Drug use: Never     Sexual activity: Defer       NIH Stroke Scale:         Daija Conrad RN  11/01/22 18:28 EDT

## 2022-11-01 NOTE — PROGRESS NOTES
Clinical Pharmacy Services: Medication History    Lucien Morrison is a 57 y.o. male presenting to [facility] for   Chief Complaint   Patient presents with   • Post-op Problem       He  has a past medical history of Anemia, Anxiety, Cataract, Closed displaced transverse fracture of left patella (08/12/2022), Colitis, Depression, Disease of thyroid gland, Diverticulitis, Hard to intubate, Hemorrhoids, History of BPH, Hydrocele in adult, Hyperopia, Hypertension, Impulse control disorder, Mental deficiency, Seasonal allergies, and Shingles.    Allergies as of 11/01/2022   • (No Known Allergies)       Medication information was obtained from: [facility] Paperwork  Pharmacy and Phone Number:     Prior to Admission Medications     Prescriptions Last Dose Informant Patient Reported? Taking?    acetaminophen (TYLENOL) 325 MG tablet  Nursing Home Yes Yes    Take 2 tablets by mouth Every 6 (Six) Hours As Needed for Mild Pain.    amLODIPine (NORVASC) 5 MG tablet 11/1/2022 Nursing Home Yes Yes    Take 5 mg by mouth Daily.    aspirin  MG tablet 11/1/2022 Nursing Home Yes Yes    Take 325 mg by mouth Daily. Will hold 10/1 until surgery    bisacodyl (DULCOLAX) 5 MG EC tablet  Nursing Home Yes Yes    Take 1 tablet by mouth Daily As Needed for Constipation.    ciprofloxacin (CIPRO) 500 MG tablet 11/1/2022 Nursing Home Yes Yes    Take 1 tablet by mouth 2 (Two) Times a Day.    clonazePAM (KlonoPIN) 1 MG tablet 11/1/2022 Nursing Home Yes Yes    Take 1 mg by mouth 2 (Two) Times a Day.    docusate sodium (COLACE) 100 MG capsule 11/1/2022 Nursing Home Yes Yes    Take 100 mg by mouth 2 (Two) Times a Day.    escitalopram (LEXAPRO) 20 MG tablet 11/1/2022 Nursing Home Yes Yes    Take 20 mg by mouth Daily.    ferrous sulfate 325 (65 FE) MG tablet 11/1/2022 Nursing Home No Yes    Take 1 tablet by mouth Daily With Breakfast.    hydroCHLOROthiazide (MICROZIDE) 12.5 MG capsule 11/1/2022 Nursing Home Yes Yes    Take 12.5 mg by  mouth Daily.    HYDROcodone-acetaminophen (NORCO) 5-325 MG per tablet  Nursing Home Yes Yes    Take 1 tablet by mouth Every 4 (Four) Hours As Needed.    levothyroxine (SYNTHROID, LEVOTHROID) 137 MCG tablet 11/1/2022 Nursing Home Yes Yes    Take 1 tablet by mouth Daily.    loperamide (IMODIUM) 2 MG capsule 7/27/2022 Nursing Home Yes Yes    Take 2 mg by mouth 4 (Four) Times a Day As Needed for Diarrhea.    melatonin 5 MG tablet tablet  Nursing Home Yes Yes    Take 5 mg by mouth At Night As Needed.    ondansetron (ZOFRAN) 4 MG tablet  Nursing Home Yes Yes    Take 4 mg by mouth Every 4 (Four) Hours As Needed for Nausea or Vomiting.    paliperidone (INVEGA) 6 MG 24 hr tablet 11/1/2022 Nursing Home Yes Yes    Take 12 mg by mouth Daily.    pseudoephedrine (SUDAFED) 120 MG 12 hr tablet 7/27/2022 Nursing Home Yes Yes    Take 120 mg by mouth Every 12 (Twelve) Hours As Needed for Congestion.    tamsulosin (FLOMAX) 0.4 MG capsule 24 hr capsule 10/31/2022 Nursing Home Yes Yes    Take 1 capsule by mouth Every Night.    Zinc Oxide 25 % paste  Nursing Home Yes Yes    Apply 1 application topically to the appropriate area as directed 2 (Two) Times a Day.            Medication notes:     This medication list is complete to the best of my knowledge as of 11/1/2022    Please call if questions.    Saira Heck  Medication History Technician   [phone]    11/1/2022 18:13 EDT

## 2022-11-02 PROBLEM — M00.9 INFECTION OF LEFT KNEE (HCC): Status: ACTIVE | Noted: 2022-11-02

## 2022-11-02 PROBLEM — A49.01 MSSA (METHICILLIN SUSCEPTIBLE STAPHYLOCOCCUS AUREUS) INFECTION: Status: ACTIVE | Noted: 2022-11-02

## 2022-11-02 LAB
ANION GAP SERPL CALCULATED.3IONS-SCNC: 7.7 MMOL/L (ref 5–15)
BUN SERPL-MCNC: 12 MG/DL (ref 6–20)
BUN/CREAT SERPL: 19.4 (ref 7–25)
CALCIUM SPEC-SCNC: 9.2 MG/DL (ref 8.6–10.5)
CHLORIDE SERPL-SCNC: 102 MMOL/L (ref 98–107)
CO2 SERPL-SCNC: 30.3 MMOL/L (ref 22–29)
CREAT SERPL-MCNC: 0.62 MG/DL (ref 0.76–1.27)
DEPRECATED RDW RBC AUTO: 44.3 FL (ref 37–54)
EGFRCR SERPLBLD CKD-EPI 2021: 111.5 ML/MIN/1.73
ERYTHROCYTE [DISTWIDTH] IN BLOOD BY AUTOMATED COUNT: 13.4 % (ref 12.3–15.4)
GLUCOSE SERPL-MCNC: 90 MG/DL (ref 65–99)
HBA1C MFR BLD: 6 % (ref 4.8–5.6)
HCT VFR BLD AUTO: 30 % (ref 37.5–51)
HGB BLD-MCNC: 9.7 G/DL (ref 13–17.7)
MCH RBC QN AUTO: 29.2 PG (ref 26.6–33)
MCHC RBC AUTO-ENTMCNC: 32.3 G/DL (ref 31.5–35.7)
MCV RBC AUTO: 90.4 FL (ref 79–97)
PLATELET # BLD AUTO: 420 10*3/MM3 (ref 140–450)
PMV BLD AUTO: 8.9 FL (ref 6–12)
POTASSIUM SERPL-SCNC: 3.5 MMOL/L (ref 3.5–5.2)
RBC # BLD AUTO: 3.32 10*6/MM3 (ref 4.14–5.8)
SODIUM SERPL-SCNC: 140 MMOL/L (ref 136–145)
WBC NRBC COR # BLD: 6.23 10*3/MM3 (ref 3.4–10.8)

## 2022-11-02 PROCEDURE — G0378 HOSPITAL OBSERVATION PER HR: HCPCS

## 2022-11-02 PROCEDURE — 92610 EVALUATE SWALLOWING FUNCTION: CPT

## 2022-11-02 PROCEDURE — 80048 BASIC METABOLIC PNL TOTAL CA: CPT | Performed by: INTERNAL MEDICINE

## 2022-11-02 PROCEDURE — 85027 COMPLETE CBC AUTOMATED: CPT | Performed by: INTERNAL MEDICINE

## 2022-11-02 PROCEDURE — 99223 1ST HOSP IP/OBS HIGH 75: CPT | Performed by: INTERNAL MEDICINE

## 2022-11-02 PROCEDURE — 36415 COLL VENOUS BLD VENIPUNCTURE: CPT | Performed by: INTERNAL MEDICINE

## 2022-11-02 PROCEDURE — 25010000002 CEFAZOLIN IN DEXTROSE 2-4 GM/100ML-% SOLUTION: Performed by: INTERNAL MEDICINE

## 2022-11-02 PROCEDURE — 83036 HEMOGLOBIN GLYCOSYLATED A1C: CPT | Performed by: INTERNAL MEDICINE

## 2022-11-02 RX ORDER — TAMSULOSIN HYDROCHLORIDE 0.4 MG/1
0.4 CAPSULE ORAL NIGHTLY
Status: DISCONTINUED | OUTPATIENT
Start: 2022-11-02 | End: 2022-11-09 | Stop reason: HOSPADM

## 2022-11-02 RX ORDER — HYDROCODONE BITARTRATE AND ACETAMINOPHEN 5; 325 MG/1; MG/1
1 TABLET ORAL EVERY 4 HOURS PRN
Status: DISCONTINUED | OUTPATIENT
Start: 2022-11-02 | End: 2022-11-09 | Stop reason: HOSPADM

## 2022-11-02 RX ORDER — AMLODIPINE BESYLATE 5 MG/1
5 TABLET ORAL DAILY
Status: DISCONTINUED | OUTPATIENT
Start: 2022-11-02 | End: 2022-11-09 | Stop reason: HOSPADM

## 2022-11-02 RX ORDER — ESCITALOPRAM OXALATE 20 MG/1
20 TABLET ORAL DAILY
Status: DISCONTINUED | OUTPATIENT
Start: 2022-11-02 | End: 2022-11-09 | Stop reason: HOSPADM

## 2022-11-02 RX ORDER — FERROUS SULFATE 325(65) MG
325 TABLET ORAL
Status: DISCONTINUED | OUTPATIENT
Start: 2022-11-02 | End: 2022-11-09 | Stop reason: HOSPADM

## 2022-11-02 RX ORDER — PALIPERIDONE 3 MG/1
12 TABLET, EXTENDED RELEASE ORAL DAILY
Status: DISCONTINUED | OUTPATIENT
Start: 2022-11-02 | End: 2022-11-09 | Stop reason: HOSPADM

## 2022-11-02 RX ORDER — ASPIRIN 325 MG
325 TABLET, DELAYED RELEASE (ENTERIC COATED) ORAL DAILY
Status: DISCONTINUED | OUTPATIENT
Start: 2022-11-02 | End: 2022-11-09 | Stop reason: HOSPADM

## 2022-11-02 RX ORDER — LEVOTHYROXINE SODIUM 137 UG/1
137 TABLET ORAL
Status: DISCONTINUED | OUTPATIENT
Start: 2022-11-02 | End: 2022-11-09 | Stop reason: HOSPADM

## 2022-11-02 RX ORDER — BISACODYL 5 MG/1
5 TABLET, DELAYED RELEASE ORAL DAILY PRN
Status: DISCONTINUED | OUTPATIENT
Start: 2022-11-02 | End: 2022-11-08

## 2022-11-02 RX ORDER — CLONAZEPAM 1 MG/1
1 TABLET ORAL 2 TIMES DAILY
Status: DISCONTINUED | OUTPATIENT
Start: 2022-11-02 | End: 2022-11-09 | Stop reason: HOSPADM

## 2022-11-02 RX ORDER — HYDROCHLOROTHIAZIDE 12.5 MG/1
12.5 TABLET ORAL DAILY
Status: DISCONTINUED | OUTPATIENT
Start: 2022-11-02 | End: 2022-11-09 | Stop reason: HOSPADM

## 2022-11-02 RX ORDER — DOCUSATE SODIUM 100 MG/1
100 CAPSULE, LIQUID FILLED ORAL 2 TIMES DAILY
Status: DISCONTINUED | OUTPATIENT
Start: 2022-11-02 | End: 2022-11-06

## 2022-11-02 RX ADMIN — HYDROCHLOROTHIAZIDE 12.5 MG: 12.5 TABLET ORAL at 09:49

## 2022-11-02 RX ADMIN — DOCUSATE SODIUM 100 MG: 100 CAPSULE, LIQUID FILLED ORAL at 09:48

## 2022-11-02 RX ADMIN — TAMSULOSIN HYDROCHLORIDE 0.4 MG: 0.4 CAPSULE ORAL at 21:07

## 2022-11-02 RX ADMIN — FERROUS SULFATE TAB 325 MG (65 MG ELEMENTAL FE) 325 MG: 325 (65 FE) TAB at 09:49

## 2022-11-02 RX ADMIN — AMLODIPINE BESYLATE 5 MG: 5 TABLET ORAL at 09:51

## 2022-11-02 RX ADMIN — PALIPERIDONE 12 MG: 6 TABLET, EXTENDED RELEASE ORAL at 09:49

## 2022-11-02 RX ADMIN — DOCUSATE SODIUM 100 MG: 100 CAPSULE, LIQUID FILLED ORAL at 21:10

## 2022-11-02 RX ADMIN — ASPIRIN 325 MG: 325 TABLET, COATED ORAL at 09:48

## 2022-11-02 RX ADMIN — CEFAZOLIN SODIUM 2 G: 2 INJECTION, SOLUTION INTRAVENOUS at 04:00

## 2022-11-02 RX ADMIN — Medication 10 ML: at 10:04

## 2022-11-02 RX ADMIN — LEVOTHYROXINE SODIUM 137 MCG: 0.14 TABLET ORAL at 06:21

## 2022-11-02 RX ADMIN — CEFAZOLIN SODIUM 2 G: 2 INJECTION, SOLUTION INTRAVENOUS at 19:12

## 2022-11-02 RX ADMIN — CEFAZOLIN SODIUM 2 G: 2 INJECTION, SOLUTION INTRAVENOUS at 11:49

## 2022-11-02 RX ADMIN — ESCITALOPRAM 20 MG: 20 TABLET, FILM COATED ORAL at 09:49

## 2022-11-02 RX ADMIN — CLONAZEPAM 1 MG: 1 TABLET ORAL at 09:49

## 2022-11-02 RX ADMIN — CLONAZEPAM 1 MG: 1 TABLET ORAL at 21:07

## 2022-11-02 NOTE — CASE MANAGEMENT/SOCIAL WORK
Discharge Planning Assessment  [facility]     Patient Name: Lucien Morrison  MRN: [id number]  Today's Date: 11/2/2022    Admit Date: 11/1/2022    Plan: Plans to return to [facility].  Follow up with admissions to confirm patient's return.   Discharge Needs Assessment     Row Name 11/02/22 1947       Living Environment    People in Home facility resident    Current Living Arrangements extended care facility    Primary Care Provided by other (see comments)  Facility staff    Provides Primary Care For no one, unable/limited ability to care for self    Family Caregiver if Needed sibling(s)    Family Caregiver Names brother Ken Morrison [phone]    Quality of Family Relationships helpful;involved;supportive    Able to Return to Prior Arrangements yes       Resource/Environmental Concerns    Resource/Environmental Concerns none    Transportation Concerns no car       Transition Planning    Patient/Family Anticipates Transition to long-term care facility    Transportation Anticipated other (see comments)  Facility wheelchair van or ambulance       Discharge Needs Assessment    Equipment Currently Used at Home hospital bed;wheelchair    Concerns to be Addressed discharge planning    Anticipated Changes Related to Illness inability to care for self    Equipment Needed After Discharge none    Discharge Facility/Level of Care Needs nursing facility, skilled;nursing facility, intermediate    Current Discharge Risk physical impairment;cognitively impaired               Discharge Plan     Row Name 11/02/22 1945       Plan    Plan Plans to return to [facility].  Follow up with admissions to confirm patient's return.    Provided Post Acute Provider List? N/A    N/A Provider List Comment Plans to return to [facility]    Provided Post Acute Provider Quality & Resource List? N/A    N/A Quality & Resource List Comment Plans to return to [facility].    Plan Comments Noted  that patient is confused and his brother Ken Morrison [phone] is listed as the patient’s Healthcare Representative on document in Epic.  Spoke to the patient’s brother who states that the patient is from [facility] [phone] and will return back there upon d/c. The patient’s brother states that the patient has been there a few months and is wheelchair bound.  Voicemail message was left for admissions at [facility] to confirm the patient’s level of care, bedhold status, COVID test requirements and name of the pharmacy. The patient’s brother requested for [facility] to inquire if [facility] can provide wheelchair van transport for the patient to return as he was informed that wheelchair van transport or ambulance will be needed for the patient upon d/c. The patient’s brother was informed that insurance coverage is not guaranteed for an ambulance transport.  The patient’s brother states that a voicemail message can be left for him informing him that the patient has return to [facility] if he does not answer his phone.  [facility] will follow up with [facility] to confirm patient’s return. ARTIS RAMIREZ              Continued Care and Services - Admitted Since 11/1/2022     Destination     Service Provider Request Status Selected Services Address Phone Fax Patient Preferred    [facility] [place] Pending - Request Sent N/A [address] [facility] [place] [address] [phone] --            Selected Continued Care - Prior Encounters Includes continued care and service providers with selected services from prior encounters from 8/3/2022 to 11/2/2022    Discharged on 8/23/2022 Admission date: 8/12/2022 - Discharge disposition: Skilled Nursing Facility (DC - External)    Destination     Service Provider Selected Services Address Phone Fax Patient Preferred    [facility] [place] Skilled Nursing [address] [facility] [place] [address]  [phone] --                       Demographic Summary     Row Name 11/02/22 1946       General Information    Admission Type observation    Arrived From long-term [facility]    Referral Source admission list    Reason for Consult discharge planning    Preferred Language English       Contact Information    Permission Granted to Share Info With family/designee               Functional Status     Row Name 11/02/22 1946       Functional Status    Usual Activity Tolerance poor    Current Activity Tolerance poor       Functional Status, IADL    Medications assistive equipment and person    Meal Preparation assistive equipment and person    Housekeeping assistive equipment and person    Laundry assistive equipment and person    Shopping assistive equipment and person       Mental Status Summary    Recent Changes in Mental Status/Cognitive Functioning unable to assess               Psychosocial    No documentation.                Abuse/Neglect    No documentation.                Legal    No documentation.                Substance Abuse    No documentation.                Patient Forms    No documentation.                   ARTIS Dow

## 2022-11-02 NOTE — CONSULTS
Orthopaedic Surgery  Consult Note  Dr. ELKIN Chu [facility]  [phone]    HPI:  Patient is a 57 y.o. Not  or  male who presents with right knee swelling and redness.  This patient has a history of a right patella fracture that was treated surgically by me.  Due to his intellectual disability he was unable to protect his repair and ended up with a wound complication from the brace rubbing on his wound along with a nonunion of the fracture due to early knee bending and stressing of the repair.  I ended up taking him back for revision repair with placement of external fixator to help allow for wound healing and closure.  The incision healed up well and I did end up taking off the external fixator about a month ago.  No incision was opened at that time.  Then a couple weeks later he developed some drainage with an open wound at his knee incision.  This was initially treated conservatively with some oral antibiotics and it did get better per the nursing home reports, but then it got worse over the past couple days and he was sent to the hospital yesterday with purulent drainage.  His inflammatory markers were noted to be mildly elevated.  He has a culture positive for staph that is pansensitive.  Cultures from yesterday are also growing staph at this time.  He is a very poor historian himself at all of this COVID stroke review of the medical records and my own personal knowledge of the patient.    MEDICAL HISTORY  Past Medical History:   Diagnosis Date   • Anemia    • Anxiety    • Cataract     bilateral   • Closed displaced transverse fracture of left patella 08/12/2022   • Colitis    • Depression    • Disease of thyroid gland    • Diverticulitis    • Hard to intubate    • Hemorrhoids    • History of BPH    • Hydrocele in adult    • Hyperopia    • Hypertension    • Impulse control disorder    • Mental deficiency    • Seasonal allergies    • Shingles    ·   Past Surgical History:   Procedure Laterality  Date   • BACK SURGERY      nodule removed   • HARDWARE REMOVAL Left 10/3/2022    Procedure: REMOVAL LEFT LEG EXTERNAL  FIXATOR;  Surgeon: Juan R Figueroa II, MD;  Location: [facility];  Service: Orthopedics;  Laterality: Left;   • ORCHIECTOMY Left    • ORIF HIP FRACTURE Right    • PATELLA OPEN REDUCTION INTERNAL FIXATION Left 8/15/2022    Procedure: PATELLA OPEN REDUCTION INTERNAL FIXATION REVISION AND TENDON REPAIR with EXTERNAL FIXATOR APPLICATOR;  Surgeon: Juan R Figueroa II, MD;  Location: [facility];  Service: Orthopedics;  Laterality: Left;   ·   Prior to Admission medications    Medication Sig Start Date End Date Taking? Authorizing Provider   acetaminophen (TYLENOL) 325 MG tablet Take 2 tablets by mouth Every 6 (Six) Hours As Needed for Mild Pain.   Yes Calvin Chester MD   amLODIPine (NORVASC) 5 MG tablet Take 5 mg by mouth Daily.   Yes Calvin Chester MD   aspirin  MG tablet Take 325 mg by mouth Daily. Will hold 10/1 until surgery   Yes Calvin Chester MD   bisacodyl (DULCOLAX) 5 MG EC tablet Take 1 tablet by mouth Daily As Needed for Constipation.   Yes Calvin Chester MD   ciprofloxacin (CIPRO) 500 MG tablet Take 1 tablet by mouth 2 (Two) Times a Day. 10/26/22 11/5/22 Yes Calvin Chester MD   clonazePAM (KlonoPIN) 1 MG tablet Take 1 mg by mouth 2 (Two) Times a Day.   Yes Calvin Chester MD   docusate sodium (COLACE) 100 MG capsule Take 100 mg by mouth 2 (Two) Times a Day.   Yes Calvin Chester MD   escitalopram (LEXAPRO) 20 MG tablet Take 20 mg by mouth Daily.   Yes Lorna Roberson MD   ferrous sulfate 325 (65 FE) MG tablet Take 1 tablet by mouth Daily With Breakfast. 8/24/22  Yes Matt Rincon APRN   hydroCHLOROthiazide (MICROZIDE) 12.5 MG capsule Take 12.5 mg by mouth Daily.   Yes Calvin Chester MD   HYDROcodone-acetaminophen (NORCO) 5-325 MG per tablet Take 1 tablet by mouth Every 4 (Four) Hours As Needed.   Yes  Calvin Chester MD   levothyroxine (SYNTHROID, LEVOTHROID) 137 MCG tablet Take 1 tablet by mouth Daily.   Yes Maki Bal APRN   loperamide (IMODIUM) 2 MG capsule Take 2 mg by mouth 4 (Four) Times a Day As Needed for Diarrhea.   Yes Calvin Chester MD   melatonin 5 MG tablet tablet Take 5 mg by mouth At Night As Needed.   Yes Calvin Chester MD   ondansetron (ZOFRAN) 4 MG tablet Take 4 mg by mouth Every 4 (Four) Hours As Needed for Nausea or Vomiting.   Yes Calvin Chester MD   paliperidone (INVEGA) 6 MG 24 hr tablet Take 12 mg by mouth Daily.   Yes Lorna Roberson MD   pseudoephedrine (SUDAFED) 120 MG 12 hr tablet Take 120 mg by mouth Every 12 (Twelve) Hours As Needed for Congestion.   Yes Calvin Chester MD   tamsulosin (FLOMAX) 0.4 MG capsule 24 hr capsule Take 1 capsule by mouth Every Night.   Yes Lindsay Juarez MD   Zinc Oxide 25 % paste Apply 1 application topically to the appropriate area as directed 2 (Two) Times a Day.   Yes Calvin Chester MD   ·   · No Known Allergies  Most Recent Immunizations   Administered Date(s) Administered   • COVID-19 (PFIZER) PURPLE CAP 03/16/2021   • Flu Vaccine Split Quad 10/22/2020   • Hepatitis A 01/07/2019   • Influenza Quad Vaccine (Inpatient) 10/10/2014   • Influenza, Unspecified 08/10/2022   • PPD Test 08/31/2004   • Pneumococcal Polysaccharide (PPSV23) 04/21/2015   • Shingrix 01/30/2020   ·   Social History     Tobacco Use   • Smoking status: Never   • Smokeless tobacco: Not on file   Substance Use Topics   • Alcohol use: Never   ·    Social History     Substance and Sexual Activity   Drug Use Never   ·     VITALS: /56 (BP Location: Left arm, Patient Position: Lying)   Pulse 73   Temp 98 °F (36.7 °C) (Oral)   Resp 16   Wt 66.7 kg (147 lb)   SpO2 95%   BMI 22.36 kg/m²  Body mass index is 22.36 kg/m².    PHYSICAL EXAM:   · CONSTITUTIONAL: No acute distress  · LUNGS: Equal chest rise, no shortness of  air  · CARDIOVASCULAR: palpable peripheral pulses  · SKIN: no skin lesions in the area examined  · LYMPH: no lymphadenopathy in the area examined  · EXTREMITY: Right Lower Extremity  · Tenderness to Palpation: No tenderness to palpation  · Gross Deformity: There is about a 2 cm breakdown of his wound anteriorly at the mid aspect of the knee that is full-thickness.  There is some purulent drainage noted.  · Pulses:  Brisk Capillary Refill  · Sensation: Intact to Saphenous, Sural, Deep Peroneal, Superficial Peroneal, and Tibial Nerves and grossly throughout extremity  · Motor: 5/5 EHL/FHL/TA/GS motor complexes    RADIOLOGY REVIEW:   No radiology results for the last 7 days    LABS:   Results for the past 24 hours:   Recent Results (from the past 24 hour(s))   Comprehensive Metabolic Panel    Collection Time: 11/01/22  4:53 PM    Specimen: Blood   Result Value Ref Range    Glucose 111 (H) 65 - 99 mg/dL    BUN 14 6 - 20 mg/dL    Creatinine 0.77 0.76 - 1.27 mg/dL    Sodium 140 136 - 145 mmol/L    Potassium 3.5 3.5 - 5.2 mmol/L    Chloride 100 98 - 107 mmol/L    CO2 33.0 (H) 22.0 - 29.0 mmol/L    Calcium 9.4 8.6 - 10.5 mg/dL    Total Protein 7.7 6.0 - 8.5 g/dL    Albumin 3.40 (L) 3.50 - 5.20 g/dL    ALT (SGPT) 10 1 - 41 U/L    AST (SGOT) 12 1 - 40 U/L    Alkaline Phosphatase 87 39 - 117 U/L    Total Bilirubin <0.2 0.0 - 1.2 mg/dL    Globulin 4.3 gm/dL    A/G Ratio 0.8 g/dL    BUN/Creatinine Ratio 18.2 7.0 - 25.0    Anion Gap 7.0 5.0 - 15.0 mmol/L    eGFR 104.4 >60.0 mL/min/1.73   Lactic Acid, Plasma    Collection Time: 11/01/22  4:53 PM    Specimen: Blood   Result Value Ref Range    Lactate 1.1 0.5 - 2.0 mmol/L   C-reactive Protein    Collection Time: 11/01/22  4:53 PM    Specimen: Blood   Result Value Ref Range    C-Reactive Protein 1.53 (H) 0.00 - 0.50 mg/dL   Sedimentation Rate    Collection Time: 11/01/22  4:53 PM    Specimen: Blood   Result Value Ref Range    Sed Rate 40 (H) 0 - 20 mm/hr   CBC Auto Differential     "Collection Time: 11/01/22  4:53 PM    Specimen: Blood   Result Value Ref Range    WBC 9.05 3.40 - 10.80 10*3/mm3    RBC 3.78 (L) 4.14 - 5.80 10*6/mm3    Hemoglobin 11.0 (L) 13.0 - 17.7 g/dL    Hematocrit 34.4 (L) 37.5 - 51.0 %    MCV 91.0 79.0 - 97.0 fL    MCH 29.1 26.6 - 33.0 pg    MCHC 32.0 31.5 - 35.7 g/dL    RDW 13.3 12.3 - 15.4 %    RDW-SD 44.6 37.0 - 54.0 fl    MPV 9.0 6.0 - 12.0 fL    Platelets 471 (H) 140 - 450 10*3/mm3    Neutrophil % 68.1 42.7 - 76.0 %    Lymphocyte % 21.0 19.6 - 45.3 %    Monocyte % 6.4 5.0 - 12.0 %    Eosinophil % 3.6 0.3 - 6.2 %    Basophil % 0.6 0.0 - 1.5 %    Immature Grans % 0.3 0.0 - 0.5 %    Neutrophils, Absolute 6.16 1.70 - 7.00 10*3/mm3    Lymphocytes, Absolute 1.90 0.70 - 3.10 10*3/mm3    Monocytes, Absolute 0.58 0.10 - 0.90 10*3/mm3    Eosinophils, Absolute 0.33 0.00 - 0.40 10*3/mm3    Basophils, Absolute 0.05 0.00 - 0.20 10*3/mm3    Immature Grans, Absolute 0.03 0.00 - 0.05 10*3/mm3    nRBC 0.0 0.0 - 0.2 /100 WBC   Wound Culture - Wound, Knee, Left    Collection Time: 11/01/22  4:56 PM    Specimen: Knee, Left; Wound   Result Value Ref Range    Wound Culture Culture in progress     Gram Stain Many (4+) WBCs per low power field     Gram Stain Few (2+) Gram positive cocci        IMPRESSION:  Patient is a 57 y.o. Not  or  male with right knee infection    PLAN:   · Admited to: Michela Peralta MD  · Disposition: I think it is very possible this patient will require irrigation debridement although I would like to see how he does with a few days of IV antibiotics.  If he is not significantly better by this weekend, I may consider performing an irrigation and debridement procedure.    R \"Huang\" Carolina WHEATLEY MD  Orthopaedic Surgery  [facility]  [phone] - [place] Office  [phone] - [place] Office            "

## 2022-11-02 NOTE — PLAN OF CARE
Goal Outcome Evaluation:  Plan of Care Reviewed With: patient              Problem: Adult Inpatient Plan of Care  Goal: Absence of Hospital-Acquired Illness or Injury  Intervention: Identify and Manage Fall Risk  Recent Flowsheet Documentation  Taken 11/2/2022 0600 by Dorene Roland RN  Safety Promotion/Fall Prevention: safety round/check completed  Taken 11/2/2022 0400 by Dorene Roland RN  Safety Promotion/Fall Prevention: safety round/check completed  Taken 11/2/2022 0200 by Dorene Roland RN  Safety Promotion/Fall Prevention: safety round/check completed     Problem: Fall Injury Risk  Goal: Absence of Fall and Fall-Related Injury  Outcome: Ongoing, Progressing

## 2022-11-02 NOTE — H&P
HISTORY AND PHYSICAL   Psychiatric        Date of Admission: 2022  Patient Identification:  Name: Lucien Morrison  Age: 57 y.o.  Sex: male  :  1965  MRN: [id number]                     Primary Care Physician: Ran Bragg MD    Chief Complaint:  57 year old gentleman was sent in with redness and swelling of his left knee; he has also had purulent drainage from it; he recently had hardware removed y dr parish; he is not able to give any history due to intellectual disability at baseline    History of Present Illness:   As above    Past Medical History:  Past Medical History:   Diagnosis Date   • Anemia    • Anxiety    • Cataract     bilateral   • Closed displaced transverse fracture of left patella 2022   • Colitis    • Depression    • Disease of thyroid gland    • Diverticulitis    • Hard to intubate    • Hemorrhoids    • History of BPH    • Hydrocele in adult    • Hyperopia    • Hypertension    • Impulse control disorder    • Mental deficiency    • Seasonal allergies    • Shingles      Past Surgical History:  Past Surgical History:   Procedure Laterality Date   • BACK SURGERY      nodule removed   • HARDWARE REMOVAL Left 10/3/2022    Procedure: REMOVAL LEFT LEG EXTERNAL  FIXATOR;  Surgeon: Juan R Parish II, MD;  Location: [facility];  Service: Orthopedics;  Laterality: Left;   • ORCHIECTOMY Left    • ORIF HIP FRACTURE Right    • PATELLA OPEN REDUCTION INTERNAL FIXATION Left 8/15/2022    Procedure: PATELLA OPEN REDUCTION INTERNAL FIXATION REVISION AND TENDON REPAIR with EXTERNAL FIXATOR APPLICATOR;  Surgeon: Juan R Parish II, MD;  Location: [facility];  Service: Orthopedics;  Laterality: Left;      Home Meds:  (Not in a hospital admission)      Allergies:  No Known Allergies  Immunizations:  Immunization History   Administered Date(s) Administered   • COVID-19 (PFIZER) PURPLE CAP 2021, 2021   • Flu Vaccine Split Quad 10/21/2015, 10/31/2016,  10/04/2017, 2018, 10/22/2020   • Hepatitis A 2018, 2019   • Influenza Quad Vaccine (Inpatient) 10/10/2014   • Influenza, Unspecified 2015, 2019, 10/19/2019, 2021, 2021, 11/15/2021, 2022, 08/10/2022   • PPD Test 2004   • Pneumococcal Polysaccharide (PPSV23) 2015   • Shingrix 2019, 2020, 2020     Social History:   Social History     Social History Narrative   • Not on file     Social History     Socioeconomic History   • Marital status: Single   Tobacco Use   • Smoking status: Never   Vaping Use   • Vaping Use: Never used   Substance and Sexual Activity   • Alcohol use: Never   • Drug use: Never   • Sexual activity: Defer       Family History:  Family History   Family history unknown: Yes        Review of Systems  Not obtainable from the patient  Objective:  T Max 24 hrs: Temp (24hrs), Av.4 °F (37.4 °C), Min:98.8 °F (37.1 °C), Max:99.9 °F (37.7 °C)    Vitals Ranges:   Temp:  [98.8 °F (37.1 °C)-99.9 °F (37.7 °C)] 99.9 °F (37.7 °C)  Heart Rate:  [] 102  Resp:  [18] 18  BP: (128-153)/(71-92) 152/88      Exam:  /88   Pulse 102   Temp 99.9 °F (37.7 °C) (Tympanic)   Resp 18   SpO2 90%     General Appearance:    Alert, cooperative, no distress, appears stated age   Head:    Normocephalic, without obvious abnormality, atraumatic   Eyes:    PERRL, conjunctivae/corneas clear, EOM's intact, both eyes   Ears:    Normal external ear canals, both ears   Nose:   Nares normal, septum midline, mucosa normal, no drainage    or sinus tenderness   Throat:   Lips, mucosa, and tongue normal   Neck:   Supple, symmetrical, trachea midline, no adenopathy;     thyroid:  no enlargement/tenderness/nodules; no carotid    bruit or JVD   Back:     Symmetric, no curvature, ROM normal, no CVA tenderness   Lungs:     Decreased breath sounds bilaterally, respirations unlabored   Chest Wall:    No tenderness or deformity    Heart:    Regular rate and  rhythm, S1 and S2 normal, no murmur, rub   or gallop   Abdomen:     Soft, nontender, bowel sounds active all four quadrants,     no masses, no hepatomegaly, no splenomegaly   Extremities:   Extremities normal, atraumatic, left knee with erythema, drainage, swelling   Pulses:   2+ and symmetric all extremities   Skin:   Skin color, texture, turgor normal, no rashes or lesions               .    Data Review:  Labs in chart were reviewed.  WBC   Date Value Ref Range Status   11/01/2022 9.05 3.40 - 10.80 10*3/mm3 Final     Hemoglobin   Date Value Ref Range Status   11/01/2022 11.0 (L) 13.0 - 17.7 g/dL Final     Hematocrit   Date Value Ref Range Status   11/01/2022 34.4 (L) 37.5 - 51.0 % Final     Platelets   Date Value Ref Range Status   11/01/2022 471 (H) 140 - 450 10*3/mm3 Final     Sodium   Date Value Ref Range Status   11/01/2022 140 136 - 145 mmol/L Final     Potassium   Date Value Ref Range Status   11/01/2022 3.5 3.5 - 5.2 mmol/L Final     Chloride   Date Value Ref Range Status   11/01/2022 100 98 - 107 mmol/L Final     CO2   Date Value Ref Range Status   11/01/2022 33.0 (H) 22.0 - 29.0 mmol/L Final     BUN   Date Value Ref Range Status   11/01/2022 14 6 - 20 mg/dL Final     Creatinine   Date Value Ref Range Status   11/01/2022 0.77 0.76 - 1.27 mg/dL Final     Glucose   Date Value Ref Range Status   11/01/2022 111 (H) 65 - 99 mg/dL Final     Calcium   Date Value Ref Range Status   11/01/2022 9.4 8.6 - 10.5 mg/dL Final                Imaging Results (All)     None            Assessment:  Active Hospital Problems    Diagnosis  POA   • **Post-operative state [Z98.890]  Not Applicable      Resolved Hospital Problems   No resolved problems to display.   left knee septic arthritis  Anemia  Intellectual disability  Hyperglycemia  hypertension    Plan:  Will continue antibiotics  Ortho and id to see  Trend labs  Monitor on telemetry  Dw. Patient and ED provider    Michela Peralta MD  11/1/2022  20:18 EDT

## 2022-11-02 NOTE — NURSING NOTE
Pt and son understanding discharge information,papers to find a family md., IV remove TIP intact. Medications reviewed. Assit  Pt to auto via w/c and home O2.

## 2022-11-02 NOTE — CONSULTS
Referring Provider: Michela Peralta MD  [address]  [address]  [place] [address]    Reason for Consultation: septic arthritis    History of present illness:  Lucien Morrison is a 57 y.o. who I am asked to evaluate and give opinion for septic arthritis. History is obtained from the patient and review of the old medical records which I summarize/synthesize as follows: He has a history of left patella fracture requiring surgical repair and developed a wound complication. He required surgical revision and had an external fixator placed for a period of time. The external fixator was removed about a month ago per my review of the orthopedic note. A few weeks ago he began to have some drainage from the knee that improved with oral antibiotic (unsure which one). However, recently at his facility he began to have more drainage from the knee. No associated fever. No alleviating factors. A wound culture was done and is growing MSSA. He has been started on cefazolin. Surgical plans are pending.     Past Medical History:   Diagnosis Date   • Anemia    • Anxiety    • Cataract     bilateral   • Closed displaced transverse fracture of left patella 08/12/2022   • Colitis    • Depression    • Disease of thyroid gland    • Diverticulitis    • Hard to intubate    • Hemorrhoids    • History of BPH    • Hydrocele in adult    • Hyperopia    • Hypertension    • Impulse control disorder    • Mental deficiency    • Seasonal allergies    • Shingles        Past Surgical History:   Procedure Laterality Date   • BACK SURGERY      nodule removed   • HARDWARE REMOVAL Left 10/3/2022    Procedure: REMOVAL LEFT LEG EXTERNAL  FIXATOR;  Surgeon: Juan R Figueroa II, MD;  [place] [facility]  Service: Orthopedics;  Laterality: Left;   • ORCHIECTOMY Left    • ORIF HIP FRACTURE Right    • PATELLA OPEN REDUCTION INTERNAL FIXATION Left 8/15/2022    Procedure: PATELLA OPEN REDUCTION INTERNAL FIXATION REVISION AND TENDON REPAIR  with EXTERNAL FIXATOR APPLICATOR;  Surgeon: Juan R Figueroa II, MD;  Location: [facility];  Service: Orthopedics;  Laterality: Left;       Social History:  Non-smoker  No illicits    Family History:  No 1st degree relatives w/ serious MSSA infections    Antibiotic allergies and intolerances:  None    Medications:    Current Facility-Administered Medications:   •  acetaminophen (TYLENOL) tablet 650 mg, 650 mg, Oral, Q4H PRN, Michela Peralta MD  •  amLODIPine (NORVASC) tablet 5 mg, 5 mg, Oral, Daily, Michela Peralta MD  •  aspirin EC tablet 325 mg, 325 mg, Oral, Daily, Michela Peralta MD  •  bisacodyl (DULCOLAX) EC tablet 5 mg, 5 mg, Oral, Daily PRN, Michela Peralta MD  •  ceFAZolin in dextrose (ANCEF) IVPB solution 2 g, 2 g, Intravenous, Q8H, Michela Peralta MD, Stopped at 11/02/22 0611  •  clonazePAM (KlonoPIN) tablet 1 mg, 1 mg, Oral, BID, Michela Peralta MD  •  docusate sodium (COLACE) capsule 100 mg, 100 mg, Oral, BID, Michela Peralta MD  •  escitalopram (LEXAPRO) tablet 20 mg, 20 mg, Oral, Daily, Michela Peralta MD  •  ferrous sulfate tablet 325 mg, 325 mg, Oral, Daily With Breakfast, Michela Peralta MD  •  hydroCHLOROthiazide (HYDRODIURIL) tablet 12.5 mg, 12.5 mg, Oral, Daily, Michela Peralta MD  •  HYDROcodone-acetaminophen (NORCO) 5-325 MG per tablet 1 tablet, 1 tablet, Oral, Q4H PRN, Michela Peralta MD  •  levothyroxine (SYNTHROID, LEVOTHROID) tablet 137 mcg, 137 mcg, Oral, Q AM, Michela Peralta MD, 137 mcg at 11/02/22 0621  •  melatonin tablet 3 mg, 3 mg, Oral, Nightly PRN, Michela Peralta MD  •  nitroglycerin (NITROSTAT) SL tablet 0.4 mg, 0.4 mg, Sublingual, Q5 Min PRN, Michela Peralta MD  •  ondansetron (ZOFRAN) tablet 4 mg, 4 mg, Oral, Q6H PRN **OR** ondansetron (ZOFRAN) injection 4 mg, 4 mg, Intravenous, Q6H PRN, Michela ePralta MD  •  paliperidone (INVEGA) 24 hr tablet 12 mg, 12 mg,  Oral, Daily, Michela Peralta MD  •  [COMPLETED] Insert peripheral IV, , , Once **AND** sodium chloride 0.9 % flush 10 mL, 10 mL, Intravenous, PRN, Alexander Martell MD  •  tamsulosin (FLOMAX) 24 hr capsule 0.4 mg, 0.4 mg, Oral, Nightly, Michela Peralta MD    Review of Systems  All systems were reviewed and are negative unless otherwise stated above in the HPI    Objective   Vital Signs   Temp:  [98 °F (36.7 °C)-99.9 °F (37.7 °C)] 98.3 °F (36.8 °C)  Heart Rate:  [] 84  Resp:  [16-18] 18  BP: (103-157)/(56-94) 118/78    Physical Exam:   General: awake, alert, tries to answer all questions but unable to answer many  Head: atraumatic  Eyes: no scleral icterus  ENT: MMM  Neck: Supple  Cardiovascular: NR, RR, no murmur  Respiratory: Lungs are clear to auscultation bilaterally, no rales or wheezing; normal work of breathing on RA  GI: Abdomen is soft, not distended  :  no Hester catheter  Musculoskeletal: L knee bandaged; swelling and erythema are present  Skin: No rashes  Psychiatric: calm and pleasant  Vasc: no cyanosis    Labs:     Lab Results   Component Value Date    WBC 6.23 11/02/2022    HGB 9.7 (L) 11/02/2022    HCT 30.0 (L) 11/02/2022    MCV 90.4 11/02/2022     11/02/2022       Lab Results   Component Value Date    GLUCOSE 111 (H) 11/01/2022    BUN 14 11/01/2022    CREATININE 0.77 11/01/2022    BCR 18.2 11/01/2022    CO2 33.0 (H) 11/01/2022    CALCIUM 9.4 11/01/2022    ALBUMIN 3.40 (L) 11/01/2022    AST 12 11/01/2022    ALT 10 11/01/2022     Lab Results   Component Value Date    CRP 1.53 (H) 11/01/2022     Lab Results   Component Value Date    SEDRATE 40 (H) 11/01/2022         Microbiology:  10/28 Left Knee Wound Cx: moderate growth MSSA (susc all abx tested)  11/1 BCx: pending  11/1 Left Knee Wound Cx: pending    Radiology (personally reviewed report):  none    ASSESSMENT/PLAN:  1. MSSA infection  2. Left knee infection (prior hardware removed)  3. Intellectual disability  4.  Hyperglycemia    For MSSA infection of the left knee, continue cefazolin 2 g IV q8h. I'll follow-up the pending surgical plans. Repeat CBC and BMP in the AM. I'll check an A1c too. Duration of antibiotics TBD.     ID will follow.

## 2022-11-02 NOTE — PLAN OF CARE
Goal Outcome Evaluation:  Plan of Care Reviewed With: patient           Outcome Evaluation: Patient seen for bedside swallow evaluation this date. No family present. RN reported no concerns with patient's current diet.  Patient refused trial of ice chips and sips of thin liquid via straw. Tolerated sips of thin liquid via cup with no overt s/sx of aspiration or penetration. Clear vocal quality following each trial. Functional anterior-posterior transit with puree. Mastication was slow, but functional for mechanical soft and regular solids. Utilized thin liquid wash to aid in bolus formation. At the end of the evaluation, patient demonstrated immediate cough with thin liquids as SLP left the room. Patient was reclined back in bed as he took a sip. SLP repositioned patient; demonstrated no overt s/sx of aspiration or penetration with further trials of nectar or thin liquid via cup.         Recommend continuation of regular with thin liquids. Medication whole with water or puree (as tolerated). Precautions: upright at 90 degrees, small bites, slow rate. Patient's swallow appears WFL. SLP to sign off. Please re-consult if patient experiences negative pulmonary changes.    Patient was not wearing a face mask during this therapy encounter. Therapist used appropriate personal protective equipment including mask, eye protection and gloves.  Mask used was standard procedure mask. Appropriate PPE was worn during the entire therapy session. Hand hygiene was completed before and after therapy session. Patient is not in enhanced droplet precautions.

## 2022-11-02 NOTE — PROGRESS NOTES
Name: Lucien Morrison ADMIT: 2022   : 1965  PCP: Ran Bragg MD    MRN: [id number] LOS: 0 days   AGE/SEX: 57 y.o. male  ROOM: [facility]     Subjective   Subjective   alert. due to intellectual disability not able to provide any history    Review of Systems   Unable to perform ROS: Other (intellectual disability)      Objective   Objective   Vital Signs  Temp:  [97.4 °F (36.3 °C)-98.9 °F (37.2 °C)] 97.4 °F (36.3 °C)  Heart Rate:  [] 91  Resp:  [16-18] 18  BP: (103-157)/(56-94) 124/73  SpO2:  [90 %-100 %] 100 %  on   ;   Device (Oxygen Therapy): room air  Body mass index is 22.36 kg/m².    Physical Exam  Constitutional:       General: He is not in acute distress.     Appearance: He is ill-appearing (chronic). He is not toxic-appearing.   Cardiovascular:      Rate and Rhythm: Normal rate and regular rhythm.   Pulmonary:      Effort: Pulmonary effort is normal.      Breath sounds: Normal breath sounds. No wheezing, rhonchi or rales.   Abdominal:      General: Bowel sounds are normal.      Palpations: Abdomen is soft.      Tenderness: There is no abdominal tenderness. There is no guarding.      Hernia: No hernia is present.   Musculoskeletal:      Comments: left knee dressed   Skin:     General: Skin is warm and dry.   Neurological:      Mental Status: He is alert. Mental status is at baseline.     Results Review  I reviewed the patient's new clinical results.  Results from last 7 days   Lab Units 22  0826 22  1653   WBC 10*3/mm3 6.23 9.05   HEMOGLOBIN g/dL 9.7* 11.0*   PLATELETS 10*3/mm3 420 471*     Results from last 7 days   Lab Units 22  0826 22  1653   SODIUM mmol/L 140 140   POTASSIUM mmol/L 3.5 3.5   CHLORIDE mmol/L 102 100   CO2 mmol/L 30.3* 33.0*   BUN mg/dL 12 14   CREATININE mg/dL 0.62* 0.77   GLUCOSE mg/dL 90 111*     Lab Results   Component Value Date    ANIONGAP 7.7 2022     Estimated Creatinine Clearance: 124 mL/min (A) (by C-G formula based on SCr of  0.62 mg/dL (L)).    Results from last 7 days   Lab Units 11/01/22  1653   ALBUMIN g/dL 3.40*   BILIRUBIN mg/dL <0.2   ALK PHOS U/L 87   AST (SGOT) U/L 12   ALT (SGPT) U/L 10     Results from last 7 days   Lab Units 11/02/22  0826 11/01/22  1653   CALCIUM mg/dL 9.2 9.4   ALBUMIN g/dL  --  3.40*     Results from last 7 days   Lab Units 11/01/22  1653   LACTATE mmol/L 1.1     Hemoglobin A1C   Date/Time Value Ref Range Status   11/02/2022 0826 6.00 (H) 4.80 - 5.60 % Final       No radiology results for the last day    Scheduled Meds  amLODIPine, 5 mg, Oral, Daily  aspirin EC, 325 mg, Oral, Daily  ceFAZolin, 2 g, Intravenous, Q8H  clonazePAM, 1 mg, Oral, BID  docusate sodium, 100 mg, Oral, BID  escitalopram, 20 mg, Oral, Daily  ferrous sulfate, 325 mg, Oral, Daily With Breakfast  hydroCHLOROthiazide, 12.5 mg, Oral, Daily  levothyroxine, 137 mcg, Oral, Q AM  paliperidone, 12 mg, Oral, Daily  tamsulosin, 0.4 mg, Oral, Nightly    Continuous Infusions   PRN Meds  •  acetaminophen  •  bisacodyl  •  HYDROcodone-acetaminophen  •  melatonin  •  nitroglycerin  •  ondansetron **OR** ondansetron  •  [COMPLETED] Insert peripheral IV **AND** sodium chloride     Diet  Diet Regular; Cardiac    I have personally reviewed:  [x]  Laboratory   [x]  Microbiology   []  Radiology   [x]  EKG/Telemetry  SR on tele  []  Cardiology/Vascular   []  Pathology   [x]  Records     Assessment/Plan     Active Hospital Problems    Diagnosis  POA   • **MSSA (methicillin susceptible Staphylococcus aureus) infection [A49.01]  Unknown   • Infection of left knee (HCC) [M00.9]  Unknown   • Post-operative state [Z98.890]  Not Applicable   • Hypertension [I10]  Yes   • Disease of thyroid gland [E07.9]  Yes   • Mental deficiency [F79]  Yes      Resolved Hospital Problems   No resolved problems to display.     57 y.o. male with history of left patella fracture s/p repair and revision. External fixator removed a month ago. Presented from facility with drainage from  knee.    MSSA left knee infection  -IV antibiotics for a few days  -ortho suspects may need irrigation and debridement    intellectual disability, impulse control disorder    hypothyroidism  -levothyroxine     anemia  -iron and percent sat low  -hold on IV iron d/t risk of infection  -drop from yesterday but previous hgbs 8-10 range in August    HTN  -controlled    hyperglycemia  -a1c 6.00    SCDs for DVT prophylaxis    Discussed with nursing staff, CCP and care team on multidisciplinary rounds    Discharge: MALLORY Arnold MD  [facility]  11/02/22

## 2022-11-02 NOTE — PROGRESS NOTES
Nutrition Services    Patient Name:  Lucien Morrison  YOB: 1965  MRN: [id number]  Admit Date:  11/1/2022    57 y.o. male admitted for redness and swelling of knee. Nutrition following for report of reduced po intake. Pt able to provide minimal information due to intellectual disability and slurred speech. Pt has had a complicated recovery following a patella fracture, having drainage from the knee and MSSA infection. Pt has documented wt loss over the past 5 months.  Encouraged adequate po intake and ordered supplement. Will continue to follow.     CLINICAL NUTRITION ASSESSMENT      Reason for Assessment MST score 2+     Diagnosis/Problem   CC: Septic arthritis   Dx: Hyperglycemia, left knee infection, MSSA infection    Medical/Surgical History Past Medical History:   Diagnosis Date   • Anemia    • Anxiety    • Cataract     bilateral   • Closed displaced transverse fracture of left patella 08/12/2022   • Colitis    • Depression    • Disease of thyroid gland    • Diverticulitis    • Hard to intubate    • Hemorrhoids    • History of BPH    • Hydrocele in adult    • Hyperopia    • Hypertension    • Impulse control disorder    • Mental deficiency    • Seasonal allergies    • Shingles        Past Surgical History:   Procedure Laterality Date   • BACK SURGERY      nodule removed   • HARDWARE REMOVAL Left 10/3/2022    Procedure: REMOVAL LEFT LEG EXTERNAL  FIXATOR;  Surgeon: Juan R Figueroa II, MD;  Location: [facility];  Service: Orthopedics;  Laterality: Left;   • ORCHIECTOMY Left    • ORIF HIP FRACTURE Right    • PATELLA OPEN REDUCTION INTERNAL FIXATION Left 8/15/2022    Procedure: PATELLA OPEN REDUCTION INTERNAL FIXATION REVISION AND TENDON REPAIR with EXTERNAL FIXATOR APPLICATOR;  Surgeon: Juan R Figueroa II, MD;  Location: [facility];  Service: Orthopedics;  Laterality: Left;        Encounter Information        Nutrition/Diet History:     Food Preferences:    Supplements:     Factors Affecting Intake: early satiety , impaired cognition     Anthropometrics        Current Height  Current Weight  BMI kg/m2    Weight: 66.7 kg (147 lb) (11/02/22 0302)  Body mass index is 22.36 kg/m².       Admission Weight 147#   Ideal Body Weight (IBW) 154#   Usual Body Weight (UBW)    Weight Change/Trend Possible 13# (8%) wt loss in 3 months. Bedscale 157#       Weight History Wt Readings from Last 30 Encounters:   11/02/22 0302 66.7 kg (147 lb)   11/01/22 2326 66.8 kg (147 lb 4.8 oz)   08/13/22 1100 76.5 kg (168 lb 10.4 oz)   07/30/22 2325 68 kg (150 lb)   06/13/22 1433 74.8 kg (165 lb)   06/03/22 1411 77.1 kg (170 lb)   10/25/17 1217 65.5 kg (144 lb 4.9 oz)   01/18/17 1106 67.6 kg (149 lb)        Tests/Procedures        Tests/Procedures No new tests/procedures     Labs       Pertinent Labs    Results from last 7 days   Lab Units 11/02/22  0826 11/01/22  1653   SODIUM mmol/L 140 140   POTASSIUM mmol/L 3.5 3.5   CHLORIDE mmol/L 102 100   CO2 mmol/L 30.3* 33.0*   BUN mg/dL 12 14   CREATININE mg/dL 0.62* 0.77   CALCIUM mg/dL 9.2 9.4   BILIRUBIN mg/dL  --  <0.2   ALK PHOS U/L  --  87   ALT (SGPT) U/L  --  10   AST (SGOT) U/L  --  12   GLUCOSE mg/dL 90 111*     Results from last 7 days   Lab Units 11/02/22  0826 11/01/22  1653   HEMOGLOBIN g/dL 9.7* 11.0*   HEMATOCRIT % 30.0* 34.4*   WBC 10*3/mm3 6.23 9.05   ALBUMIN g/dL  --  3.40*     Results from last 7 days   Lab Units 11/02/22  0826 11/01/22  1653   PLATELETS 10*3/mm3 420 471*     COVID19   Date Value Ref Range Status   08/22/2022 Not Detected Not Detected - Ref. Range Final     Lab Results   Component Value Date    HGBA1C 6.00 (H) 11/02/2022          Medications           Scheduled Medications amLODIPine, 5 mg, Oral, Daily  aspirin EC, 325 mg, Oral, Daily  ceFAZolin, 2 g, Intravenous, Q8H  clonazePAM, 1 mg, Oral, BID  docusate sodium, 100 mg, Oral, BID  escitalopram, 20 mg, Oral, Daily  ferrous sulfate, 325 mg, Oral, Daily With  Breakfast  hydroCHLOROthiazide, 12.5 mg, Oral, Daily  levothyroxine, 137 mcg, Oral, Q AM  paliperidone, 12 mg, Oral, Daily  tamsulosin, 0.4 mg, Oral, Nightly       Infusions     PRN Medications •  acetaminophen  •  bisacodyl  •  HYDROcodone-acetaminophen  •  melatonin  •  nitroglycerin  •  ondansetron **OR** ondansetron  •  [COMPLETED] Insert peripheral IV **AND** sodium chloride     Physical Findings        Physical Appearance alert, disoriented     NFPE Potential for patient discomfort   --  Edema  no edema   Gastrointestinal hypoactive bowel sounds, last bowel movement: 11/1   Tubes/Drains none   Oral/Mouth Cavity teeth missing   Skin other: Left anterior knee puncture    --  Current Nutrition Orders & Evaluation of Intake       Oral Nutrition     Food Allergies NKFA   Current PO Diet Diet Regular; Cardiac   Supplement n/a   PO Evaluation     Trending % PO Intake 75% x 1 meal   --  Estimated/Assessed Needs       Energy Requirements    Height for Calculation      Weight for Calculation 66.7 kg (current)   Method for Estimation  25 kcal/kg, 30 kcal/kg   EST Needs (kcal/day) 8368-5828 kcal/day       Protein Requirements    Weight for Calculation 66.7 kg (current)   EST Protein Needs (g/kg) 1.2 gm/kg   EST Daily Needs (g/day) 80 gm Pro/day       Fluid Requirements     Method for Estimation 1 mL/kcal    Estimated Needs (mL/day) 3530-8026 ml/day       Fluid Deficit    Current Na Level (mEq/L)    Desired Na Level (mEq/L)    Estimated Fluid Deficit (L)           PES STATEMENT / NUTRITION DIAGNOSIS      Nutrition Dx Problem  Problem: Predicted Suboptimal Intake  Etiology: Factors Affecting Nutrition  Signs/Symptoms: Unintended Weight Change    Comment:    --  NUTRITION INTERVENTION      Intervention Goal(s) Meet estimated needs, Increase intake, Maintain weight and No significant weight loss         RD Intervention/Action Encourage intake, Follow Tx Progress and Care plan reviewed         Prescription/Orders:       PO  Diet       Supplements Glucerna BID      Enteral Nutrition       Parenteral Nutrition    New Prescription Ordered? yes   --      Monitor/Evaluation Per protocol, PO intake, Supplement intake, Pertinent labs, Weight, Skin status   Education Will instruct as appropriate   --    RD to follow per protocol.      Electronically signed by:  Vianey Vega RD  11/02/22 10:55 EDT

## 2022-11-02 NOTE — DISCHARGE PLACEMENT REQUEST
"Lucien Morrison (57 y.o. Male)     Date of Birth   1965    Social Security Number       Address   [address] [place] [facility] [place] [address]    Home Phone   [phone]    MRN   [id number]       [employer]   None    Marital Status   Single                            Admission Date   11/1/22    Admission Type   Emergency    Admitting Provider   Michela Peralta MD    Attending Provider   Elgin Arnold MD    Department, Room/Bed   [address], [address]       Discharge Date       Discharge Disposition       Discharge Destination                               Attending Provider: Elgin Arnold MD    Allergies: No Known Allergies    Isolation: None   Infection: None   Code Status: CPR    Ht: 172.7 cm (67.99\")   Wt: 66.7 kg (147 lb)    Admission Cmt: None   Principal Problem: MSSA (methicillin susceptible Staphylococcus aureus) infection [A49.01]                 Active Insurance as of 11/1/2022     Primary Coverage     Payor Plan Insurance Group Employer/Plan Group    MEDICARE MEDICARE A & B      Payor Plan Address Payor Plan Phone Number Payor Plan Fax Number Effective Dates    [address] [phone]  1/1/1993 - None Entered    [facility] [id number]       Subscriber Name Subscriber Birth Date Member ID       LUCIEN MORRISON 1965 [id number]           Secondary Coverage     Payor Plan Insurance Group Employer/Plan Group    [place]      Payor Plan Address Payor Plan Phone Number Payor Plan Fax Number Effective Dates    [address]   10/20/2020 - None Entered    [place] [address]       Subscriber Name Subscriber Birth Date Member ID       LUCIEN MORRISON 1965 [id number]                 Emergency Contacts      (Rel.) Home Phone Work Phone Mobile Phone    Christina Walsh (Mother) [phone] -- [phone]    MORRISONMINE MENDOZA (Brother) [phone] -- [phone]            {Outbreak/Travel/Exposure " Documentation......;  Question Available Choices Patient Response   COVID-19 Outbreak Screen:  Do you currently have a new onset of the following symptoms?        Fever/Chills, Cough, Shortness of air, Loss of taste or smell, No, Unknown  No (11/01/22 1623)   COVID-19 Outbreak Screen: In the last 14 days, have you had contact with anyone who is ill, has show any of the symptoms listed above and/or has been diagnosis with the 2019 Novel Coronavirus? This includes any immediate household members but excludes any patients with whom you have been in contact within your normal work duties wearing proper PPE, if you are a healthcare worker.  Yes, No, Unknown              No (11/01/22 1623)   COVID-19 Outbreak Screen: Who was notified? Free text (not recorded)   Ebola Screening Outbreak Screen: Have you traveled to the [place] or [place] within the past 21 days?  Yes, No, Unknown No (11/01/22 1623)   Ebola Screening Outbreak Screen: Do you have ANY of the following symptoms: Fever/Chills, Vomiting, Diarrhea, Fatigue, Headache, Muscle pain, Unexplained bleeding, Abdominal (stomach) pain, No, Unknown (not recorded)   Ebola Screening Outbreak Screen: Name of Person notified Free text (not recorded)   Travel Screen: Have you traveled in the last month? If so, to what country have you traveled? If [place] what state? Yes, No, Unknown  List of all countries  List of all States No (11/01/22 1701)  (not recorded)  (not recorded)   Infection Risk: Do you currently have the following symptoms?  (If cough is selected, the Tuberculosis Screen is performed.) Cough, Fever, Rash, No No (11/01/22 1701)   Tuberculosis Screen: Do you have any of the following Tuberculosis Risks?  · Have you lived or spent time with anyone who had or may have TB?  · Have you lived in or visited any of the following areas for more than one month: [place], [place], [place], [place] or [place], the [place] or [place]?  · Do you have  HIV/AIDS?  · Have you lived in or worked in a nursing home, homeless shelter, correctional facility, or substance abuse treatment facility?   · No    If Yes do you have any of the following symptoms? Yes responses display to the right    If Yes, symptoms listed are:  Cough greater than or equal to 3 weeks, Loss of appetite, Unexplained weight loss, Night sweats, Bloody sputum or hemoptysis, Hoarseness, Fever, Fatigue, Chest pain, No (not recorded)  (not recorded)   Exposure Screen: Have you been exposed to any of these contagious diseases in the last month? Measles, Chickenpox, Meningitis, Pertussis, Whooping Cough, No No (11/01/22 4112)

## 2022-11-02 NOTE — THERAPY EVALUATION
Acute Care - Speech Language Pathology   Swallow Initial Evaluation [facility]     Patient Name: Lucien Morrison  : 1965  MRN: [id number]  Today's Date: 2022               Admit Date: 2022    Visit Dx:     ICD-10-CM ICD-9-CM   1. Post-operative state  Z98.890 V45.89   2. Abscess of knee, left  L02.416 682.6     Patient Active Problem List   Diagnosis   • Closed displaced transverse fracture of left patella   • Anxiety   • Depression   • Disease of thyroid gland   • Hypertension   • History of BPH   • Mental deficiency   • Post-operative state     Past Medical History:   Diagnosis Date   • Anemia    • Anxiety    • Cataract     bilateral   • Closed displaced transverse fracture of left patella 2022   • Colitis    • Depression    • Disease of thyroid gland    • Diverticulitis    • Hard to intubate    • Hemorrhoids    • History of BPH    • Hydrocele in adult    • Hyperopia    • Hypertension    • Impulse control disorder    • Mental deficiency    • Seasonal allergies    • Shingles      Past Surgical History:   Procedure Laterality Date   • BACK SURGERY      nodule removed   • HARDWARE REMOVAL Left 10/3/2022    Procedure: REMOVAL LEFT LEG EXTERNAL  FIXATOR;  Surgeon: Juan R Figueroa II, MD;  Location: [facility];  Service: Orthopedics;  Laterality: Left;   • ORCHIECTOMY Left    • ORIF HIP FRACTURE Right    • PATELLA OPEN REDUCTION INTERNAL FIXATION Left 8/15/2022    Procedure: PATELLA OPEN REDUCTION INTERNAL FIXATION REVISION AND TENDON REPAIR with EXTERNAL FIXATOR APPLICATOR;  Surgeon: Juan R Figueroa II, MD;  Location: [facility];  Service: Orthopedics;  Laterality: Left;       SLP Recommendation and Plan  SLP Swallowing Diagnosis: swallow WFL/no suspected pharyngeal impairment (22 1345)  SLP Diet Recommendation: regular textures, thin liquids (22 1345)  Recommended Precautions and Strategies: upright posture during/after eating, small bites of food and sips  of liquid (11/02/22 1345)  SLP Rec. for Method of Medication Administration: meds whole, with thin liquids, with puree, as tolerated (11/02/22 1345)     Monitor for Signs of Aspiration: yes, notify SLP if any concerns (11/02/22 1345)  Recommended Diagnostics: No further SLP services recommended (11/02/22 1345)  Swallow Criteria for Skilled Therapeutic Interventions Met: current level of function same as previous level of function, baseline status (11/02/22 1345)  Anticipated Discharge Disposition (SLP): unknown (11/02/22 1345)  Rehab Potential/Prognosis, Swallowing: good, to achieve stated therapy goals (11/02/22 1345)  Therapy Frequency (Swallow): evaluation only (11/02/22 1345)  Predicted Duration Therapy Intervention (Days): until discharge (11/02/22 1345)                                        Plan of Care Reviewed With: patient  Outcome Evaluation: Patient seen for bedside swallow evaluation this date. No family present. RN reported no concerns with patient's current diet.  Patient refused trial of ice chips and sips of thin liquid via straw. Tolerated sips of thin liquid via cup with no overt s/sx of aspiration or penetration. Clear vocal quality following each trial. Functional anterior-posterior transit with puree. Mastication was slow, but functional for mechanical soft and regular solids. Utilized thin liquid wash to aid in bolus formation. At the end of the evaluation, patient demonstrated immediate cough with thin liquids as SLP left the room. Patient was reclined back in bed as he took a sip. SLP repositioned patient; demonstrated no overt s/sx of aspiration or penetration with trials of nectar and thin liquid via cup.     Recommend continuation of regular with thin liquids. Medication whole with water or puree (as tolerated). Precautions: upright at 90 degrees, small bites, slow rate. Patient's swallow appears WFL. SLP to sign off. Please re-consult if patient experiences negative pulmonary  changes.      SWALLOW EVALUATION (last 72 hours)     SLP Adult Swallow Evaluation     Row Name 11/02/22 1348          Document Type evaluation  -SR    Subjective Information no complaints  -SR    Patient Observations alert;cooperative;agree to therapy  -SR    Patient Effort good  -SR    Symptoms Noted During/After Treatment none  -SR          Patient Profile Reviewed yes  -SR    Pertinent History Of Current Problem 57 year old gentleman presented to [facility] with redness and swelling of his knee. Admitted with MSSA infection of knee. Hx: intellectual disability  -SR    Current Method of Nutrition regular textures;thin liquids  -SR    Precautions/Limitations, Vision WFL;for purposes of eval  -SR    Precautions/Limitations, Hearing WFL;for purposes of eval  -SR    Prior Level of Function-Communication unknown;other (see comments)  hx: intellectual disability  -SR    Prior Level of Function-Swallowing no diet consistency restrictions  -SR    Plans/Goals Discussed with patient;agreed upon  -SR    Barriers to Rehab medically complex  -SR          Additional Documentation Pain Scale: FACES Pre/Post-Treatment (Group)  -SR          Pretreatment Pain Rating 0/10 - no pain  -SR    Posttreatment Pain Rating 0/10 - no pain  -SR          Dentition Assessment natural, present and adequate  -SR    Secretion Management WNL/WFL  -SR    Mucosal Quality dry  -SR    Volitional Swallow unable to elicit  -SR    Volitional Cough WFL  -SR          Oral Motor General Assessment generalized oral motor weakness  -SR          Respiratory Support Currently in Use room air  -SR    Eating/Swallowing Skills self-fed;fed by SLP  -SR    Positioning During Eating upright 90 degree;upright in bed  -SR    Utensils Used spoon;cup  -SR    Consistencies Trialed regular textures;mechanical soft, no mixed consistencies;mixed consistency;thin liquids;nectar/syrup-thick liquids  -SR          Clinical Swallow Evaluation Summary Patient seen for bedside swallow  evaluation this date. No family present. RN reported no concerns with patient's current diet.  Patient refused trial of ice chips and sips of thin liquid via straw. Tolerated sips of thin liquid via cup with no overt s/sx of aspiration or penetration. Clear vocal quality following each trial. Functional anterior-posterior transit with puree. Mastication was slow, but functional for mechanical soft and regular solids. Utilized thin liquid wash to aid in bolus formation. At the end of the evaluation, patient demonstrated immediate cough with thin liquids as SLP left the room. Patient was reclined back in bed as he took a sip. SLP repositioned patient; demonstrated no overt s/sx of aspiration or penetration with further trials of nectar or thin liquid via cup.     Recommend continuation of regular solids with thin liquids. Medication whole with water or puree (as tolerated). Precautions: upright at 90 degrees, small bites, slow rate. Patient's swallow appears WFL. SLP to sign off. Please re-consult if patient experiences negative pulmonary changes.  -SR          SLP Swallowing Diagnosis swallow WFL/no suspected pharyngeal impairment  -SR    Functional Impact no impact on function  -SR    Rehab Potential/Prognosis, Swallowing good, to achieve stated therapy goals  -SR    Swallow Criteria for Skilled Therapeutic Interventions Met current level of function same as previous level of function;baseline status  -SR          Therapy Frequency (Swallow) evaluation only  -SR    Predicted Duration Therapy Intervention (Days) until discharge  -SR    SLP Diet Recommendation regular textures;thin liquids  -SR    Recommended Diagnostics No further SLP services recommended  -SR    Recommended Precautions and Strategies upright posture during/after eating;small bites of food and sips of liquid  -SR    Oral Care Recommendations Oral Care BID/PRN  -SR    SLP Rec. for Method of Medication Administration meds whole;with thin liquids;with  puree;as tolerated  -SR    Monitor for Signs of Aspiration yes;notify SLP if any concerns  -SR    Anticipated Discharge Disposition (SLP) unknown  -SR          User Key  (r) = Recorded By, (t) = Taken By, (c) = Cosigned By    Initials Name Effective Dates    Maddi Maurer SLP 01/12/22 -                 EDUCATION  The patient has been educated in the following areas:   Dysphagia (Swallowing Impairment).              Time Calculation:    Time Calculation- SLP     Row Name 11/02/22 1454             Time Calculation- SLP    SLP Start Time 1345  -SR      SLP Received On 11/02/22  -SR         Untimed Charges    [id number] Eval Oral Pharyng Swallow Minutes 60  -SR         Total Minutes    Untimed Charges Total Minutes 60  -SR       Total Minutes 60  -SR            User Key  (r) = Recorded By, (t) = Taken By, (c) = Cosigned By    Initials Name Provider Type    SR Maddi Graham SLP Speech and Language Pathologist                Therapy Charges for Today     Code Description Service Date Service Provider Modifiers Qty    [id number] HC ST EVAL ORAL PHARYNG SWALLOW 4 11/2/2022 Maddi Graham SLP GN 1               LEXI Olivares  11/2/2022

## 2022-11-03 LAB
ANION GAP SERPL CALCULATED.3IONS-SCNC: 5.9 MMOL/L (ref 5–15)
BACTERIA BLD CULT: ABNORMAL
BOTTLE TYPE: ABNORMAL
BUN SERPL-MCNC: 10 MG/DL (ref 6–20)
BUN/CREAT SERPL: 17.5 (ref 7–25)
CALCIUM SPEC-SCNC: 8.5 MG/DL (ref 8.6–10.5)
CHLORIDE SERPL-SCNC: 101 MMOL/L (ref 98–107)
CO2 SERPL-SCNC: 32.1 MMOL/L (ref 22–29)
CREAT SERPL-MCNC: 0.57 MG/DL (ref 0.76–1.27)
DEPRECATED RDW RBC AUTO: 44.8 FL (ref 37–54)
EGFRCR SERPLBLD CKD-EPI 2021: 114.4 ML/MIN/1.73
ERYTHROCYTE [DISTWIDTH] IN BLOOD BY AUTOMATED COUNT: 13.4 % (ref 12.3–15.4)
GLUCOSE SERPL-MCNC: 106 MG/DL (ref 65–99)
HCT VFR BLD AUTO: 28.8 % (ref 37.5–51)
HGB BLD-MCNC: 9.4 G/DL (ref 13–17.7)
MCH RBC QN AUTO: 29.7 PG (ref 26.6–33)
MCHC RBC AUTO-ENTMCNC: 32.6 G/DL (ref 31.5–35.7)
MCV RBC AUTO: 91.1 FL (ref 79–97)
PLATELET # BLD AUTO: 393 10*3/MM3 (ref 140–450)
PMV BLD AUTO: 9 FL (ref 6–12)
POTASSIUM SERPL-SCNC: 3.5 MMOL/L (ref 3.5–5.2)
RBC # BLD AUTO: 3.16 10*6/MM3 (ref 4.14–5.8)
SODIUM SERPL-SCNC: 139 MMOL/L (ref 136–145)
WBC NRBC COR # BLD: 5.73 10*3/MM3 (ref 3.4–10.8)

## 2022-11-03 PROCEDURE — 85027 COMPLETE CBC AUTOMATED: CPT | Performed by: INTERNAL MEDICINE

## 2022-11-03 PROCEDURE — 25010000002 CEFAZOLIN IN DEXTROSE 2-4 GM/100ML-% SOLUTION: Performed by: INTERNAL MEDICINE

## 2022-11-03 PROCEDURE — 80048 BASIC METABOLIC PNL TOTAL CA: CPT | Performed by: INTERNAL MEDICINE

## 2022-11-03 PROCEDURE — 99232 SBSQ HOSP IP/OBS MODERATE 35: CPT | Performed by: INTERNAL MEDICINE

## 2022-11-03 RX ADMIN — CLONAZEPAM 1 MG: 1 TABLET ORAL at 08:08

## 2022-11-03 RX ADMIN — LEVOTHYROXINE SODIUM 137 MCG: 0.14 TABLET ORAL at 06:02

## 2022-11-03 RX ADMIN — CEFAZOLIN SODIUM 2 G: 2 INJECTION, SOLUTION INTRAVENOUS at 06:02

## 2022-11-03 RX ADMIN — ESCITALOPRAM 20 MG: 20 TABLET, FILM COATED ORAL at 08:08

## 2022-11-03 RX ADMIN — DOCUSATE SODIUM 100 MG: 100 CAPSULE, LIQUID FILLED ORAL at 21:46

## 2022-11-03 RX ADMIN — HYDROCHLOROTHIAZIDE 12.5 MG: 12.5 TABLET ORAL at 08:08

## 2022-11-03 RX ADMIN — ASPIRIN 325 MG: 325 TABLET, COATED ORAL at 08:08

## 2022-11-03 RX ADMIN — DOCUSATE SODIUM 100 MG: 100 CAPSULE, LIQUID FILLED ORAL at 08:08

## 2022-11-03 RX ADMIN — PALIPERIDONE 12 MG: 6 TABLET, EXTENDED RELEASE ORAL at 08:07

## 2022-11-03 RX ADMIN — FERROUS SULFATE TAB 325 MG (65 MG ELEMENTAL FE) 325 MG: 325 (65 FE) TAB at 08:08

## 2022-11-03 RX ADMIN — CEFAZOLIN SODIUM 2 G: 2 INJECTION, SOLUTION INTRAVENOUS at 17:21

## 2022-11-03 RX ADMIN — TAMSULOSIN HYDROCHLORIDE 0.4 MG: 0.4 CAPSULE ORAL at 21:46

## 2022-11-03 RX ADMIN — CEFAZOLIN SODIUM 2 G: 2 INJECTION, SOLUTION INTRAVENOUS at 09:03

## 2022-11-03 RX ADMIN — CLONAZEPAM 1 MG: 1 TABLET ORAL at 21:46

## 2022-11-03 RX ADMIN — AMLODIPINE BESYLATE 5 MG: 5 TABLET ORAL at 08:08

## 2022-11-03 NOTE — PLAN OF CARE
Goal Outcome Evaluation:  Plan of Care Reviewed With: patient        Progress: no change  Outcome Evaluation: Patient is A & O to self. Makes needs known. Continues with IV atb therapy. Dressing clean, dry, and intact to knee. Turned and repositioned by staff. No s/s of pain or distress noted.

## 2022-11-03 NOTE — PROGRESS NOTES
Name: Lucien Morrison ADMIT: 2022   : 1965  PCP: Ran Bragg MD    MRN: [id number] LOS: 0 days   AGE/SEX: 57 y.o. male  ROOM: [facility]     Subjective   Subjective   alert. Appears to denies pain/shortness of breath. Limited history due to intellectual disability    Review of Systems   Unable to perform ROS: Other (intellectual disability)      Objective   Objective   Vital Signs  Temp:  [97.4 °F (36.3 °C)-98.4 °F (36.9 °C)] 98 °F (36.7 °C)  Heart Rate:  [79-93] 79  Resp:  [18] 18  BP: (124-137)/(71-93) 130/71  SpO2:  [91 %-100 %] 93 %  on   ;   Device (Oxygen Therapy): room air  Body mass index is 22.49 kg/m².    Physical Exam  Constitutional:       General: He is not in acute distress.     Appearance: He is ill-appearing (chronic). He is not toxic-appearing.   Cardiovascular:      Rate and Rhythm: Normal rate and regular rhythm.   Pulmonary:      Effort: Pulmonary effort is normal.      Breath sounds: Normal breath sounds. No wheezing, rhonchi or rales.   Abdominal:      General: Bowel sounds are normal.      Palpations: Abdomen is soft.      Tenderness: There is no abdominal tenderness. There is no guarding.      Hernia: No hernia is present.   Musculoskeletal:      Comments: left knee dressed   Skin:     General: Skin is warm and dry.   Neurological:      Mental Status: He is alert. Mental status is at baseline.     Results Review  I reviewed the patient's new clinical results.  Results from last 7 days   Lab Units 22  0657 22  0826 22  1653   WBC 10*3/mm3 5.73 6.23 9.05   HEMOGLOBIN g/dL 9.4* 9.7* 11.0*   PLATELETS 10*3/mm3 393 420 471*     Results from last 7 days   Lab Units 22  0657 22  0826 22  1653   SODIUM mmol/L 139 140 140   POTASSIUM mmol/L 3.5 3.5 3.5   CHLORIDE mmol/L 101 102 100   CO2 mmol/L 32.1* 30.3* 33.0*   BUN mg/dL 10 12 14   CREATININE mg/dL 0.57* 0.62* 0.77   GLUCOSE mg/dL 106* 90 111*     Lab Results   Component Value Date    ANIONGAP 5.9  11/03/2022     Estimated Creatinine Clearance: 135.7 mL/min (A) (by C-G formula based on SCr of 0.57 mg/dL (L)).    Results from last 7 days   Lab Units 11/01/22  1653   ALBUMIN g/dL 3.40*   BILIRUBIN mg/dL <0.2   ALK PHOS U/L 87   AST (SGOT) U/L 12   ALT (SGPT) U/L 10     Results from last 7 days   Lab Units 11/03/22  0657 11/02/22  0826 11/01/22  1653   CALCIUM mg/dL 8.5* 9.2 9.4   ALBUMIN g/dL  --   --  3.40*     Results from last 7 days   Lab Units 11/01/22  1653   LACTATE mmol/L 1.1     Hemoglobin A1C   Date/Time Value Ref Range Status   11/02/2022 0826 6.00 (H) 4.80 - 5.60 % Final       No radiology results for the last day    Scheduled Meds  amLODIPine, 5 mg, Oral, Daily  aspirin EC, 325 mg, Oral, Daily  ceFAZolin, 2 g, Intravenous, Q8H  clonazePAM, 1 mg, Oral, BID  docusate sodium, 100 mg, Oral, BID  escitalopram, 20 mg, Oral, Daily  ferrous sulfate, 325 mg, Oral, Daily With Breakfast  hydroCHLOROthiazide, 12.5 mg, Oral, Daily  levothyroxine, 137 mcg, Oral, Q AM  paliperidone, 12 mg, Oral, Daily  tamsulosin, 0.4 mg, Oral, Nightly    Continuous Infusions   PRN Meds  •  acetaminophen  •  bisacodyl  •  HYDROcodone-acetaminophen  •  melatonin  •  nitroglycerin  •  ondansetron **OR** ondansetron  •  [COMPLETED] Insert peripheral IV **AND** sodium chloride     Diet  Diet Regular; Cardiac    I have personally reviewed:  [x]  Laboratory   [x]  Microbiology   []  Radiology   [x]  EKG/Telemetry  SR on tele  []  Cardiology/Vascular   []  Pathology   []  Records     Assessment/Plan     Active Hospital Problems    Diagnosis  POA   • **MSSA (methicillin susceptible Staphylococcus aureus) infection [A49.01]  Unknown   • Infection of left knee (HCC) [M00.9]  Unknown   • Post-operative state [Z98.890]  Not Applicable   • Hypertension [I10]  Yes   • Disease of thyroid gland [E07.9]  Yes   • Mental deficiency [F79]  Yes      Resolved Hospital Problems   No resolved problems to display.     57 y.o. male with history of left  patella fracture s/p repair and revision. External fixator removed a month ago. Presented from facility with drainage from knee.    MSSA left knee infection  -IV antibiotics for a few days  -wound MSSA, bcid staph not aureus, bcx 1/2 GPC  -ortho suspects may need irrigation and debridement    intellectual disability, impulse control disorder    hypothyroidism  -levothyroxine     anemia  -iron and percent sat low  -PO Fe  -hgb stable    HTN  -controlled    hyperglycemia  -a1c 6.00    SCDs for DVT prophylaxis    Discussed with nursing staff, CCP and care team on multidisciplinary rounds    Discharge: MALLORY Arnold MD  [facility]  11/03/22

## 2022-11-03 NOTE — CASE MANAGEMENT/SOCIAL WORK
Continued Stay Note  [facility]     Patient Name: Lucien Morrison  MRN: [id number]  Today's Date: 11/3/2022    Admit Date: 11/1/2022    Plan: Skilled bed at [facility]   Discharge Plan     Row Name 11/03/22 1259       Plan    Plan Skilled bed at [facility]    Plan Comments Spoke to Maddi in admissions at [facility] and Shea Santana the Director of Nursing [phone] cell number [phone].  Voicemail messages were left for Kamryndanny Martell [phone] (Hospital Liaison).  Maddi and Shea both confirmed that the patient is from a skilled bed with no bedhold, can return skilled upon d/c, no COVID test is needed prior to d/c and if their transporter is available when the patient is ready for d/c they can assist with transportation.  The patient can return over the weekend if needed.  Shea stated that their transporter Gustavodanny Jauregui can be reached at [phone] to inquire as to his availability to transport the patient upon d/c.  If he is not available then a wheelchair van or EMS will need to be arranged for the patient.  CCP will follow to assist with the patient’s d/c to a skilled bed at [facility]. ARTIS RAMIREZ               Discharge Codes    No documentation.               Expected Discharge Date and Time     Expected Discharge Date Expected Discharge Time    Nov 4, 2022             ARTIS Dow

## 2022-11-03 NOTE — PROGRESS NOTES
LOS: 0 days     Chief Complaint:  Follow-up MSSA L knee infection    Interval History:  No fever. Tolerating cefazolin. Knee is about the same today. BCx negative.     ROS: no CP or vomiting    Vital Signs  Temp:  [97.4 °F (36.3 °C)-98.4 °F (36.9 °C)] 98 °F (36.7 °C)  Heart Rate:  [] 79  Resp:  [18] 18  BP: (118-137)/(71-93) 130/71    Physical Exam:  General: awake, alert, tries to answer all questions but unable to answer many  Eyes: no scleral icterus  ENT: MMM  Cardiovascular: NR  Respiratory: normal work of breathing; no wheezing  GI: Abdomen is soft  :  no Hester catheter  Musculoskeletal: L knee bandaged; swelling and erythema are present  Skin: No rashes  Psychiatric: calm and pleasant    Meds:    Current Facility-Administered Medications:   •  acetaminophen (TYLENOL) tablet 650 mg, 650 mg, Oral, Q4H PRN, Michela Peralta MD  •  amLODIPine (NORVASC) tablet 5 mg, 5 mg, Oral, Daily, Michela Peralta MD, 5 mg at 11/03/22 0808  •  aspirin EC tablet 325 mg, 325 mg, Oral, Daily, Mihcela Peralta MD, 325 mg at 11/03/22 0808  •  bisacodyl (DULCOLAX) EC tablet 5 mg, 5 mg, Oral, Daily PRN, Michela Peralta MD  •  ceFAZolin in dextrose (ANCEF) IVPB solution 2 g, 2 g, Intravenous, Q8H, Mk Fischer MD, Stopped at 11/03/22 0807  •  clonazePAM (KlonoPIN) tablet 1 mg, 1 mg, Oral, BID, Michela Peralta MD, 1 mg at 11/03/22 0808  •  docusate sodium (COLACE) capsule 100 mg, 100 mg, Oral, BID, Michela Peralta MD, 100 mg at 11/03/22 0808  •  escitalopram (LEXAPRO) tablet 20 mg, 20 mg, Oral, Daily, Michela Peralta MD, 20 mg at 11/03/22 0808  •  ferrous sulfate tablet 325 mg, 325 mg, Oral, Daily With Breakfast, Michela Peralta MD, 325 mg at 11/03/22 0808  •  hydroCHLOROthiazide (HYDRODIURIL) tablet 12.5 mg, 12.5 mg, Oral, Daily, Michela Peralta MD, 12.5 mg at 11/03/22 0808  •  HYDROcodone-acetaminophen (NORCO) 5-325 MG per tablet 1 tablet, 1 tablet,  Oral, Q4H PRN, Michela Peralta MD  •  levothyroxine (SYNTHROID, LEVOTHROID) tablet 137 mcg, 137 mcg, Oral, Q AM, Michela Peralta MD, 137 mcg at 11/03/22 0602  •  melatonin tablet 3 mg, 3 mg, Oral, Nightly PRN, Michela Peralta MD  •  nitroglycerin (NITROSTAT) SL tablet 0.4 mg, 0.4 mg, Sublingual, Q5 Min PRN, Michela Peralta MD  •  ondansetron (ZOFRAN) tablet 4 mg, 4 mg, Oral, Q6H PRN **OR** ondansetron (ZOFRAN) injection 4 mg, 4 mg, Intravenous, Q6H PRN, Michela Peralta MD  •  paliperidone (INVEGA) 24 hr tablet 12 mg, 12 mg, Oral, Daily, Michela Peralta MD, 12 mg at 11/03/22 0807  •  [COMPLETED] Insert peripheral IV, , , Once **AND** sodium chloride 0.9 % flush 10 mL, 10 mL, Intravenous, PRN, Alexander Martell MD, 10 mL at 11/02/22 1004  •  tamsulosin (FLOMAX) 24 hr capsule 0.4 mg, 0.4 mg, Oral, Nightly, Michela Peralta MD, 0.4 mg at 11/02/22 2107    LABS:  CBC, BMP, and micro reviewed today  Lab Results   Component Value Date    WBC 5.73 11/03/2022    HGB 9.4 (L) 11/03/2022    HCT 28.8 (L) 11/03/2022    MCV 91.1 11/03/2022     11/03/2022     Lab Results   Component Value Date    GLUCOSE 106 (H) 11/03/2022    CALCIUM 8.5 (L) 11/03/2022     11/03/2022    K 3.5 11/03/2022    CO2 32.1 (H) 11/03/2022     11/03/2022    BUN 10 11/03/2022    CREATININE 0.57 (L) 11/03/2022    BCR 17.5 11/03/2022    ANIONGAP 5.9 11/03/2022     Lab Results   Component Value Date    CRP 1.53 (H) 11/01/2022     Lab Results   Component Value Date    HGBA1C 6.00 (H) 11/02/2022       Microbiology:  10/28 Left Knee Wound Cx: moderate growth MSSA (susc all abx tested)  11/1 BCx: NGTD  11/1 Left Knee Wound Cx: MSSA     Radiology (personally reviewed report):  none     ASSESSMENT/PLAN:  1. MSSA infection  2. Left knee infection (prior hardware removed)  3. Intellectual disability  4. Hyperglycemia - A1c 6%     For MSSA infection of the left knee, continue cefazolin 2 g IV q8h. I'll  follow-up the pending surgical plans.  I'll check an A1c too. Repeat CBC and Crt in the AM. D/W RN. ID will follow.

## 2022-11-04 LAB
BACTERIA SPEC AEROBE CULT: ABNORMAL
BACTERIA SPEC AEROBE CULT: ABNORMAL
CREAT SERPL-MCNC: 0.52 MG/DL (ref 0.76–1.27)
CRP SERPL-MCNC: 0.64 MG/DL (ref 0–0.5)
DEPRECATED RDW RBC AUTO: 44.2 FL (ref 37–54)
EGFRCR SERPLBLD CKD-EPI 2021: 117.6 ML/MIN/1.73
ERYTHROCYTE [DISTWIDTH] IN BLOOD BY AUTOMATED COUNT: 13.2 % (ref 12.3–15.4)
GRAM STN SPEC: ABNORMAL
HCT VFR BLD AUTO: 34.6 % (ref 37.5–51)
HGB BLD-MCNC: 11.2 G/DL (ref 13–17.7)
ISOLATED FROM: ABNORMAL
MCH RBC QN AUTO: 29.5 PG (ref 26.6–33)
MCHC RBC AUTO-ENTMCNC: 32.4 G/DL (ref 31.5–35.7)
MCV RBC AUTO: 91.1 FL (ref 79–97)
PLATELET # BLD AUTO: 307 10*3/MM3 (ref 140–450)
PMV BLD AUTO: 10.1 FL (ref 6–12)
RBC # BLD AUTO: 3.8 10*6/MM3 (ref 4.14–5.8)
WBC NRBC COR # BLD: 6.66 10*3/MM3 (ref 3.4–10.8)

## 2022-11-04 PROCEDURE — 82565 ASSAY OF CREATININE: CPT | Performed by: INTERNAL MEDICINE

## 2022-11-04 PROCEDURE — 99232 SBSQ HOSP IP/OBS MODERATE 35: CPT | Performed by: INTERNAL MEDICINE

## 2022-11-04 PROCEDURE — 86140 C-REACTIVE PROTEIN: CPT | Performed by: INTERNAL MEDICINE

## 2022-11-04 PROCEDURE — 25010000002 CEFAZOLIN IN DEXTROSE 2-4 GM/100ML-% SOLUTION: Performed by: INTERNAL MEDICINE

## 2022-11-04 PROCEDURE — 85027 COMPLETE CBC AUTOMATED: CPT | Performed by: INTERNAL MEDICINE

## 2022-11-04 RX ADMIN — ESCITALOPRAM 20 MG: 20 TABLET, FILM COATED ORAL at 11:56

## 2022-11-04 RX ADMIN — AMLODIPINE BESYLATE 5 MG: 5 TABLET ORAL at 11:56

## 2022-11-04 RX ADMIN — CEFAZOLIN SODIUM 2 G: 2 INJECTION, SOLUTION INTRAVENOUS at 18:05

## 2022-11-04 RX ADMIN — CLONAZEPAM 1 MG: 1 TABLET ORAL at 20:12

## 2022-11-04 RX ADMIN — TAMSULOSIN HYDROCHLORIDE 0.4 MG: 0.4 CAPSULE ORAL at 20:12

## 2022-11-04 RX ADMIN — CLONAZEPAM 1 MG: 1 TABLET ORAL at 11:55

## 2022-11-04 RX ADMIN — DOCUSATE SODIUM 100 MG: 100 CAPSULE, LIQUID FILLED ORAL at 11:56

## 2022-11-04 RX ADMIN — CEFAZOLIN SODIUM 2 G: 2 INJECTION, SOLUTION INTRAVENOUS at 11:56

## 2022-11-04 RX ADMIN — LEVOTHYROXINE SODIUM 137 MCG: 0.14 TABLET ORAL at 06:19

## 2022-11-04 RX ADMIN — CEFAZOLIN SODIUM 2 G: 2 INJECTION, SOLUTION INTRAVENOUS at 02:12

## 2022-11-04 RX ADMIN — PALIPERIDONE 12 MG: 6 TABLET, EXTENDED RELEASE ORAL at 11:55

## 2022-11-04 RX ADMIN — HYDROCHLOROTHIAZIDE 12.5 MG: 12.5 TABLET ORAL at 11:56

## 2022-11-04 RX ADMIN — ASPIRIN 325 MG: 325 TABLET, COATED ORAL at 11:56

## 2022-11-04 RX ADMIN — FERROUS SULFATE TAB 325 MG (65 MG ELEMENTAL FE) 325 MG: 325 (65 FE) TAB at 11:56

## 2022-11-04 RX ADMIN — DOCUSATE SODIUM 100 MG: 100 CAPSULE, LIQUID FILLED ORAL at 20:12

## 2022-11-04 NOTE — PROGRESS NOTES
LOS: 1 day     Chief Complaint:  Follow-up MSSA L knee infection    Interval History:  No fever. Tolerating cefazolin w/o rash. He says the left knee is about the same today.     ROS: no CP or vomiting    Vital Signs  Temp:  [97.6 °F (36.4 °C)-98.1 °F (36.7 °C)] 97.6 °F (36.4 °C)  Heart Rate:  [65-94] 65  Resp:  [16] 16  BP: (104-152)/(62-92) 152/92    Physical Exam:  General: awake, alert, feeding himself breakfast  Eyes: no scleral icterus  ENT: MMM  Cardiovascular: NR  Respiratory: normal work of breathing on RA; no wheezing  GI: Abdomen is soft  :  no Hester catheter  Musculoskeletal: L knee bandaged; swelling and erythema are present  Skin: No rashes  Psychiatric: calm and pleasant    Meds:    Current Facility-Administered Medications:   •  acetaminophen (TYLENOL) tablet 650 mg, 650 mg, Oral, Q4H PRN, Michela Peralta MD  •  amLODIPine (NORVASC) tablet 5 mg, 5 mg, Oral, Daily, Michela Peralta MD, 5 mg at 11/03/22 0808  •  aspirin EC tablet 325 mg, 325 mg, Oral, Daily, Michela Peralta MD, 325 mg at 11/03/22 0808  •  bisacodyl (DULCOLAX) EC tablet 5 mg, 5 mg, Oral, Daily PRN, Michela Peralta MD  •  ceFAZolin in dextrose (ANCEF) IVPB solution 2 g, 2 g, Intravenous, Q8H, Mk Fischer MD, Last Rate: 0 mL/hr at 11/03/22 1104, 2 g at 11/04/22 0212  •  clonazePAM (KlonoPIN) tablet 1 mg, 1 mg, Oral, BID, Michela Peralta MD, 1 mg at 11/03/22 2146  •  docusate sodium (COLACE) capsule 100 mg, 100 mg, Oral, BID, Michela Peralta MD, 100 mg at 11/03/22 2146  •  escitalopram (LEXAPRO) tablet 20 mg, 20 mg, Oral, Daily, Michela Peralta MD, 20 mg at 11/03/22 0808  •  ferrous sulfate tablet 325 mg, 325 mg, Oral, Daily With Breakfast, Michela Peralta MD, 325 mg at 11/03/22 0808  •  hydroCHLOROthiazide (HYDRODIURIL) tablet 12.5 mg, 12.5 mg, Oral, Daily, Michela Peralta MD, 12.5 mg at 11/03/22 0808  •  HYDROcodone-acetaminophen (NORCO) 5-325 MG per tablet  1 tablet, 1 tablet, Oral, Q4H PRN, Michela Peralta MD  •  levothyroxine (SYNTHROID, LEVOTHROID) tablet 137 mcg, 137 mcg, Oral, Q AM, Michela Peralta MD, 137 mcg at 11/04/22 0619  •  melatonin tablet 3 mg, 3 mg, Oral, Nightly PRN, Michela Peralta MD  •  nitroglycerin (NITROSTAT) SL tablet 0.4 mg, 0.4 mg, Sublingual, Q5 Min PRN, Michela Peralta MD  •  ondansetron (ZOFRAN) tablet 4 mg, 4 mg, Oral, Q6H PRN **OR** ondansetron (ZOFRAN) injection 4 mg, 4 mg, Intravenous, Q6H PRN, Michela Peralta MD  •  paliperidone (INVEGA) 24 hr tablet 12 mg, 12 mg, Oral, Daily, Michela Peralta MD, 12 mg at 11/03/22 0807  •  [COMPLETED] Insert peripheral IV, , , Once **AND** sodium chloride 0.9 % flush 10 mL, 10 mL, Intravenous, PRN, Alexander Martell MD, 10 mL at 11/02/22 1004  •  tamsulosin (FLOMAX) 24 hr capsule 0.4 mg, 0.4 mg, Oral, Nightly, Michela Peralta MD, 0.4 mg at 11/03/22 3376    LABS:  CBC, BMP, and micro reviewed today  Lab Results   Component Value Date    WBC 5.73 11/03/2022    HGB 9.4 (L) 11/03/2022    HCT 28.8 (L) 11/03/2022    MCV 91.1 11/03/2022     11/03/2022     Lab Results   Component Value Date    GLUCOSE 106 (H) 11/03/2022    CALCIUM 8.5 (L) 11/03/2022     11/03/2022    K 3.5 11/03/2022    CO2 32.1 (H) 11/03/2022     11/03/2022    BUN 10 11/03/2022    CREATININE 0.57 (L) 11/03/2022    BCR 17.5 11/03/2022    ANIONGAP 5.9 11/03/2022     Lab Results   Component Value Date    CRP 1.53 (H) 11/01/2022     Lab Results   Component Value Date    HGBA1C 6.00 (H) 11/02/2022       Microbiology:  10/28 Left Knee Wound Cx: moderate growth MSSA (susc all abx tested)  11/1 BCx: coag-negative Staph in 1/2 sets = contaminant  11/1 Left Knee Wound Cx: MSSA     Radiology (personally reviewed report):  none     ASSESSMENT/PLAN:  1. MSSA infection  2. Left knee infection (prior hardware removed)  3. Intellectual disability  4. Hyperglycemia - A1c 6%  5. Contamination of  blood culture  - new     For MSSA infection of the left knee, continue cefazolin 2 g IV q8h. I'll follow-up the pending surgical plans.  Duration TBD based on surgical plans and depth of infection. The blood culture positive for coag-negative Staph in 1/2 sets is a contaminant.     ID will follow.

## 2022-11-04 NOTE — PLAN OF CARE
Problem: Skin Injury Risk Increased  Goal: Skin Health and Integrity  Outcome: Ongoing, Progressing     Problem: Fall Injury Risk  Goal: Absence of Fall and Fall-Related Injury  Outcome: Ongoing, Progressing   Goal Outcome Evaluation:           Progress: no change  Outcome Evaluation: Pt appeared to sleep well, alert and oriented x2, coop with care, calls for assist, repositioned prn, dressing to l knee dried drainage, heel boots applied, toelrating antibiotic.

## 2022-11-05 LAB
CREAT SERPL-MCNC: 0.68 MG/DL (ref 0.76–1.27)
DEPRECATED RDW RBC AUTO: 44.6 FL (ref 37–54)
EGFRCR SERPLBLD CKD-EPI 2021: 108.4 ML/MIN/1.73
ERYTHROCYTE [DISTWIDTH] IN BLOOD BY AUTOMATED COUNT: 13.6 % (ref 12.3–15.4)
HCT VFR BLD AUTO: 29.7 % (ref 37.5–51)
HGB BLD-MCNC: 9.6 G/DL (ref 13–17.7)
MCH RBC QN AUTO: 29 PG (ref 26.6–33)
MCHC RBC AUTO-ENTMCNC: 32.3 G/DL (ref 31.5–35.7)
MCV RBC AUTO: 89.7 FL (ref 79–97)
PLATELET # BLD AUTO: 416 10*3/MM3 (ref 140–450)
PMV BLD AUTO: 9.2 FL (ref 6–12)
RBC # BLD AUTO: 3.31 10*6/MM3 (ref 4.14–5.8)
WBC NRBC COR # BLD: 20.89 10*3/MM3 (ref 3.4–10.8)

## 2022-11-05 PROCEDURE — 82565 ASSAY OF CREATININE: CPT | Performed by: INTERNAL MEDICINE

## 2022-11-05 PROCEDURE — 25010000002 CEFAZOLIN IN DEXTROSE 2-4 GM/100ML-% SOLUTION: Performed by: INTERNAL MEDICINE

## 2022-11-05 PROCEDURE — 85027 COMPLETE CBC AUTOMATED: CPT | Performed by: INTERNAL MEDICINE

## 2022-11-05 RX ORDER — LOPERAMIDE HYDROCHLORIDE 2 MG/1
2 CAPSULE ORAL 4 TIMES DAILY PRN
Status: DISCONTINUED | OUTPATIENT
Start: 2022-11-05 | End: 2022-11-09 | Stop reason: HOSPADM

## 2022-11-05 RX ADMIN — Medication 10 ML: at 22:30

## 2022-11-05 RX ADMIN — ACETAMINOPHEN 650 MG: 325 TABLET, FILM COATED ORAL at 17:34

## 2022-11-05 RX ADMIN — HYDROCHLOROTHIAZIDE 12.5 MG: 12.5 TABLET ORAL at 08:27

## 2022-11-05 RX ADMIN — CLONAZEPAM 1 MG: 1 TABLET ORAL at 08:27

## 2022-11-05 RX ADMIN — CEFAZOLIN SODIUM 2 G: 2 INJECTION, SOLUTION INTRAVENOUS at 09:45

## 2022-11-05 RX ADMIN — LOPERAMIDE HYDROCHLORIDE 2 MG: 2 CAPSULE ORAL at 17:34

## 2022-11-05 RX ADMIN — CEFAZOLIN SODIUM 2 G: 2 INJECTION, SOLUTION INTRAVENOUS at 01:56

## 2022-11-05 RX ADMIN — PALIPERIDONE 12 MG: 6 TABLET, EXTENDED RELEASE ORAL at 08:27

## 2022-11-05 RX ADMIN — LEVOTHYROXINE SODIUM 137 MCG: 0.14 TABLET ORAL at 05:28

## 2022-11-05 RX ADMIN — ASPIRIN 325 MG: 325 TABLET, COATED ORAL at 08:27

## 2022-11-05 RX ADMIN — CEFAZOLIN SODIUM 2 G: 2 INJECTION, SOLUTION INTRAVENOUS at 17:26

## 2022-11-05 RX ADMIN — TAMSULOSIN HYDROCHLORIDE 0.4 MG: 0.4 CAPSULE ORAL at 22:28

## 2022-11-05 RX ADMIN — AMLODIPINE BESYLATE 5 MG: 5 TABLET ORAL at 08:27

## 2022-11-05 RX ADMIN — CLONAZEPAM 1 MG: 1 TABLET ORAL at 22:28

## 2022-11-05 RX ADMIN — FERROUS SULFATE TAB 325 MG (65 MG ELEMENTAL FE) 325 MG: 325 (65 FE) TAB at 08:27

## 2022-11-05 RX ADMIN — ESCITALOPRAM 20 MG: 20 TABLET, FILM COATED ORAL at 08:27

## 2022-11-05 NOTE — PROGRESS NOTES
Name: Lucien Morrison ADMIT: 2022   : 1965  PCP: Ran Bragg MD    MRN: [id number] LOS: 2 days   AGE/SEX: 57 y.o. male  ROOM: [facility]     Subjective   Subjective   Alert. Seems to deny pain and shortness of breath. Family at bedside. Discussed with RN no new issues     Review of Systems   Unable to perform ROS: Other (intellectual disability)      Objective   Objective   Vital Signs  Temp:  [97.6 °F (36.4 °C)-99.4 °F (37.4 °C)] 99.4 °F (37.4 °C)  Heart Rate:  [] 103  Resp:  [16] 16  BP: (127-160)/(50-92) 127/62  SpO2:  [93 %-94 %] 93 %  on  Flow (L/min):  [2] 2;   Device (Oxygen Therapy): nasal cannula  Body mass index is 22.92 kg/m².    Physical Exam  Constitutional:       General: He is not in acute distress.     Appearance: He is ill-appearing (chronic). He is not toxic-appearing.   Cardiovascular:      Rate and Rhythm: Normal rate and regular rhythm.   Pulmonary:      Effort: Pulmonary effort is normal.      Breath sounds: Normal breath sounds. No wheezing, rhonchi or rales.   Abdominal:      General: Bowel sounds are normal.      Palpations: Abdomen is soft.      Tenderness: There is no abdominal tenderness. There is no guarding.      Hernia: No hernia is present.   Musculoskeletal:      Comments: left knee dressed   Skin:     General: Skin is warm and dry.   Neurological:      Mental Status: He is alert. Mental status is at baseline.     Results Review  I reviewed the patient's new clinical results.  Results from last 7 days   Lab Units 22  0822  0836 22  0657 22  08   WBC 10*3/mm3 20.89* 6.66 5.73 6.23   HEMOGLOBIN g/dL 9.6* 11.2* 9.4* 9.7*   PLATELETS 10*3/mm3 416 307 393 420     Results from last 7 days   Lab Units 22  0826 22  0836 22  0657 22  0826 22  1653   SODIUM mmol/L  --   --  139 140 140   POTASSIUM mmol/L  --   --  3.5 3.5 3.5   CHLORIDE mmol/L  --   --  101 102 100   CO2 mmol/L  --   --  32.1* 30.3* 33.0*   BUN  mg/dL  --   --  10 12 14   CREATININE mg/dL 0.68* 0.52* 0.57* 0.62* 0.77   GLUCOSE mg/dL  --   --  106* 90 111*     Lab Results   Component Value Date    ANIONGAP 5.9 11/03/2022     Estimated Creatinine Clearance: 116 mL/min (A) (by C-G formula based on SCr of 0.68 mg/dL (L)).    Results from last 7 days   Lab Units 11/01/22  1653   ALBUMIN g/dL 3.40*   BILIRUBIN mg/dL <0.2   ALK PHOS U/L 87   AST (SGOT) U/L 12   ALT (SGPT) U/L 10     Results from last 7 days   Lab Units 11/03/22  0657 11/02/22  0826 11/01/22  1653   CALCIUM mg/dL 8.5* 9.2 9.4   ALBUMIN g/dL  --   --  3.40*     Results from last 7 days   Lab Units 11/01/22  1653   LACTATE mmol/L 1.1     No results found for: HGBA1C, POCGLU    No radiology results for the last day    Scheduled Meds  amLODIPine, 5 mg, Oral, Daily  aspirin EC, 325 mg, Oral, Daily  ceFAZolin, 2 g, Intravenous, Q8H  clonazePAM, 1 mg, Oral, BID  docusate sodium, 100 mg, Oral, BID  escitalopram, 20 mg, Oral, Daily  ferrous sulfate, 325 mg, Oral, Daily With Breakfast  hydroCHLOROthiazide, 12.5 mg, Oral, Daily  levothyroxine, 137 mcg, Oral, Q AM  paliperidone, 12 mg, Oral, Daily  tamsulosin, 0.4 mg, Oral, Nightly    Continuous Infusions   PRN Meds  •  acetaminophen  •  bisacodyl  •  HYDROcodone-acetaminophen  •  melatonin  •  nitroglycerin  •  ondansetron **OR** ondansetron  •  [COMPLETED] Insert peripheral IV **AND** sodium chloride     Diet  Diet Regular; Cardiac    I have personally reviewed:  [x]  Laboratory   []  Microbiology   []  Radiology   [x]  EKG/Telemetry  SR on telemetry  []  Cardiology/Vascular   []  Pathology   []  Records     Assessment/Plan     Active Hospital Problems    Diagnosis  POA   • **MSSA (methicillin susceptible Staphylococcus aureus) infection [A49.01]  Unknown   • Infection of left knee (HCC) [M00.9]  Unknown   • Post-operative state [Z98.890]  Not Applicable   • Hypertension [I10]  Yes   • Disease of thyroid gland [E07.9]  Yes   • Mental deficiency [F79]  Yes       Resolved Hospital Problems   No resolved problems to display.     57 y.o. male with history of left patella fracture s/p repair and revision. External fixator removed a month ago. Presented from facility with drainage from knee.    MSSA left knee infection  -IV antibiotics per infectious diseases  -wound MSSA (blood contaminant)  -ortho possible I+D    intellectual disability, impulse control disorder    hypothyroidism  -levothyroxine     anemia  -iron and percent sat low  -PO Fe  -hgb stable    HTN  -Control acceptable    hyperglycemia  -a1c 6.00    SCDs for DVT prophylaxis    Discussed with family and nursing staff    Discharge: MALLORY Arnold MD  [facility]  11/05/22

## 2022-11-05 NOTE — PLAN OF CARE
Goal Outcome Evaluation:  Plan of Care Reviewed With: patient        Progress: no change  Outcome Evaluation: Patient sleeping in between care. Makes needs known. Continues with IV atb therapy. Family at bedside earlier. No c/o pain. No s/s of distress noted.

## 2022-11-05 NOTE — PLAN OF CARE
Goal Outcome Evaluation:  Plan of Care Reviewed With: patient        Progress: no change   VSS. O2@2L. SR on monitor. Q2 hour turn. SCD in place. Accumax pump on bed. Barrier cream applied to bottom. Multiple episodes of diarrhea this shift. BM X4. Bed in low position. Bed alarm on. Call light in reach.

## 2022-11-06 LAB
BACTERIA SPEC AEROBE CULT: NORMAL
BASOPHILS # BLD AUTO: 0.05 10*3/MM3 (ref 0–0.2)
BASOPHILS NFR BLD AUTO: 0.4 % (ref 0–1.5)
DEPRECATED RDW RBC AUTO: 46 FL (ref 37–54)
EOSINOPHIL # BLD AUTO: 0.29 10*3/MM3 (ref 0–0.4)
EOSINOPHIL NFR BLD AUTO: 2.4 % (ref 0.3–6.2)
ERYTHROCYTE [DISTWIDTH] IN BLOOD BY AUTOMATED COUNT: 13.5 % (ref 12.3–15.4)
HCT VFR BLD AUTO: 31.4 % (ref 37.5–51)
HGB BLD-MCNC: 9.9 G/DL (ref 13–17.7)
IMM GRANULOCYTES # BLD AUTO: 0.06 10*3/MM3 (ref 0–0.05)
IMM GRANULOCYTES NFR BLD AUTO: 0.5 % (ref 0–0.5)
LYMPHOCYTES # BLD AUTO: 1.09 10*3/MM3 (ref 0.7–3.1)
LYMPHOCYTES NFR BLD AUTO: 8.9 % (ref 19.6–45.3)
MCH RBC QN AUTO: 28.9 PG (ref 26.6–33)
MCHC RBC AUTO-ENTMCNC: 31.5 G/DL (ref 31.5–35.7)
MCV RBC AUTO: 91.5 FL (ref 79–97)
MONOCYTES # BLD AUTO: 0.74 10*3/MM3 (ref 0.1–0.9)
MONOCYTES NFR BLD AUTO: 6.1 % (ref 5–12)
NEUTROPHILS NFR BLD AUTO: 81.7 % (ref 42.7–76)
NEUTROPHILS NFR BLD AUTO: 9.98 10*3/MM3 (ref 1.7–7)
NRBC BLD AUTO-RTO: 0 /100 WBC (ref 0–0.2)
PLATELET # BLD AUTO: 436 10*3/MM3 (ref 140–450)
PMV BLD AUTO: 9 FL (ref 6–12)
RBC # BLD AUTO: 3.43 10*6/MM3 (ref 4.14–5.8)
WBC NRBC COR # BLD: 12.21 10*3/MM3 (ref 3.4–10.8)

## 2022-11-06 PROCEDURE — 25010000002 CEFAZOLIN IN DEXTROSE 2-4 GM/100ML-% SOLUTION: Performed by: HOSPITALIST

## 2022-11-06 PROCEDURE — 85025 COMPLETE CBC W/AUTO DIFF WBC: CPT | Performed by: STUDENT IN AN ORGANIZED HEALTH CARE EDUCATION/TRAINING PROGRAM

## 2022-11-06 PROCEDURE — 25010000002 CEFAZOLIN IN DEXTROSE 2-4 GM/100ML-% SOLUTION: Performed by: INTERNAL MEDICINE

## 2022-11-06 RX ADMIN — CEFAZOLIN SODIUM 2 G: 2 INJECTION, SOLUTION INTRAVENOUS at 17:54

## 2022-11-06 RX ADMIN — TAMSULOSIN HYDROCHLORIDE 0.4 MG: 0.4 CAPSULE ORAL at 21:42

## 2022-11-06 RX ADMIN — CEFAZOLIN SODIUM 2 G: 2 INJECTION, SOLUTION INTRAVENOUS at 01:55

## 2022-11-06 RX ADMIN — AMLODIPINE BESYLATE 5 MG: 5 TABLET ORAL at 09:53

## 2022-11-06 RX ADMIN — LEVOTHYROXINE SODIUM 137 MCG: 0.14 TABLET ORAL at 06:57

## 2022-11-06 RX ADMIN — HYDROCHLOROTHIAZIDE 12.5 MG: 12.5 TABLET ORAL at 09:53

## 2022-11-06 RX ADMIN — ASPIRIN 325 MG: 325 TABLET, COATED ORAL at 09:53

## 2022-11-06 RX ADMIN — DOCUSATE SODIUM 100 MG: 100 CAPSULE, LIQUID FILLED ORAL at 09:54

## 2022-11-06 RX ADMIN — CLONAZEPAM 1 MG: 1 TABLET ORAL at 21:42

## 2022-11-06 RX ADMIN — ESCITALOPRAM 20 MG: 20 TABLET, FILM COATED ORAL at 09:53

## 2022-11-06 RX ADMIN — CLONAZEPAM 1 MG: 1 TABLET ORAL at 09:54

## 2022-11-06 RX ADMIN — CEFAZOLIN SODIUM 2 G: 2 INJECTION, SOLUTION INTRAVENOUS at 09:54

## 2022-11-06 RX ADMIN — FERROUS SULFATE TAB 325 MG (65 MG ELEMENTAL FE) 325 MG: 325 (65 FE) TAB at 09:54

## 2022-11-06 NOTE — PROGRESS NOTES
Name: Lucien Morrison ADMIT: 2022   : 1965  PCP: Ran Bragg MD    MRN: [id number] LOS: 3 days   AGE/SEX: 57 y.o. male  ROOM: [facility]     Subjective   Subjective   Alert. Seems to deny pain and shortness of breath. Discussed with nursing staff loose stools but no diarrhea.    Review of Systems   Unable to perform ROS: Other (intellectual disability)      Objective   Objective   Vital Signs  Temp:  [98.1 °F (36.7 °C)-99.2 °F (37.3 °C)] 98.4 °F (36.9 °C)  Heart Rate:  [] 101  Resp:  [16] 16  BP: (107-153)/(56-94) 153/94  SpO2:  [93 %-95 %] 94 %  on  Flow (L/min):  [1.5] 1.5;   Device (Oxygen Therapy): nasal cannula  Body mass index is 22.98 kg/m².    Physical Exam  Constitutional:       General: He is not in acute distress.     Appearance: He is ill-appearing (chronic). He is not toxic-appearing.   Cardiovascular:      Rate and Rhythm: Normal rate and regular rhythm.   Pulmonary:      Effort: Pulmonary effort is normal.      Breath sounds: Normal breath sounds. No wheezing, rhonchi or rales.   Abdominal:      General: Bowel sounds are normal.      Palpations: Abdomen is soft.      Tenderness: There is no abdominal tenderness. There is no guarding.      Hernia: No hernia is present.   Musculoskeletal:      Comments: left knee dressed   Skin:     General: Skin is warm and dry.   Neurological:      Mental Status: He is alert. Mental status is at baseline.     Results Review  I reviewed the patient's new clinical results.  Results from last 7 days   Lab Units 22  0857 22  0826 22  0836 22  0657   WBC 10*3/mm3 12.21* 20.89* 6.66 5.73   HEMOGLOBIN g/dL 9.9* 9.6* 11.2* 9.4*   PLATELETS 10*3/mm3 436 416 307 393     Results from last 7 days   Lab Units 22  0826 22  0836 22  0657 22  0826 22  1653   SODIUM mmol/L  --   --  139 140 140   POTASSIUM mmol/L  --   --  3.5 3.5 3.5   CHLORIDE mmol/L  --   --  101 102 100   CO2 mmol/L  --   --  32.1* 30.3*  33.0*   BUN mg/dL  --   --  10 12 14   CREATININE mg/dL 0.68* 0.52* 0.57* 0.62* 0.77   GLUCOSE mg/dL  --   --  106* 90 111*     Lab Results   Component Value Date    ANIONGAP 5.9 11/03/2022     Estimated Creatinine Clearance: 116.1 mL/min (A) (by C-G formula based on SCr of 0.68 mg/dL (L)).    Results from last 7 days   Lab Units 11/01/22  1653   ALBUMIN g/dL 3.40*   BILIRUBIN mg/dL <0.2   ALK PHOS U/L 87   AST (SGOT) U/L 12   ALT (SGPT) U/L 10     Results from last 7 days   Lab Units 11/03/22  0657 11/02/22  0826 11/01/22  1653   CALCIUM mg/dL 8.5* 9.2 9.4   ALBUMIN g/dL  --   --  3.40*     Results from last 7 days   Lab Units 11/01/22  1653   LACTATE mmol/L 1.1     No results found for: HGBA1C, POCGLU    No radiology results for the last day    Scheduled Meds  amLODIPine, 5 mg, Oral, Daily  aspirin EC, 325 mg, Oral, Daily  ceFAZolin, 2 g, Intravenous, Q8H  clonazePAM, 1 mg, Oral, BID  docusate sodium, 100 mg, Oral, BID  escitalopram, 20 mg, Oral, Daily  ferrous sulfate, 325 mg, Oral, Daily With Breakfast  hydroCHLOROthiazide, 12.5 mg, Oral, Daily  levothyroxine, 137 mcg, Oral, Q AM  paliperidone, 12 mg, Oral, Daily  tamsulosin, 0.4 mg, Oral, Nightly    Continuous Infusions   PRN Meds  •  acetaminophen  •  bisacodyl  •  HYDROcodone-acetaminophen  •  loperamide  •  melatonin  •  nitroglycerin  •  ondansetron **OR** ondansetron  •  [COMPLETED] Insert peripheral IV **AND** sodium chloride     Diet  Diet Regular; Cardiac    I have personally reviewed:  [x]  Laboratory   [x]  Microbiology   []  Radiology   [x]  EKG/Telemetry  SR on telemetry  []  Cardiology/Vascular   []  Pathology   []  Records     Assessment/Plan     Active Hospital Problems    Diagnosis  POA   • **MSSA (methicillin susceptible Staphylococcus aureus) infection [A49.01]  Unknown   • Infection of left knee (HCC) [M00.9]  Unknown   • Post-operative state [Z98.890]  Not Applicable   • Hypertension [I10]  Yes   • Disease of thyroid gland [E07.9]  Yes   •  Mental deficiency [F79]  Yes      Resolved Hospital Problems   No resolved problems to display.     57 y.o. male with history of left patella fracture s/p repair and revision. External fixator removed a month ago. Presented from facility with drainage from knee.    MSSA left knee infection  -IV antibiotics per infectious diseases  -wound MSSA (blood cx=contaminant)  -ortho does not plan surgery at this time    loose stools  -WBC up yesterday but better today continue to monitor and no fever  -stop colace    intellectual disability, impulse control disorder    hypothyroidism  -levothyroxine     anemia  -iron and percent sat low  -PO Fe  -hgb stable    HTN  -Control acceptable    hyperglycemia  -a1c 6.00    SCDs for DVT prophylaxis    Discussed with nursing staff    Discharge: 1 to 2 days when okay with consultants. In the meantime transfer to [facility] Jose Arnold MD  [facility]  11/06/22

## 2022-11-06 NOTE — PROGRESS NOTES
"Knee looking much better. No plan on surgery at this time. Plan course of IV followed by oral abx and close outpatient follow up    R \"Huang\" Carolina WHEATLEY MD  Orthopaedic Surgery  [facility]  [phone] - [place] [facility]  [phone] - [place] [facility]    "

## 2022-11-06 NOTE — PLAN OF CARE
Goal Outcome Evaluation:         A&O x 1-2, O2 at 2L via NC, transfer from 6N this shift, incontinent of bowel and bladder, brief and wrap in place, tolerating regular cardiac diet with minimal assistance, Q 2 turns, IV SL, meds whole with water, will continue to monitor.

## 2022-11-06 NOTE — PLAN OF CARE
"Goal Outcome Evaluation:  Plan of Care Reviewed With: patient        Progress: no change  Outcome Evaluation:     Cognitive impairment at baseline with limited ability to participate during assessment questions. Will answer \"yes\" or \"no\" and express other small gestures.     SR on telmetry.   Did have a 12 Beat run of V-tach on prior shift. [facility] notified. (see prior note)     1.5L via n/c.   Daily weights.     IV ABX given.       Q2 turning.   Heel offloading boots in place    SCD's on      No complaints of pain. No signs of distress.   Will continue to monitor.  "

## 2022-11-07 PROBLEM — E87.6 HYPOKALEMIA: Status: ACTIVE | Noted: 2022-11-07

## 2022-11-07 LAB
ANION GAP SERPL CALCULATED.3IONS-SCNC: 10.5 MMOL/L (ref 5–15)
BUN SERPL-MCNC: 6 MG/DL (ref 6–20)
BUN/CREAT SERPL: 13 (ref 7–25)
CALCIUM SPEC-SCNC: 8.4 MG/DL (ref 8.6–10.5)
CHLORIDE SERPL-SCNC: 99 MMOL/L (ref 98–107)
CO2 SERPL-SCNC: 29.5 MMOL/L (ref 22–29)
CREAT SERPL-MCNC: 0.46 MG/DL (ref 0.76–1.27)
DEPRECATED RDW RBC AUTO: 44 FL (ref 37–54)
EGFRCR SERPLBLD CKD-EPI 2021: 122 ML/MIN/1.73
ERYTHROCYTE [DISTWIDTH] IN BLOOD BY AUTOMATED COUNT: 13.5 % (ref 12.3–15.4)
GLUCOSE SERPL-MCNC: 107 MG/DL (ref 65–99)
HCT VFR BLD AUTO: 31 % (ref 37.5–51)
HGB BLD-MCNC: 10.1 G/DL (ref 13–17.7)
MCH RBC QN AUTO: 29 PG (ref 26.6–33)
MCHC RBC AUTO-ENTMCNC: 32.6 G/DL (ref 31.5–35.7)
MCV RBC AUTO: 89.1 FL (ref 79–97)
PLATELET # BLD AUTO: 449 10*3/MM3 (ref 140–450)
PMV BLD AUTO: 9.1 FL (ref 6–12)
POTASSIUM SERPL-SCNC: 3.1 MMOL/L (ref 3.5–5.2)
RBC # BLD AUTO: 3.48 10*6/MM3 (ref 4.14–5.8)
SODIUM SERPL-SCNC: 139 MMOL/L (ref 136–145)
WBC NRBC COR # BLD: 12.73 10*3/MM3 (ref 3.4–10.8)

## 2022-11-07 PROCEDURE — 85027 COMPLETE CBC AUTOMATED: CPT | Performed by: HOSPITALIST

## 2022-11-07 PROCEDURE — 99232 SBSQ HOSP IP/OBS MODERATE 35: CPT | Performed by: INTERNAL MEDICINE

## 2022-11-07 PROCEDURE — 25010000002 CEFAZOLIN IN DEXTROSE 2-4 GM/100ML-% SOLUTION: Performed by: HOSPITALIST

## 2022-11-07 PROCEDURE — 80048 BASIC METABOLIC PNL TOTAL CA: CPT | Performed by: HOSPITALIST

## 2022-11-07 RX ORDER — POTASSIUM CHLORIDE 750 MG/1
40 TABLET, FILM COATED, EXTENDED RELEASE ORAL AS NEEDED
Status: DISCONTINUED | OUTPATIENT
Start: 2022-11-07 | End: 2022-11-09 | Stop reason: HOSPADM

## 2022-11-07 RX ORDER — POTASSIUM CHLORIDE 1.5 G/1.77G
40 POWDER, FOR SOLUTION ORAL AS NEEDED
Status: DISCONTINUED | OUTPATIENT
Start: 2022-11-07 | End: 2022-11-09 | Stop reason: HOSPADM

## 2022-11-07 RX ORDER — POTASSIUM CHLORIDE 7.45 MG/ML
10 INJECTION INTRAVENOUS
Status: DISCONTINUED | OUTPATIENT
Start: 2022-11-07 | End: 2022-11-09 | Stop reason: HOSPADM

## 2022-11-07 RX ADMIN — FERROUS SULFATE TAB 325 MG (65 MG ELEMENTAL FE) 325 MG: 325 (65 FE) TAB at 09:08

## 2022-11-07 RX ADMIN — CEFAZOLIN SODIUM 2 G: 2 INJECTION, SOLUTION INTRAVENOUS at 02:15

## 2022-11-07 RX ADMIN — PALIPERIDONE 12 MG: 6 TABLET, EXTENDED RELEASE ORAL at 09:08

## 2022-11-07 RX ADMIN — TAMSULOSIN HYDROCHLORIDE 0.4 MG: 0.4 CAPSULE ORAL at 20:59

## 2022-11-07 RX ADMIN — CLONAZEPAM 1 MG: 1 TABLET ORAL at 20:59

## 2022-11-07 RX ADMIN — ESCITALOPRAM 20 MG: 20 TABLET, FILM COATED ORAL at 09:08

## 2022-11-07 RX ADMIN — POTASSIUM CHLORIDE 40 MEQ: 750 TABLET, EXTENDED RELEASE ORAL at 11:15

## 2022-11-07 RX ADMIN — HYDROCHLOROTHIAZIDE 12.5 MG: 12.5 TABLET ORAL at 09:07

## 2022-11-07 RX ADMIN — CEFAZOLIN SODIUM 2 G: 2 INJECTION, SOLUTION INTRAVENOUS at 17:08

## 2022-11-07 RX ADMIN — CLONAZEPAM 1 MG: 1 TABLET ORAL at 09:07

## 2022-11-07 RX ADMIN — LEVOTHYROXINE SODIUM 137 MCG: 0.14 TABLET ORAL at 05:49

## 2022-11-07 RX ADMIN — CEFAZOLIN SODIUM 2 G: 2 INJECTION, SOLUTION INTRAVENOUS at 09:10

## 2022-11-07 RX ADMIN — POTASSIUM CHLORIDE 40 MEQ: 750 TABLET, EXTENDED RELEASE ORAL at 14:45

## 2022-11-07 RX ADMIN — AMLODIPINE BESYLATE 5 MG: 5 TABLET ORAL at 09:08

## 2022-11-07 RX ADMIN — ASPIRIN 325 MG: 325 TABLET, COATED ORAL at 09:07

## 2022-11-07 NOTE — PROGRESS NOTES
Name: Lucien Morrison ADMIT: 2022   : 1965  PCP: Ran Bragg MD    MRN: [id number] LOS: 4 days   AGE/SEX: 57 y.o. male  ROOM: [facility]     Subjective   Subjective   Asleep on exam. No new issues per nursing    Review of Systems   Unable to perform ROS: Other        Objective   Objective   Vital Signs  Temp:  [97.1 °F (36.2 °C)-98.2 °F (36.8 °C)] 97.1 °F (36.2 °C)  Heart Rate:  [] 90  Resp:  [16-18] 16  BP: (123-170)/(67-91) 123/67  SpO2:  [94 %-98 %] 96 %  on  Flow (L/min):  [2] 2;   Device (Oxygen Therapy): nasal cannula  Body mass index is 22.43 kg/m².  Physical Exam  Vitals and nursing note reviewed.   Constitutional:       General: He is sleeping. He is not in acute distress.  HENT:      Head: Normocephalic.      Mouth/Throat:      Lips: Pink.   Eyes:      General: Lids are normal.   Cardiovascular:      Rate and Rhythm: Normal rate and regular rhythm.   Pulmonary:      Effort: Pulmonary effort is normal. No respiratory distress.      Breath sounds: No wheezing or rales.      Comments: O2 2L NC  Abdominal:      General: Bowel sounds are normal.      Palpations: Abdomen is soft.   Musculoskeletal:      Cervical back: Neck supple.      Right lower leg: No edema.      Left lower leg: No edema.   Skin:     General: Skin is warm and dry.   Neurological:      Comments: Sleeping       Results Review     I reviewed the patient's new clinical results.  Results from last 7 days   Lab Units 22  0644 22  0857 22  0822  0836   WBC 10*3/mm3 12.73* 12.21* 20.89* 6.66   HEMOGLOBIN g/dL 10.1* 9.9* 9.6* 11.2*   PLATELETS 10*3/mm3 449 436 416 307     Results from last 7 days   Lab Units 22  0644 22  0826 22  0836 22  0657 22  0826 22  1653   SODIUM mmol/L 139  --   --  139 140 140   POTASSIUM mmol/L 3.1*  --   --  3.5 3.5 3.5   CHLORIDE mmol/L 99  --   --  101 102 100   CO2 mmol/L 29.5*  --   --  32.1* 30.3* 33.0*   BUN mg/dL 6  --   --  10 12 14    CREATININE mg/dL 0.46* 0.68* 0.52* 0.57* 0.62* 0.77   GLUCOSE mg/dL 107*  --   --  106* 90 111*   EGFR mL/min/1.73 122.0 108.4 117.6 114.4 111.5 104.4     Results from last 7 days   Lab Units 11/01/22  1653   ALBUMIN g/dL 3.40*   BILIRUBIN mg/dL <0.2   ALK PHOS U/L 87   AST (SGOT) U/L 12   ALT (SGPT) U/L 10     Results from last 7 days   Lab Units 11/07/22  0644 11/03/22  0657 11/02/22  0826 11/01/22  1653   CALCIUM mg/dL 8.4* 8.5* 9.2 9.4   ALBUMIN g/dL  --   --   --  3.40*     Results from last 7 days   Lab Units 11/01/22  1653   LACTATE mmol/L 1.1     No results found for: HGBA1C, POCGLU    No radiology results for the last day  Scheduled Medications  amLODIPine, 5 mg, Oral, Daily  aspirin EC, 325 mg, Oral, Daily  ceFAZolin, 2 g, Intravenous, Q8H  clonazePAM, 1 mg, Oral, BID  escitalopram, 20 mg, Oral, Daily  ferrous sulfate, 325 mg, Oral, Daily With Breakfast  hydroCHLOROthiazide, 12.5 mg, Oral, Daily  levothyroxine, 137 mcg, Oral, Q AM  paliperidone, 12 mg, Oral, Daily  tamsulosin, 0.4 mg, Oral, Nightly    Infusions   Diet  Diet Regular; Cardiac       Assessment/Plan     Active Hospital Problems    Diagnosis  POA   • **MSSA (methicillin susceptible Staphylococcus aureus) infection [A49.01]  Yes   • Hypokalemia [E87.6]  Yes   • Infection of left knee (HCC) [M00.9]  Yes   • Post-operative state [Z98.890]  Not Applicable   • Hypertension [I10]  Yes   • Disease of thyroid gland [E07.9]  Yes   • Mental deficiency [F79]  Yes      Resolved Hospital Problems   No resolved problems to display.       57 y.o. male admitted with MSSA (methicillin susceptible Staphylococcus aureus) infection.    57 y.o. male with history of left patella fracture s/p repair and revision. External fixator removed a month ago. Presented from facility with drainage from knee.     MSSA left knee infection  -IV antibiotics per infectious diseases  -wound MSSA (blood cx=contaminant)  -ortho does not plan surgery at this time  -likely 6 weeks  antibiotics. ID repeating CRP and WBC in a.m.     loose stools  -WBC up yesterday but better today continue to monitor and no fever  -stopped colace     intellectual disability, impulse control disorder     hypothyroidism  -levothyroxine      anemia  -iron and percent sat low  -PO Fe  -hgb stable     HTN  -Control acceptable  -amlodipine     hyperglycemia  -a1c 6.00    Hypokalemia:  -Replete per protocol     · SCDs for DVT prophylaxis.  · Full code.  · Discussed with nursing staff.  · Anticipate discharge to SNU facility when cleared by consultants.      JULIO Hicks  [facility]  11/07/22  13:36 EST

## 2022-11-07 NOTE — PROGRESS NOTES
LOS: 4 days     Chief Complaint:  Follow-up MSSA L knee infection    Interval History:  No fever. Tolerating IV abx.  Denies pain. New pt to me        Vital Signs  Temp:  [97.1 °F (36.2 °C)-98.2 °F (36.8 °C)] 97.1 °F (36.2 °C)  Heart Rate:  [] 87  Resp:  [16-18] 16  BP: (137-170)/(72-94) 137/72    Physical Exam:  General: asleep but awakens easily, nad  ENT: MMM  Cardiovascular: NR  Respiratory: normal work of breathing   GI: Abdomen is soft  Musculoskeletal: L knee bandaged; minimal swelling and erythema   Skin: No rashes  Psychiatric: calm and pleasant    WBC 12.7, hgb 10, plt 449    Microbiology:  10/28 Left Knee Wound Cx: moderate growth MSSA (susc all abx tested)  11/1 BCx: coag-negative Staph in 1/2 sets = contaminant  11/1 Left Knee Wound Cx: MSSA     Radiology (personally reviewed report):  none     ASSESSMENT/PLAN:  1. MSSA infection  2. Left knee infection (prior hardware removed)  3. Intellectual disability  4. Hyperglycemia - A1c 6%  5. Contamination of blood culture      For MSSA infection of the left knee, continue cefazolin 2 g IV q8h.Likely 6 weeks in duration if no plans for surgery. WBC jumped but down from baseline.  Will repeat along with CRP in AM. The blood culture positive for coag-negative Staph in 1/2 sets is a contaminant.     ID will follow.

## 2022-11-08 PROBLEM — A04.72 C. DIFFICILE DIARRHEA: Status: ACTIVE | Noted: 2022-11-08

## 2022-11-08 LAB
BASOPHILS # BLD AUTO: 0.05 10*3/MM3 (ref 0–0.2)
BASOPHILS NFR BLD AUTO: 0.5 % (ref 0–1.5)
C DIFF GDH + TOXINS A+B STL QL IA.RAPID: POSITIVE
C DIFF TOX GENS STL QL NAA+PROBE: POSITIVE
CRP SERPL-MCNC: 5.88 MG/DL (ref 0–0.5)
DEPRECATED RDW RBC AUTO: 44.6 FL (ref 37–54)
EOSINOPHIL # BLD AUTO: 0.38 10*3/MM3 (ref 0–0.4)
EOSINOPHIL NFR BLD AUTO: 3.9 % (ref 0.3–6.2)
ERYTHROCYTE [DISTWIDTH] IN BLOOD BY AUTOMATED COUNT: 13.4 % (ref 12.3–15.4)
HCT VFR BLD AUTO: 29.1 % (ref 37.5–51)
HGB BLD-MCNC: 9.4 G/DL (ref 13–17.7)
IMM GRANULOCYTES # BLD AUTO: 0.05 10*3/MM3 (ref 0–0.05)
IMM GRANULOCYTES NFR BLD AUTO: 0.5 % (ref 0–0.5)
LYMPHOCYTES # BLD AUTO: 1.36 10*3/MM3 (ref 0.7–3.1)
LYMPHOCYTES NFR BLD AUTO: 13.9 % (ref 19.6–45.3)
MCH RBC QN AUTO: 29 PG (ref 26.6–33)
MCHC RBC AUTO-ENTMCNC: 32.3 G/DL (ref 31.5–35.7)
MCV RBC AUTO: 89.8 FL (ref 79–97)
MONOCYTES # BLD AUTO: 1.08 10*3/MM3 (ref 0.1–0.9)
MONOCYTES NFR BLD AUTO: 11 % (ref 5–12)
NEUTROPHILS NFR BLD AUTO: 6.86 10*3/MM3 (ref 1.7–7)
NEUTROPHILS NFR BLD AUTO: 70.2 % (ref 42.7–76)
NRBC BLD AUTO-RTO: 0 /100 WBC (ref 0–0.2)
PLATELET # BLD AUTO: 399 10*3/MM3 (ref 140–450)
PMV BLD AUTO: 9.1 FL (ref 6–12)
RBC # BLD AUTO: 3.24 10*6/MM3 (ref 4.14–5.8)
WBC NRBC COR # BLD: 9.78 10*3/MM3 (ref 3.4–10.8)

## 2022-11-08 PROCEDURE — 87493 C DIFF AMPLIFIED PROBE: CPT | Performed by: NURSE PRACTITIONER

## 2022-11-08 PROCEDURE — 99232 SBSQ HOSP IP/OBS MODERATE 35: CPT | Performed by: INTERNAL MEDICINE

## 2022-11-08 PROCEDURE — 25010000002 CEFAZOLIN IN DEXTROSE 2-4 GM/100ML-% SOLUTION: Performed by: HOSPITALIST

## 2022-11-08 PROCEDURE — 85025 COMPLETE CBC W/AUTO DIFF WBC: CPT | Performed by: INTERNAL MEDICINE

## 2022-11-08 PROCEDURE — C1751 CATH, INF, PER/CENT/MIDLINE: HCPCS

## 2022-11-08 PROCEDURE — 86140 C-REACTIVE PROTEIN: CPT | Performed by: INTERNAL MEDICINE

## 2022-11-08 PROCEDURE — 05HC33Z INSERTION OF INFUSION DEVICE INTO LEFT BASILIC VEIN, PERCUTANEOUS APPROACH: ICD-10-PCS | Performed by: INTERNAL MEDICINE

## 2022-11-08 PROCEDURE — 87449 NOS EACH ORGANISM AG IA: CPT | Performed by: NURSE PRACTITIONER

## 2022-11-08 PROCEDURE — 25010000002 CEFAZOLIN IN DEXTROSE 2-4 GM/100ML-% SOLUTION: Performed by: INTERNAL MEDICINE

## 2022-11-08 RX ORDER — VANCOMYCIN HYDROCHLORIDE 125 MG/1
125 CAPSULE ORAL EVERY 12 HOURS
Status: DISCONTINUED | OUTPATIENT
Start: 2022-11-18 | End: 2022-11-09 | Stop reason: HOSPADM

## 2022-11-08 RX ORDER — SODIUM CHLORIDE 0.9 % (FLUSH) 0.9 %
10 SYRINGE (ML) INJECTION EVERY 12 HOURS SCHEDULED
Status: CANCELLED | OUTPATIENT
Start: 2022-11-08

## 2022-11-08 RX ORDER — SODIUM CHLORIDE 0.9 % (FLUSH) 0.9 %
10 SYRINGE (ML) INJECTION EVERY 12 HOURS SCHEDULED
Status: DISCONTINUED | OUTPATIENT
Start: 2022-11-08 | End: 2022-11-09 | Stop reason: HOSPADM

## 2022-11-08 RX ORDER — VANCOMYCIN HYDROCHLORIDE 125 MG/1
125 CAPSULE ORAL EVERY 6 HOURS SCHEDULED
Status: DISCONTINUED | OUTPATIENT
Start: 2022-11-08 | End: 2022-11-09 | Stop reason: HOSPADM

## 2022-11-08 RX ORDER — SODIUM CHLORIDE 0.9 % (FLUSH) 0.9 %
10 SYRINGE (ML) INJECTION AS NEEDED
Status: CANCELLED | OUTPATIENT
Start: 2022-11-08

## 2022-11-08 RX ORDER — SODIUM CHLORIDE 0.9 % (FLUSH) 0.9 %
20 SYRINGE (ML) INJECTION AS NEEDED
Status: DISCONTINUED | OUTPATIENT
Start: 2022-11-08 | End: 2022-11-09 | Stop reason: HOSPADM

## 2022-11-08 RX ORDER — SODIUM CHLORIDE 0.9 % (FLUSH) 0.9 %
20 SYRINGE (ML) INJECTION AS NEEDED
Status: CANCELLED | OUTPATIENT
Start: 2022-11-08

## 2022-11-08 RX ORDER — SODIUM CHLORIDE 0.9 % (FLUSH) 0.9 %
10 SYRINGE (ML) INJECTION AS NEEDED
Status: DISCONTINUED | OUTPATIENT
Start: 2022-11-08 | End: 2022-11-09 | Stop reason: HOSPADM

## 2022-11-08 RX ADMIN — CLONAZEPAM 1 MG: 1 TABLET ORAL at 20:22

## 2022-11-08 RX ADMIN — VANCOMYCIN HYDROCHLORIDE 125 MG: 125 CAPSULE ORAL at 10:48

## 2022-11-08 RX ADMIN — VANCOMYCIN HYDROCHLORIDE 125 MG: 125 CAPSULE ORAL at 18:13

## 2022-11-08 RX ADMIN — CEFAZOLIN SODIUM 2 G: 2 INJECTION, SOLUTION INTRAVENOUS at 18:13

## 2022-11-08 RX ADMIN — CEFAZOLIN SODIUM 2 G: 2 INJECTION, SOLUTION INTRAVENOUS at 10:48

## 2022-11-08 RX ADMIN — PALIPERIDONE 12 MG: 6 TABLET, EXTENDED RELEASE ORAL at 08:48

## 2022-11-08 RX ADMIN — ASPIRIN 325 MG: 325 TABLET, COATED ORAL at 08:48

## 2022-11-08 RX ADMIN — CLONAZEPAM 1 MG: 1 TABLET ORAL at 08:48

## 2022-11-08 RX ADMIN — Medication 10 ML: at 20:22

## 2022-11-08 RX ADMIN — FERROUS SULFATE TAB 325 MG (65 MG ELEMENTAL FE) 325 MG: 325 (65 FE) TAB at 08:48

## 2022-11-08 RX ADMIN — CEFAZOLIN SODIUM 2 G: 2 INJECTION, SOLUTION INTRAVENOUS at 04:15

## 2022-11-08 RX ADMIN — LEVOTHYROXINE SODIUM 137 MCG: 0.14 TABLET ORAL at 08:48

## 2022-11-08 RX ADMIN — ESCITALOPRAM 20 MG: 20 TABLET, FILM COATED ORAL at 08:48

## 2022-11-08 RX ADMIN — TAMSULOSIN HYDROCHLORIDE 0.4 MG: 0.4 CAPSULE ORAL at 20:22

## 2022-11-08 NOTE — PROGRESS NOTES
"Nutrition Services    Patient Name:  Lucien Morrison  YOB: 1965  MRN: [id number]  Admit Date:  11/1/2022      PROGRESS NOTE - CLINICAL NUTRITION    Comments:     RD f/u. Pt positive for C. Diff today. Per RN notes, pt with several loose, foul-smelling BMs today. Pt's PO intake 50-75% of meals and is receiving Boost Glucose Control BID. No plan for surgical inventions per ortho. RD will continue to follow course.     Encounter Information         Trending Narrative 57 y.o. male admitted for redness and swelling of knee.     11/2: Nutrition following for report of reduced po intake. Pt able to provide minimal information due to intellectual disability and slurred speech. Pt has had a complicated recovery following a patella fracture, having drainage from the knee and MSSA infection. Pt has documented wt loss over the past 5 months.  Encouraged adequate po intake and ordered supplement. Will continue to follow.     Tests/Procedures No new tests/procedures     Current Nutrition Orders & Evaluation of Intake       Oral Nutrition     Current PO Diet Diet Regular; Cardiac   Supplement Boost Glucose Control, BID   PO Evaluation     Trending % PO Intake 50-75%    Factors Affecting Intake  diarrhea   --  Anthropometrics          Height    Weight Height: 172.7 cm (67.99\")  Weight: 67.8 kg (149 lb 7.6 oz) (11/08/22 0514)    BMI kg/m2 Body mass index is 22.73 kg/m².    Weight trend      Labs        Pertinent Labs Reviewed, listed below     Results from last 7 days   Lab Units 11/07/22  0644 11/05/22  0826 11/04/22  0836 11/03/22  0657 11/02/22  0826 11/01/22  1653   SODIUM mmol/L 139  --   --  139 140 140   POTASSIUM mmol/L 3.1*  --   --  3.5 3.5 3.5   CHLORIDE mmol/L 99  --   --  101 102 100   CO2 mmol/L 29.5*  --   --  32.1* 30.3* 33.0*   BUN mg/dL 6  --   --  10 12 14   CREATININE mg/dL 0.46* 0.68* 0.52* 0.57* 0.62* 0.77   CALCIUM mg/dL 8.4*  --   --  8.5* 9.2 9.4   BILIRUBIN mg/dL  --   --   --   --   --  <0.2 "   ALK PHOS U/L  --   --   --   --   --  87   ALT (SGPT) U/L  --   --   --   --   --  10   AST (SGOT) U/L  --   --   --   --   --  12   GLUCOSE mg/dL 107*  --   --  106* 90 111*     Results from last 7 days   Lab Units 11/08/22  0744 11/02/22  0826 11/01/22  1653   HEMOGLOBIN g/dL 9.4*   < > 11.0*   HEMATOCRIT % 29.1*   < > 34.4*   WBC 10*3/mm3 9.78   < > 9.05   ALBUMIN g/dL  --   --  3.40*    < > = values in this interval not displayed.     Results from last 7 days   Lab Units 11/08/22  0744 11/07/22  0644 11/06/22  0857 11/05/22  0826 11/04/22  0836   PLATELETS 10*3/mm3 399 449 436 416 307     COVID19   Date Value Ref Range Status   08/22/2022 Not Detected Not Detected - Ref. Range Final     Lab Results   Component Value Date    HGBA1C 6.00 (H) 11/02/2022          Medications            Scheduled Medications amLODIPine, 5 mg, Oral, Daily  aspirin EC, 325 mg, Oral, Daily  ceFAZolin, 2 g, Intravenous, Q8H  clonazePAM, 1 mg, Oral, BID  escitalopram, 20 mg, Oral, Daily  ferrous sulfate, 325 mg, Oral, Daily With Breakfast  hydroCHLOROthiazide, 12.5 mg, Oral, Daily  levothyroxine, 137 mcg, Oral, Q AM  paliperidone, 12 mg, Oral, Daily  tamsulosin, 0.4 mg, Oral, Nightly  vancomycin, 125 mg, Oral, Q6H  [START ON 11/18/2022] vancomycin, 125 mg, Oral, Q12H        Infusions      PRN Medications •  acetaminophen  •  HYDROcodone-acetaminophen  •  loperamide  •  melatonin  •  nitroglycerin  •  ondansetron **OR** ondansetron  •  potassium chloride **OR** potassium chloride **OR** potassium chloride  •  [COMPLETED] Insert peripheral IV **AND** sodium chloride     Physical Findings         Physical Appearance alert, oriented, on oxygen therapy    NFPE Not applicable   --   Edema  1+ (trace)- LLE    Gastrointestinal diarrhea, last bowel movement:11/7; + C. diff    Tubes/Drains none    Oral/Mouth Cavity poor dentition, teeth missing    Skin bruising   --  Intervention Goal         Intervention Goal(s) Meet estimated needs, Tolerate PO  , Maintain weight and PO intake goal %: 50-75%     Nutrition Intervention         RD Action Supplement provided, Encourage intake, Follow Tx Progress and Care plan reviewed     Nutrition Prescription         Diet Prescription     Supplement Prescription Boost Glucose Control, BID   EN/PN Prescription    New Prescription Ordered? Already ordered.    --  Monitor/Evaluation        Monitor I&O, PO intake, Supplement intake, Pertinent labs, Weight, Skin status, GI status   Education Education not appropriate at this time   --    RD to follow up per protocol.    Electronically signed by:  Kathleen Patel RD  11/08/22 12:42 EST

## 2022-11-08 NOTE — PROGRESS NOTES
[facility] Clinical [facility]acy Services: C. Difficile Medication Changes       Pharmacy has been consulted to look over Lucien Morrison's profile to check patient's medications for changes due to C. Difficile diagnosis per Dr. Fischer's request.       Current C. Diff Regimen: Vancomycin 125mg PO q6h     Assessment/Plan/Changes       1. Proton pump inhibitor: none    2. Antiperistalic agents or stool softeners/laxatives: discontinued prn bisacodyl       Thank you for allowing me to participate in your patient's care.  Please call pharmacy with any questions or concerns.     Maddi Lemus, PharmD  Pharmacy Resident

## 2022-11-08 NOTE — CASE MANAGEMENT/SOCIAL WORK
Continued Stay Note  [facility]     Patient Name: Lucien Morrison  MRN: [id number]  Today's Date: 11/8/2022    Admit Date: 11/1/2022    Plan: Return to [facility]   Discharge Plan     Row Name 11/08/22 1620       Plan    Plan Return to [facility]    Plan Comments CCP made outbound call to the [facility] this date at 11:50am, no answer, CCP left , awaiting a return call. CCP made an outobound call to [facility] this date at 12:37am regarding stretcher transport, all stretcher transports booked for today. CCP made outbound call to [facility] this date at 12:38pm, all stretchers booked for today. CCP made outbound call to [facility] this date at 3:07pm, no answer left . CCP made outbound call to [facility] this date at 4:16pm, spoke with Julissa and scheduled stretcher transportation for tomorrow afternoon at 1pm. CCP notified patient's nurse this date via phone at 4:20pm.               Discharge Codes    No documentation.               Expected Discharge Date and Time     Expected Discharge Date Expected Discharge Time    Nov 8, 2022

## 2022-11-08 NOTE — CASE MANAGEMENT/SOCIAL WORK
"Physicians Statement of Medical Necessity for  Ambulance Transportation    GENERAL INFORMATION     Name: Lucien Morrison  YOB: 1965  Medicare #: [id number]  Transport Date: 11/9/2022 (Valid for round trips this date, or for scheduled repetitive trips for 60 days from the date signed below.)  Origin: [facility]  Destination: [facility]  Is the Patient's stay covered under Medicare Part A (PPS/DRG?)Yes  Closest appropriate facility? No, reason transport to more distant facility is required: this is the facility patient lives at as a long term care resident.  If this a hosp-hosp transfer? No  Is this a hospice patient? No    MEDICAL NECESSITY QUESTIONAIRE    Ambulance Transportation is medically necessary only if other means of transportation are contraindicated or would be potentially harmful to the patient.  To meet this requirement, the patient must be either \"bed confined\" or suffer from a condition such that transport by means other than an ambulance is contraindicated by the patient's condition.  The following questions must be answered by the healthcare professional signing below for this form to be valid:     1) Describe the MEDICAL CONDITION (physical and/or mental) of this patient AT THE TIME OF AMBULANCE TRANSPORT that requires the patient to be transported in an ambulance, and why transport by other means is contraindicated by the patient's condition:   Past Medical History:   Diagnosis Date   • Anemia    • Anxiety    • Cataract     bilateral   • Closed displaced transverse fracture of left patella 08/12/2022   • Colitis    • Depression    • Disease of thyroid gland    • Diverticulitis    • Hard to intubate    • Hemorrhoids    • History of BPH    • Hydrocele in adult    • Hyperopia    • Hypertension    • Impulse control disorder    • Mental deficiency    • Seasonal allergies    • Shingles       Past Surgical History:   Procedure Laterality Date   • BACK SURGERY " "     nodule removed   • HARDWARE REMOVAL Left 10/3/2022    Procedure: REMOVAL LEFT LEG EXTERNAL  FIXATOR;  Surgeon: Juan R Figueroa II, MD;  Location: [facility];  Service: Orthopedics;  Laterality: Left;   • ORCHIECTOMY Left    • ORIF HIP FRACTURE Right    • PATELLA OPEN REDUCTION INTERNAL FIXATION Left 8/15/2022    Procedure: PATELLA OPEN REDUCTION INTERNAL FIXATION REVISION AND TENDON REPAIR with EXTERNAL FIXATOR APPLICATOR;  Surgeon: Juan R Figueroa II, MD;  Location: [facility];  Service: Orthopedics;  Laterality: Left;      2) Is this patient \"bed confined\" as defined below?Yes   To be \"bed confined\" the patient must satisfy all three of the following criteria:  (1) unable to get up from bed without assistance; AND (2) unable to ambulate;  AND (3) unable to sit in a chair or wheelchair.  3) Can this patient safely be transported by car or wheelchair van (I.e., may safely sit during transport, without an attendant or monitoring?)No   4. In addition to completing questions 1-3 above, please check any of the following conditions that apply*:          *Note: supporting documentation for any boxes checked must be maintained in the patient's medical records Other Patient is non-ambulatory.      SIGNATURE OF PHYSICIAN OR OTHER AUTHORIZED HEALTHCARE PROFESSIONAL    I certify that the above information is true and correct based on my evaluation of this patient, and represent that the patient requires transport by ambulance and that other forms of transport are contraindicated.  I understand that this information will be used by the Centers for Medicare and Medicaid Services (CMS) to support the determiniation of medical necessity for ambulance services, and I represent that I have personal knowledge of the patient's condition at the time of transport.       If this box is checked, I also certify that the patient is physically or mentally incapable of signing the ambulance service's claim form " and that the institution with which I am affiliated has furnished care, services or assistance to the patient.  My signature below is made on behalf of the patient pursuant to 42 .36(b)(4). In accordance with 42 .37, the specific reason(s) that the patient is physically or mentally incapable of signing the claim for is as follows:     Signature of Physician or Healthcare Professional  Date/Time:   11/8/2022  16:31 EST       (For Scheduled repetitive transport, this form is not valid for transports performed more than 60 days after this date).                                                                                                                                            --------------------------------------------------------------------------------------------  Printed Name and Credentials of Physician or Authorized Healthcare Professional     *Form must be signed by patient's attending physician for scheduled, repetitive transports,.  For non-repetitive ambulance transports, if unable to obtain the signature of the attending physician, any of the following may sign (please select below):     Physician  Clinical Nurse Specialist  Registered Nurse     Physician Assistant  Discharge Planner  Licensed Practical Nurse     Nurse Practitioner  X

## 2022-11-08 NOTE — PLAN OF CARE
Goal Outcome Evaluation:               Patient resting in bed.  Alert and oriented x 4, on 2L O2 via NC, Q2 turn, vitals stable.  He has had several bowel movements this shift that have been loose and mucous with a foul odor. Stool specimen pending for c-diff rule out.  Contact spore isolation till results are back.  Safety precautions in use.  Will continue to monitor.

## 2022-11-08 NOTE — PROGRESS NOTES
LOS: 5 days     Chief Complaint:  Follow-up MSSA L knee infection and C diff    Interval History:  Loose stools. + C diff PCR and Ag. Starting oral vancomycin. NO plans for surgical intervention on the left knee.     ROS: no CP or vomiting    Vital Signs  Temp:  [97.1 °F (36.2 °C)-99.5 °F (37.5 °C)] 97.7 °F (36.5 °C)  Heart Rate:  [68-95] 68  Resp:  [16] 16  BP: (106-128)/(66-73) 106/66    Physical Exam:  General: NAD  Eyes: no scleral icterus  ENT: MMM  Cardiovascular: NR  Respiratory: normal work of breathing; no wheezing  GI: Abdomen is soft, not distended  :  no Hester catheter  Musculoskeletal: L knee bandaged  Skin: No rashes  Psychiatric: calm and pleasant    Meds:    Current Facility-Administered Medications:   •  acetaminophen (TYLENOL) tablet 650 mg, 650 mg, Oral, Q4H PRN, Elgin Arnold MD, 650 mg at 11/05/22 1734  •  amLODIPine (NORVASC) tablet 5 mg, 5 mg, Oral, Daily, Elgin Arnold MD, 5 mg at 11/07/22 0908  •  aspirin EC tablet 325 mg, 325 mg, Oral, Daily, Elgin Arnold MD, 325 mg at 11/07/22 0907  •  ceFAZolin in dextrose (ANCEF) IVPB solution 2 g, 2 g, Intravenous, Q8H, Elgin Arnold MD, Last Rate: 0 mL/hr at 11/03/22 1104, 2 g at 11/08/22 0415  •  clonazePAM (KlonoPIN) tablet 1 mg, 1 mg, Oral, BID, Elgin Arnold MD, 1 mg at 11/07/22 2059  •  escitalopram (LEXAPRO) tablet 20 mg, 20 mg, Oral, Daily, Elgin Arnold MD, 20 mg at 11/07/22 0908  •  ferrous sulfate tablet 325 mg, 325 mg, Oral, Daily With Breakfast, Elgin Arnold MD, 325 mg at 11/07/22 0908  •  hydroCHLOROthiazide (HYDRODIURIL) tablet 12.5 mg, 12.5 mg, Oral, Daily, Elgin Arnold MD, 12.5 mg at 11/07/22 0907  •  HYDROcodone-acetaminophen (NORCO) 5-325 MG per tablet 1 tablet, 1 tablet, Oral, Q4H PRN, Elgin Arnold MD  •  levothyroxine (SYNTHROID, LEVOTHROID) tablet 137 mcg, 137 mcg, Oral, Q AM, Elgin Arnold MD, 137 mcg at 11/07/22 0549  •  loperamide (IMODIUM) capsule 2 mg, 2 mg, Oral, 4x Daily PRN,  Elgin Arnold MD, 2 mg at 11/05/22 1734  •  melatonin tablet 3 mg, 3 mg, Oral, Nightly PRN, Elgin Arnold MD  •  nitroglycerin (NITROSTAT) SL tablet 0.4 mg, 0.4 mg, Sublingual, Q5 Min PRN, Elgin Arnold MD  •  ondansetron (ZOFRAN) tablet 4 mg, 4 mg, Oral, Q6H PRN **OR** ondansetron (ZOFRAN) injection 4 mg, 4 mg, Intravenous, Q6H PRN, Elgin Arnold MD  •  paliperidone (INVEGA) 24 hr tablet 12 mg, 12 mg, Oral, Daily, Elgin Arnold MD, 12 mg at 11/07/22 0908  •  Pharmacy Consult, , Does not apply, Continuous PRN, Mk Fischer MD  •  potassium chloride (K-DUR,KLOR-CON) ER tablet 40 mEq, 40 mEq, Oral, PRN, 40 mEq at 11/07/22 1445 **OR** potassium chloride (KLOR-CON) packet 40 mEq, 40 mEq, Oral, PRN **OR** potassium chloride 10 mEq in 100 mL IVPB, 10 mEq, Intravenous, Q1H PRN, Radha Pal, JULIO  •  [COMPLETED] Insert peripheral IV, , , Once **AND** sodium chloride 0.9 % flush 10 mL, 10 mL, Intravenous, PRN, Elgin Arnold MD, 10 mL at 11/05/22 2230  •  tamsulosin (FLOMAX) 24 hr capsule 0.4 mg, 0.4 mg, Oral, Nightly, Elgin Arnold MD, 0.4 mg at 11/07/22 2059  •  vancomycin (VANCOCIN) capsule 125 mg, 125 mg, Oral, Q6H, Mk Fischer MD  •  [START ON 11/18/2022] vancomycin (VANCOCIN) capsule 125 mg, 125 mg, Oral, Q12H, Mk Fischer MD    LABS:  CBC, BMP, and micro reviewed today  Lab Results   Component Value Date    WBC 9.78 11/08/2022    HGB 9.4 (L) 11/08/2022    HCT 29.1 (L) 11/08/2022    MCV 89.8 11/08/2022     11/08/2022     Lab Results   Component Value Date    GLUCOSE 107 (H) 11/07/2022    CALCIUM 8.4 (L) 11/07/2022     11/07/2022    K 3.1 (L) 11/07/2022    CO2 29.5 (H) 11/07/2022    CL 99 11/07/2022    BUN 6 11/07/2022    CREATININE 0.46 (L) 11/07/2022    BCR 13.0 11/07/2022    ANIONGAP 10.5 11/07/2022     Lab Results   Component Value Date    CRP 0.64 (H) 11/04/2022     Lab Results   Component Value Date    HGBA1C 6.00 (H)  11/02/2022       Microbiology:  10/28 Left Knee Wound Cx: moderate growth MSSA (susc all abx tested)  11/1 BCx: coag-negative Staph in 1/2 sets = contaminant  11/1 Left Knee Wound Cx: MSSA  11/8 C diff PCR and Ag positive     Radiology (personally reviewed report):  none     ASSESSMENT/PLAN:  1. MSSA infection  2. Left knee infection (prior hardware removed)  3. Intellectual disability  4. Hyperglycemia - A1c 6%  5. Contamination of blood culture   6. C diff diarrhea -new     For MSSA infection of the left knee, continue cefazolin 2 g IV q8h x 6 weeks w/ stop date 12/13/22 at which time he will follow-up w/ me in the ID clinic. I will order a PICC. Weekly CBC w/ diff, Crt, and CRP faxed to me at [phone].     For new diagnosis C diff, start vancomycin 125 mg PO q6h x 10 days then transition to vancomycin 125 mg PO q12h through 12/18/22 which will serve as prophylaxis while he remains on systemic antibiotics.     Thank you for allowing me to be involved in the care of this patient. Infectious diseases will sign off at this time with antibiotics plan in place, but please call me at [phone] if any further ID questions or new ID concerns.

## 2022-11-08 NOTE — SIGNIFICANT NOTE
11/08/22 1522   PICC Single Lumen 11/08/22 Left Basilic   Placement Date/Time: 11/08/22 1520   Hand Hygiene Completed: Yes  Size (Fr): 4  Description (optional): [id number]   exp 11-  Length (cm): 41 cm  Orientation: Left  Location: Basilic  Site Prep: Chlorhexidine isopropyl alcohol  All 5 Sterile Joshi...   #1 Lumen Status Blood return noted;Capped;Flushed;Normal saline locked   Length randal (cm) 41 cm   Extremity Circumference (cm) 27 cm   Dressing Type Border Dressing;Transparent;Securing device;Antimicrobial dressing/disc   Dressing Change Due 11/15/22   Indication/Daily Review of Necessity blood sampling;intravenous medication therapy;intravenous fluid therapy         PICC IS APPROVED FOR USE.      3 NEEDLES, 2 GUIDEWIRES AND 1 SCALPEL ACCOUNTED FOR AND DISPOSED OF PROPERLY

## 2022-11-08 NOTE — PLAN OF CARE
Goal Outcome Evaluation: Pt is on 2 L nc. Only two loose stools this shift. PICC line was placed today. Left knee bandage intact. Plan is to D/c transportation set up for tomorrow @ 1 p.

## 2022-11-08 NOTE — PROGRESS NOTES
Name: Lucien Morrison ADMIT: 2022   : 1965  PCP: Ran Bragg MD    MRN: [id number] LOS: 5 days   AGE/SEX: 57 y.o. male  ROOM: [facility]/     Subjective   Subjective   Seen this morning, eating breakfast. +diarrhea. Denies fever, abd pain/cramping. Appetite wnl. Slept well    Review of Systems   Constitutional: Negative for appetite change and fever.   HENT: Negative for congestion.    Respiratory: Negative for shortness of breath.    Cardiovascular: Negative for chest pain.   Gastrointestinal: Positive for diarrhea. Negative for abdominal pain.   Genitourinary: Negative for difficulty urinating.   Musculoskeletal: Negative for arthralgias.   Skin: Negative for rash.   Neurological: Negative for headaches.   Psychiatric/Behavioral: Negative for sleep disturbance.        Objective   Objective   Vital Signs  Temp:  [97.2 °F (36.2 °C)-99.5 °F (37.5 °C)] 98 °F (36.7 °C)  Heart Rate:  [68-95] 93  Resp:  [16-18] 18  BP: ()/(66-73) 99/66  SpO2:  [93 %-97 %] 96 %  on  Flow (L/min):  [2] 2;   Device (Oxygen Therapy): nasal cannula  Body mass index is 22.73 kg/m².  Physical Exam  Vitals and nursing note reviewed.   Constitutional:       General: He is not in acute distress.     Appearance: He is ill-appearing. He is not toxic-appearing.   HENT:      Head: Normocephalic.      Mouth/Throat:      Mouth: Mucous membranes are moist.   Eyes:      Conjunctiva/sclera: Conjunctivae normal.   Cardiovascular:      Rate and Rhythm: Normal rate and regular rhythm.   Pulmonary:      Effort: Pulmonary effort is normal. No respiratory distress.      Breath sounds: Normal breath sounds.   Abdominal:      General: Bowel sounds are normal.      Palpations: Abdomen is soft.      Tenderness: There is no abdominal tenderness.   Musculoskeletal:      Cervical back: Neck supple.      Right lower leg: No edema.      Left lower leg: No edema.   Skin:     General: Skin is warm and dry.   Neurological:      Mental Status: He is  alert. Mental status is at baseline.   Psychiatric:         Mood and Affect: Mood normal.         Behavior: Behavior normal.       Results Review     I reviewed the patient's new clinical results.  Results from last 7 days   Lab Units 11/08/22  0744 11/07/22  0644 11/06/22  0857 11/05/22 0826   WBC 10*3/mm3 9.78 12.73* 12.21* 20.89*   HEMOGLOBIN g/dL 9.4* 10.1* 9.9* 9.6*   PLATELETS 10*3/mm3 399 449 436 416     Results from last 7 days   Lab Units 11/07/22  0644 11/05/22  0826 11/04/22  0836 11/03/22  0657 11/02/22  0826 11/01/22  1653   SODIUM mmol/L 139  --   --  139 140 140   POTASSIUM mmol/L 3.1*  --   --  3.5 3.5 3.5   CHLORIDE mmol/L 99  --   --  101 102 100   CO2 mmol/L 29.5*  --   --  32.1* 30.3* 33.0*   BUN mg/dL 6  --   --  10 12 14   CREATININE mg/dL 0.46* 0.68* 0.52* 0.57* 0.62* 0.77   GLUCOSE mg/dL 107*  --   --  106* 90 111*   EGFR mL/min/1.73 122.0 108.4 117.6 114.4 111.5 104.4     Results from last 7 days   Lab Units 11/01/22  1653   ALBUMIN g/dL 3.40*   BILIRUBIN mg/dL <0.2   ALK PHOS U/L 87   AST (SGOT) U/L 12   ALT (SGPT) U/L 10     Results from last 7 days   Lab Units 11/07/22  0644 11/03/22  0657 11/02/22  0826 11/01/22  1653   CALCIUM mg/dL 8.4* 8.5* 9.2 9.4   ALBUMIN g/dL  --   --   --  3.40*     Results from last 7 days   Lab Units 11/01/22  1653   LACTATE mmol/L 1.1     No results found for: HGBA1C, POCGLU    No radiology results for the last day  Scheduled Medications  amLODIPine, 5 mg, Oral, Daily  aspirin EC, 325 mg, Oral, Daily  ceFAZolin, 2 g, Intravenous, Q8H  clonazePAM, 1 mg, Oral, BID  escitalopram, 20 mg, Oral, Daily  ferrous sulfate, 325 mg, Oral, Daily With Breakfast  hydroCHLOROthiazide, 12.5 mg, Oral, Daily  levothyroxine, 137 mcg, Oral, Q AM  paliperidone, 12 mg, Oral, Daily  tamsulosin, 0.4 mg, Oral, Nightly  vancomycin, 125 mg, Oral, Q6H  [START ON 11/18/2022] vancomycin, 125 mg, Oral, Q12H    Infusions   Diet  Diet Regular; Cardiac       Assessment/Plan     Active Hospital  Problems    Diagnosis  POA   • **MSSA (methicillin susceptible Staphylococcus aureus) infection [A49.01]  Yes   • C. difficile diarrhea [A04.72]  Yes   • Hypokalemia [E87.6]  Yes   • Infection of left knee (HCC) [M00.9]  Yes   • Post-operative state [Z98.890]  Not Applicable   • Hypertension [I10]  Yes   • Disease of thyroid gland [E07.9]  Yes   • Mental deficiency [F79]  Yes      Resolved Hospital Problems   No resolved problems to display.       57 y.o. male admitted with MSSA (methicillin susceptible Staphylococcus aureus) infection.    57 y.o. male with history of left patella fracture s/p repair and revision. External fixator removed a month ago. Presented from facility with drainage from knee.     MSSA left knee infection/C diff   -IV antibiotics per infectious diseases  -wound MSSA (blood cx=contaminant)  -ortho does not plan surgery at this time  -Planning 6 weeks antibiotics with stop date 12/13/22. Will need weekly CBC w/diff, Cr, and CRP faxed to Dr. Fischer and follow up in ID clinic  -Started on po Vanc q 6h x 10 days for c diff and will continue BID through 12/18 to serve as prophylaxis while on systemic antibiotics per ID  -Plan to place PICC line today     loose stools  -WBC up yesterday, wnl today  -stopped colace  -+ cdiff, plan as above     intellectual disability, impulse control disorder     hypothyroidism  -levothyroxine      anemia  -iron and percent sat low  -PO Fe  -hgb stable     HTN  -Control acceptable  -amlodipine     hyperglycemia  -a1c 6.00     Hypokalemia:  -Replete per protocol       · SCDs for DVT prophylaxis.  · Full code.  · Discussed with patient and CCP.  · Anticipate discharge to SNU facility today if PICC placed and arrangements are made vs tomorrow      JULIO Hicks  [facility]  11/08/22  13:44 EST

## 2022-11-09 VITALS
WEIGHT: 149.25 LBS | HEIGHT: 68 IN | OXYGEN SATURATION: 94 % | RESPIRATION RATE: 18 BRPM | BODY MASS INDEX: 22.62 KG/M2 | SYSTOLIC BLOOD PRESSURE: 152 MMHG | HEART RATE: 88 BPM | DIASTOLIC BLOOD PRESSURE: 82 MMHG | TEMPERATURE: 98.4 F

## 2022-11-09 LAB
ANION GAP SERPL CALCULATED.3IONS-SCNC: 9.7 MMOL/L (ref 5–15)
BUN SERPL-MCNC: 8 MG/DL (ref 6–20)
BUN/CREAT SERPL: 18.2 (ref 7–25)
CALCIUM SPEC-SCNC: 8.5 MG/DL (ref 8.6–10.5)
CHLORIDE SERPL-SCNC: 102 MMOL/L (ref 98–107)
CO2 SERPL-SCNC: 25.3 MMOL/L (ref 22–29)
CREAT SERPL-MCNC: 0.44 MG/DL (ref 0.76–1.27)
EGFRCR SERPLBLD CKD-EPI 2021: 123.6 ML/MIN/1.73
GLUCOSE SERPL-MCNC: 95 MG/DL (ref 65–99)
POTASSIUM SERPL-SCNC: 3.6 MMOL/L (ref 3.5–5.2)
SODIUM SERPL-SCNC: 137 MMOL/L (ref 136–145)

## 2022-11-09 PROCEDURE — 25010000002 CEFAZOLIN IN DEXTROSE 2-4 GM/100ML-% SOLUTION: Performed by: INTERNAL MEDICINE

## 2022-11-09 PROCEDURE — 80048 BASIC METABOLIC PNL TOTAL CA: CPT | Performed by: NURSE PRACTITIONER

## 2022-11-09 RX ORDER — VANCOMYCIN HYDROCHLORIDE 125 MG/1
125 CAPSULE ORAL EVERY 12 HOURS
Qty: 60 CAPSULE | Refills: 1
Start: 2022-11-18 | End: 2022-12-13

## 2022-11-09 RX ORDER — VANCOMYCIN HYDROCHLORIDE 125 MG/1
125 CAPSULE ORAL EVERY 6 HOURS SCHEDULED
Qty: 37 CAPSULE | Refills: 0
Start: 2022-11-09 | End: 2022-11-19

## 2022-11-09 RX ORDER — CEFAZOLIN SODIUM 2 G/100ML
2 INJECTION, SOLUTION INTRAVENOUS EVERY 8 HOURS
Qty: 9000 ML | Refills: 1
Start: 2022-11-09 | End: 2022-12-13

## 2022-11-09 RX ADMIN — VANCOMYCIN HYDROCHLORIDE 125 MG: 125 CAPSULE ORAL at 05:27

## 2022-11-09 RX ADMIN — HYDROCHLOROTHIAZIDE 12.5 MG: 12.5 TABLET ORAL at 09:20

## 2022-11-09 RX ADMIN — ESCITALOPRAM 20 MG: 20 TABLET, FILM COATED ORAL at 09:17

## 2022-11-09 RX ADMIN — CEFAZOLIN SODIUM 2 G: 2 INJECTION, SOLUTION INTRAVENOUS at 09:17

## 2022-11-09 RX ADMIN — VANCOMYCIN HYDROCHLORIDE 125 MG: 125 CAPSULE ORAL at 12:43

## 2022-11-09 RX ADMIN — PALIPERIDONE 12 MG: 6 TABLET, EXTENDED RELEASE ORAL at 09:19

## 2022-11-09 RX ADMIN — LEVOTHYROXINE SODIUM 137 MCG: 0.14 TABLET ORAL at 05:27

## 2022-11-09 RX ADMIN — ASPIRIN 325 MG: 325 TABLET, COATED ORAL at 09:17

## 2022-11-09 RX ADMIN — AMLODIPINE BESYLATE 5 MG: 5 TABLET ORAL at 09:20

## 2022-11-09 RX ADMIN — Medication 10 ML: at 09:17

## 2022-11-09 RX ADMIN — CEFAZOLIN SODIUM 2 G: 2 INJECTION, SOLUTION INTRAVENOUS at 01:10

## 2022-11-09 RX ADMIN — FERROUS SULFATE TAB 325 MG (65 MG ELEMENTAL FE) 325 MG: 325 (65 FE) TAB at 09:17

## 2022-11-09 RX ADMIN — CLONAZEPAM 1 MG: 1 TABLET ORAL at 09:17

## 2022-11-09 NOTE — PROGRESS NOTES
Minimal drainage on dressing this morning.  Plan 6 weeks IV antibiotics.  Patient also with C. difficile.  This has elevated his CRP.  No change in the appearance of the knee

## 2022-11-09 NOTE — PLAN OF CARE
Goal Outcome Evaluation:           Progress: no change  Outcome Evaluation: VSS, patient states he is in no pain currently. Still having loose stools voiding per brief. Was unable to take midnight vanc, but did take morning dose. Q2 turn. No verbal complaints at this time

## 2022-11-09 NOTE — PLAN OF CARE
Goal Outcome Evaluation: Pt is on 2 L nc. No loose stools this shift. PICC line flushed and blood return noted. Left knee bandage intact. Plan is to D/c today, transport scheduled for 1 pm today.

## 2022-11-09 NOTE — DISCHARGE SUMMARY
Patient Name: Lucien Morrison  : 1965  MRN: [id number]    Date of Admission: 2022  Date of Discharge:  2022  Primary Care Physician: Ran Bragg MD      Chief Complaint:   Post-op Problem      Discharge Diagnoses     Active Hospital Problems    Diagnosis  POA   • **MSSA (methicillin susceptible Staphylococcus aureus) infection [A49.01]  Yes   • C. difficile diarrhea [A04.72]  Yes   • Hypokalemia [E87.6]  Yes   • Infection of left knee (HCC) [M00.9]  Yes   • Post-operative state [Z98.890]  Not Applicable   • Hypertension [I10]  Yes   • Disease of thyroid gland [E07.9]  Yes   • Mental deficiency [F79]  Yes      Resolved Hospital Problems   No resolved problems to display.        Hospital Course     Mr. Morrison is a 57 y.o. male with a history of mental disability, recent patella fracture s/p repair/reivision who presented to [facility] initially complaining of redness and swelling of lt knee.  Please see the admitting history and physical for further details.  He was found to have lt knee infection and was admitted to the hospital for further evaluation and treatment.  He was started on IV antibiotics. ID and Ortho have followed. He was found to have MSSA infection w/recommendation for IV cefazolin x 6 weeks w/stop date 22. Ortho did not feel surgical intervention was warranted. He will need weekly CBC w/diff, Creatinine, and CRP faxed to Dr. Fischer, [phone]. He began having diarrhea and elevated WBC, found to have c difficile. He has been started on po Vanc to continue q 6 hrs x 10 days total then transition to Vanc 125 mg po BID through 22 for prophylaxis while on systemic antibiotics. He has had PICC line placement. He will follow up with ID in clinic. Diarrhea has improved. He is afebrile. WBC has normalized. Pain is controlled with tylenol. Medically he is stable for return to SNF.    Day of Discharge     Subjective:  Eating breakfast on exam. Denies  pain. Afebrile. Per nursing, stools have lessened.     Physical Exam:  Temp:  [96.4 °F (35.8 °C)-97.9 °F (36.6 °C)] 97 °F (36.1 °C)  Heart Rate:  [] 67  Resp:  [18] 18  BP: (111-145)/(69-88) 111/69  Body mass index is 22.7 kg/m².  Physical Exam  Vitals and nursing note reviewed.   Constitutional:       General: He is not in acute distress.  HENT:      Head: Normocephalic.      Mouth/Throat:      Mouth: Mucous membranes are moist.   Eyes:      Conjunctiva/sclera: Conjunctivae normal.   Cardiovascular:      Rate and Rhythm: Normal rate and regular rhythm.   Pulmonary:      Effort: Pulmonary effort is normal. No respiratory distress.      Breath sounds: No wheezing or rales.   Abdominal:      General: Bowel sounds are normal. There is no distension.      Palpations: Abdomen is soft.   Musculoskeletal:      Cervical back: Neck supple.      Right lower leg: No edema.      Left lower leg: No edema.   Skin:     General: Skin is warm and dry.      Comments: Dressing lt knee CDI   Neurological:      Mental Status: He is alert. Mental status is at baseline.   Psychiatric:         Mood and Affect: Mood normal.         Behavior: Behavior normal.         Consultants     Consult Orders (all) (From admission, onward)     Start     Ordered    11/08/22 0823  Inpatient IV Team Consult PICC 1 Lumen  Once        Provider:  (Not yet assigned)    11/08/22 0822 11/01/22 2023  Inpatient Infectious Diseases Consult  Once        Specialty:  Infectious Diseases  Provider:  Jennifer Titus MD    11/01/22 2022 11/01/22 2023  Inpatient Orthopedic Surgery Consult  Once        Specialty:  Orthopedic Surgery  Provider:  Juan R Figueroa II, MD    11/01/22 2023 11/01/22 1745  [facility] (on-call MD unless specified) Details  Once        Specialty:  Hospitalist  Provider:  Michela Peralta MD    11/01/22 1744              Procedures     * Surgery not found *      Imaging Results (All)     None            Pertinent Labs     Results  from last 7 days   Lab Units 11/08/22  0744 11/07/22  0644 11/06/22  0857 11/05/22  0826   WBC 10*3/mm3 9.78 12.73* 12.21* 20.89*   HEMOGLOBIN g/dL 9.4* 10.1* 9.9* 9.6*   PLATELETS 10*3/mm3 399 449 436 416     Results from last 7 days   Lab Units 11/09/22  0704 11/07/22  0644 11/05/22  0826 11/04/22  0836 11/03/22  0657   SODIUM mmol/L 137 139  --   --  139   POTASSIUM mmol/L 3.6 3.1*  --   --  3.5   CHLORIDE mmol/L 102 99  --   --  101   CO2 mmol/L 25.3 29.5*  --   --  32.1*   BUN mg/dL 8 6  --   --  10   CREATININE mg/dL 0.44* 0.46* 0.68* 0.52* 0.57*   GLUCOSE mg/dL 95 107*  --   --  106*   EGFR mL/min/1.73 123.6 122.0 108.4 117.6 114.4       Results from last 7 days   Lab Units 11/09/22  0704 11/07/22  0644 11/03/22  0657   CALCIUM mg/dL 8.5* 8.4* 8.5*               Invalid input(s): LDLCALC          Test Results Pending at Discharge       Discharge Details        Discharge Medications      New Medications      Instructions Start Date   ceFAZolin in dextrose 2-4 GM/100ML-% solution IVPB  Commonly known as: ANCEF   2 g, Intravenous, Every 8 Hours      vancomycin 125 MG capsule  Commonly known as: VANCOCIN   125 mg, Oral, Every 6 Hours Scheduled      vancomycin 125 MG capsule  Commonly known as: VANCOCIN   125 mg, Oral, Every 12 Hours   Start Date: November 18, 2022        Continue These Medications      Instructions Start Date   acetaminophen 325 MG tablet  Commonly known as: TYLENOL   650 mg, Oral, Every 6 Hours PRN      amLODIPine 5 MG tablet  Commonly known as: NORVASC   5 mg, Oral, Daily      aspirin  MG tablet   325 mg, Oral, Daily, Will hold 10/1 until surgery      clonazePAM 1 MG tablet  Commonly known as: KlonoPIN   1 mg, Oral, 2 Times Daily      escitalopram 20 MG tablet  Commonly known as: LEXAPRO   20 mg, Oral, Daily      ferrous sulfate 325 (65 FE) MG tablet   325 mg, Oral, Daily With Breakfast      hydroCHLOROthiazide 12.5 MG capsule  Commonly known as: MICROZIDE   12.5 mg, Oral, Daily       levothyroxine 137 MCG tablet  Commonly known as: SYNTHROID, LEVOTHROID   137 mcg, Oral, Daily      loperamide 2 MG capsule  Commonly known as: IMODIUM   2 mg, Oral, 4 Times Daily PRN      melatonin 5 MG tablet tablet   5 mg, Oral, Nightly PRN      ondansetron 4 MG tablet  Commonly known as: ZOFRAN   4 mg, Oral, Every 4 Hours PRN      paliperidone 6 MG 24 hr tablet  Commonly known as: INVEGA   12 mg, Oral, Daily      pseudoephedrine 120 MG 12 hr tablet  Commonly known as: SUDAFED   120 mg, Oral, Every 12 Hours PRN      tamsulosin 0.4 MG capsule 24 hr capsule  Commonly known as: FLOMAX   1 capsule, Oral, Nightly      Zinc Oxide 25 % paste   1 application, Topical, 2 Times Daily         Stop These Medications    bisacodyl 5 MG EC tablet  Commonly known as: DULCOLAX     ciprofloxacin 500 MG tablet  Commonly known as: CIPRO     docusate sodium 100 MG capsule  Commonly known as: COLACE     HYDROcodone-acetaminophen 5-325 MG per tablet  Commonly known as: NORCO            No Known Allergies    Discharge Disposition:  Skilled Nursing Facility (DC - External)      Discharge Diet:  Diet Order   Procedures   • Diet Regular; Cardiac       Discharge Activity:   Activity Instructions     Activity as Tolerated            CODE STATUS:    Code Status and Medical Interventions:   Ordered at: 11/01/22 1816     Code Status (Patient has no pulse and is not breathing):    CPR (Attempt to Resuscitate)     Medical Interventions (Patient has pulse or is breathing):    Full       Future Appointments   Date Time Provider Department Center   12/13/2022  1:50 PM Mk Fischer MD [facility]      Contact information for follow-up providers     Ran Bragg MD Follow up in 1 day(s).    Specialty: Internal Medicine  Contact information:  [address] [place] [address] [place] [address] [phone]             Juan R Figueroa II, MD Follow up.    Specialty: Orthopedic Surgery  Why: Follow up as instructed  Contact  information:  [address]  [address]  [facility] [address]  [phone]             Mk Fischer MD. Schedule an appointment as soon as possible for a visit.    Specialty: Infectious Diseases  Why: Follow up as instructed  Contact information:  [address]  [address]  [facility] [id number] [phone]                   Contact information for after-discharge care     Destination     [facility] .    Service: Skilled Nursing  [facility] information[facility]  [address] [facility] [facility] [address] [phone]                             Time Spent on Discharge:  Greater than 30 minutes      JULIO Hicks  [facility]  11/09/22  11:52 EST

## 2022-11-10 NOTE — CASE MANAGEMENT/SOCIAL WORK
Case Management Discharge Note      Final Note: Patient returned to [facility] for LTC    Provided Post Acute Provider List?: N/A  N/A Provider List Comment: Plans to return to [facility]  Provided Post Acute Provider Quality & Resource List?: N/A  N/A Quality & Resource List Comment: Plans to return to [facility].    Selected Continued Care - Discharged on 11/9/2022 Admission date: 11/1/2022 - Discharge disposition: Skilled Nursing Facility (DC - External)    Destination Coordination complete.    Service Provider Selected Services Address Phone Fax Patient Preferred    [facility] [place] [facility] [address] [facility] [place] [address] [phone] --          Durable Medical Equipment    No services have been selected for the patient.              Dialysis/Infusion    No services have been selected for the patient.              Home Medical Care    No services have been selected for the patient.              Therapy    No services have been selected for the patient.              Community Resources    No services have been selected for the patient.              Community & DME    No services have been selected for the patient.                Selected Continued Care - Prior Encounters Includes continued care and service providers with selected services from prior encounters from 8/3/2022 to 11/9/2022    Discharged on 8/23/2022 Admission date: 8/12/2022 - Discharge disposition: Skilled Nursing Facility (DC - External)    Destination     Service Provider Selected Services Address Phone Fax Patient Preferred    [facility] [place] [facility] [address] [facility] [place] [address] [phone] --                         Final Discharge Disposition Code: 03 - skilled nursing facility (SNF)

## 2022-12-13 ENCOUNTER — OFFICE VISIT (OUTPATIENT)
Dept: INFECTIOUS DISEASES | Facility: CLINIC | Age: 57
End: 2022-12-13

## 2022-12-13 VITALS — DIASTOLIC BLOOD PRESSURE: 77 MMHG | SYSTOLIC BLOOD PRESSURE: 139 MMHG | TEMPERATURE: 97.1 F | HEART RATE: 101 BPM

## 2022-12-13 DIAGNOSIS — A04.72 C. DIFFICILE DIARRHEA: ICD-10-CM

## 2022-12-13 DIAGNOSIS — Z79.2 LONG TERM (CURRENT) USE OF ANTIBIOTICS: ICD-10-CM

## 2022-12-13 DIAGNOSIS — M00.9 INFECTION OF LEFT KNEE: ICD-10-CM

## 2022-12-13 DIAGNOSIS — A49.01 MSSA (METHICILLIN SUSCEPTIBLE STAPHYLOCOCCUS AUREUS) INFECTION: Primary | ICD-10-CM

## 2022-12-13 PROCEDURE — 99214 OFFICE O/P EST MOD 30 MIN: CPT | Performed by: INTERNAL MEDICINE

## 2022-12-13 NOTE — PROGRESS NOTES
ID CLINIC NOTE    CC: follow-up MSSA L knee infection and C diff      HPI: Lucien Morrison is a 57 y.o. male here for follow-up MSSA L knee infection and C diff. He has a history of left patella fracture requiring surgical repair and developed a wound complication. He required surgical revision and had an external fixator placed in August 2022. The external fixator was removed in early October 2022. A few weeks later he began to have drainage from the knee which improved with an oral antibiotic.  However, his symptoms returned and he was admitted to the hospital. I saw him during that stay from 11/2-11/8/22. He had a wound culture done that grew MSSA. He was seen by his orthopedic surgeon. It was determined he did not require surgical intervention at that time. I recommended a 6 week course of cefazolin 2 g IV q8h which he finishes today. He denies knee pain. There is no erythema, heat, or drainage. He denies any side effects from the cefazolin. No issues w/ his PICC line. His caregiver helps provide the history.    During the hospital stay, he developed C diff and was treated with oral vancomycin x 10 days. I then placed him on twice daily vancomycin for prophylaxis while he was finishing out his cefazolin course. He is not sure for how long he took the oral vancomycin but says he is not on it currently (paperwork and phone called confirmed). Thankfully he is not having any diarrhea.      Review of Systems: no n/v/CP/SOA    Past Medical History:   Diagnosis Date   • Anemia    • Anxiety    • Cataract     bilateral   • Closed displaced transverse fracture of left patella 08/12/2022   • Colitis    • Depression    • Disease of thyroid gland    • Diverticulitis    • Hard to intubate    • Hemorrhoids    • History of BPH    • Hydrocele in adult    • Hyperopia    • Hypertension    • Impulse control disorder    • Mental deficiency    • Seasonal allergies    • Shingles        Past Surgical History:   Procedure Laterality Date    • BACK SURGERY      nodule removed   • HARDWARE REMOVAL Left 10/3/2022    Procedure: REMOVAL LEFT LEG EXTERNAL  FIXATOR;  Surgeon: Juan R Figueroa II, MD;  Location: [facility];  Service: Orthopedics;  Laterality: Left;   • ORCHIECTOMY Left    • ORIF HIP FRACTURE Right    • PATELLA OPEN REDUCTION INTERNAL FIXATION Left 8/15/2022    Procedure: PATELLA OPEN REDUCTION INTERNAL FIXATION REVISION AND TENDON REPAIR with EXTERNAL FIXATOR APPLICATOR;  Surgeon: Juan R Figueroa II, MD;  Location: [facility];  Service: Orthopedics;  Laterality: Left;     Social History:  Non-smoker  No illicits  Lives in [place], [place]     Family History:  No 1st degree relatives w/ serious MSSA infections     Antibiotic allergies and intolerances:  None    Medications:     Current Outpatient Medications:   •  ceFAZolin in dextrose (ANCEF) 2-4 GM/100ML-% solution IVPB, Infuse 100 mL into a venous catheter Every 8 (Eight) Hours for 104 doses. Indications: Bone and/or Joint Infection, Disp: 9000 mL, Rfl: 1  •  acetaminophen (TYLENOL) 325 MG tablet, Take 2 tablets by mouth Every 6 (Six) Hours As Needed for Mild Pain., Disp: , Rfl:   •  amLODIPine (NORVASC) 5 MG tablet, Take 5 mg by mouth Daily., Disp: , Rfl:   •  aspirin  MG tablet, Take 325 mg by mouth Daily. Will hold 10/1 until surgery, Disp: , Rfl:   •  clonazePAM (KlonoPIN) 1 MG tablet, Take 1 mg by mouth 2 (Two) Times a Day., Disp: , Rfl:   •  escitalopram (LEXAPRO) 20 MG tablet, Take 20 mg by mouth Daily., Disp: , Rfl:   •  ferrous sulfate 325 (65 FE) MG tablet, Take 1 tablet by mouth Daily With Breakfast., Disp: 30 tablet, Rfl: 0  •  hydroCHLOROthiazide (MICROZIDE) 12.5 MG capsule, Take 12.5 mg by mouth Daily., Disp: , Rfl:   •  levothyroxine (SYNTHROID, LEVOTHROID) 137 MCG tablet, Take 1 tablet by mouth Daily., Disp: , Rfl:   •  loperamide (IMODIUM) 2 MG capsule, Take 2 mg by mouth 4 (Four) Times a Day As Needed for  Diarrhea., Disp: , Rfl:   •  melatonin 5 MG tablet tablet, Take 5 mg by mouth At Night As Needed., Disp: , Rfl:   •  ondansetron (ZOFRAN) 4 MG tablet, Take 4 mg by mouth Every 4 (Four) Hours As Needed for Nausea or Vomiting., Disp: , Rfl:   •  paliperidone (INVEGA) 6 MG 24 hr tablet, Take 12 mg by mouth Daily., Disp: , Rfl:   •  pseudoephedrine (SUDAFED) 120 MG 12 hr tablet, Take 120 mg by mouth Every 12 (Twelve) Hours As Needed for Congestion., Disp: , Rfl:   •  tamsulosin (FLOMAX) 0.4 MG capsule 24 hr capsule, Take 1 capsule by mouth Every Night., Disp: , Rfl:   •  vancomycin (VANCOCIN) 125 MG capsule, Take 1 capsule by mouth Every 12 (Twelve) Hours for 62 doses. Indications: Colon Inflammation due to Clostridium Bacteria Overgrowth, prophylaxis, Disp: 60 capsule, Rfl: 1  •  Zinc Oxide 25 % paste, Apply 1 application topically to the appropriate area as directed 2 (Two) Times a Day., Disp: , Rfl:       OBJECTIVE:  /77 (BP Location: Right arm, Patient Position: Sitting, Cuff Size: Adult)   Pulse 101   Temp 97.1 °F (36.2 °C)     General: NAD, awake, alert, in wheelchair, answers simple questions  Eyes: no scleral icterus  ENT: MMM, no thrush  Cardiovascular: NR  Respiratory: normal work of breathing on room air; no wheezing  GI: Abdomen is soft, not distended  :  no Hester catheter  Musculoskeletal: L knee incision is healed; one small scab; no drainage  Skin: No rashes  Psychiatric: calm and pleasant  Vasc: LUE PICC w/o erythema    DIAGNOSTICS:   Labs:  WBC 9  Crt  0.48  CRP 6.2 mg/dL    Lab Results   Component Value Date    HGBA1C 6.00 (H) 11/02/2022     Microbiology:  10/28 Left Knee Wound Cx: moderate growth MSSA (susc all abx tested)  11/1 BCx: coag-negative Staph in 1/2 sets = contaminant  11/1 Left Knee Wound Cx: MSSA  11/8 C diff PCR and Ag positive    ASSESSMENT/PLAN:  1. MSSA infection   2. Long-tern use of antibiotics - new problem  3. Intellectual disability  4. C diff diarrhea      He has now  completed a 6-week course of cefazolin with a good clinical result. I will DC PICC line and cefazolin today. With his caregiver, I discussed signs and symptoms of recurrent infection. I asked him to please call us and Dr Figueroa's office if those occur. He expressed understanding. RTC as needed.
